# Patient Record
Sex: MALE | Race: WHITE | NOT HISPANIC OR LATINO | Employment: OTHER | URBAN - METROPOLITAN AREA
[De-identification: names, ages, dates, MRNs, and addresses within clinical notes are randomized per-mention and may not be internally consistent; named-entity substitution may affect disease eponyms.]

---

## 2017-02-09 DIAGNOSIS — E78.5 HYPERLIPIDEMIA: ICD-10-CM

## 2017-02-10 RX ORDER — ATORVASTATIN CALCIUM 40 MG/1
TABLET, FILM COATED ORAL
Qty: 90 TABLET | Refills: 0 | Status: SHIPPED | OUTPATIENT
Start: 2017-02-10 | End: 2017-02-13

## 2017-02-13 DIAGNOSIS — E78.5 HYPERLIPIDEMIA: ICD-10-CM

## 2017-02-13 RX ORDER — ATORVASTATIN CALCIUM 40 MG/1
TABLET, FILM COATED ORAL
Qty: 90 TABLET | Refills: 0 | Status: SHIPPED | OUTPATIENT
Start: 2017-02-13 | End: 2017-05-15 | Stop reason: SDUPTHER

## 2017-04-20 DIAGNOSIS — G43.909 MIGRAINE WITHOUT STATUS MIGRAINOSUS, NOT INTRACTABLE, UNSPECIFIED MIGRAINE TYPE: ICD-10-CM

## 2017-04-20 RX ORDER — CEFUROXIME AXETIL 500 MG/1
500 TABLET ORAL 2 TIMES DAILY
Refills: 1 | COMMUNITY
Start: 2017-04-11 | End: 2017-05-03 | Stop reason: ALTCHOICE

## 2017-04-20 RX ORDER — MELOXICAM 15 MG/1
15 TABLET ORAL DAILY
Refills: 1 | COMMUNITY
Start: 2017-02-09 | End: 2017-10-13 | Stop reason: SDUPTHER

## 2017-04-20 NOTE — LETTER
April 25, 2017    Tommy Keith  [address] [place] [address]             [facility]  [address] [facility] [place] [address]  Phone: [phone] Dear Mr. Keith:    This letter is a reminder that your follow up appointment with Dr. Jain is overdue.  Please call our office to schedule an appointment.    If you've already scheduled this appointment, please disregard this notice.    Sincerely,        Barbara Martin LPN

## 2017-04-21 RX ORDER — CEFUROXIME AXETIL 250 MG/1
TABLET ORAL
Qty: 1 ML | Refills: 1 | Status: SHIPPED | OUTPATIENT
Start: 2017-04-21 | End: 2017-05-03 | Stop reason: SDUPTHER

## 2017-04-21 NOTE — TELEPHONE ENCOUNTER
Left message to voicemail [phone] to return call to clinic re: due for follow up appointment with Dr. Jain

## 2017-04-25 NOTE — TELEPHONE ENCOUNTER
Left message to voicemail [phone] to return call to clinic re: due for follow up appointment with Dr. Jain    Letter mailed to patient re: due for follow up appointment with Dr. Jain

## 2017-05-03 ENCOUNTER — OFFICE VISIT (OUTPATIENT)
Dept: FAMILY MEDICINE | Facility: CLINIC | Age: 67
End: 2017-05-03
Payer: COMMERCIAL

## 2017-05-03 VITALS
OXYGEN SATURATION: 98 % | DIASTOLIC BLOOD PRESSURE: 78 MMHG | TEMPERATURE: 98 F | BODY MASS INDEX: 32.01 KG/M2 | HEART RATE: 78 BPM | HEIGHT: 67 IN | SYSTOLIC BLOOD PRESSURE: 100 MMHG | WEIGHT: 203.94 LBS | RESPIRATION RATE: 17 BRPM

## 2017-05-03 DIAGNOSIS — Z12.11 COLON CANCER SCREENING: ICD-10-CM

## 2017-05-03 DIAGNOSIS — G43.909 MIGRAINE WITHOUT STATUS MIGRAINOSUS, NOT INTRACTABLE, UNSPECIFIED MIGRAINE TYPE: ICD-10-CM

## 2017-05-03 DIAGNOSIS — E78.2 MIXED HYPERLIPIDEMIA: Primary | ICD-10-CM

## 2017-05-03 DIAGNOSIS — E78.5 HYPERLIPIDEMIA, UNSPECIFIED HYPERLIPIDEMIA TYPE: ICD-10-CM

## 2017-05-03 PROCEDURE — 99214 OFFICE O/P EST MOD 30 MIN: CPT | Mod: S$GLB,,, | Performed by: INTERNAL MEDICINE

## 2017-05-03 PROCEDURE — 3074F SYST BP LT 130 MM HG: CPT | Mod: S$GLB,,, | Performed by: INTERNAL MEDICINE

## 2017-05-03 PROCEDURE — 1160F RVW MEDS BY RX/DR IN RCRD: CPT | Mod: S$GLB,,, | Performed by: INTERNAL MEDICINE

## 2017-05-03 PROCEDURE — 99999 PR PBB SHADOW E&M-EST. PATIENT-LVL III: CPT | Mod: PBBFAC,,, | Performed by: INTERNAL MEDICINE

## 2017-05-03 PROCEDURE — 3078F DIAST BP <80 MM HG: CPT | Mod: S$GLB,,, | Performed by: INTERNAL MEDICINE

## 2017-05-03 RX ORDER — CEFUROXIME AXETIL 250 MG/1
TABLET ORAL
Qty: 1 ML | Refills: 1 | Status: SHIPPED | OUTPATIENT
Start: 2017-05-21 | End: 2018-01-05 | Stop reason: SDUPTHER

## 2017-05-03 NOTE — PROGRESS NOTES
Subjective:       Patient ID: Tommy Keith is a 66 y.o. male.    Chief Complaint: Hyperlipidemia; Follow-up; Headache; and Medication Refill    HPI Comments: He presents for follow up.  He complains of recurrent migraines over the past few months.  He had not had consistent headaches for over 2 year.  He is still working on his diet and exercise, attempting to lose weight.     Review of Systems   Neurological: Positive for headaches.       Objective:      Physical Exam   Constitutional: He is oriented to person, place, and time. He appears well-developed and well-nourished. No distress.   HENT:   Head: Normocephalic and atraumatic.   Right Ear: External ear normal.   Left Ear: External ear normal.   Mouth/Throat: Oropharynx is clear and moist. No oropharyngeal exudate.   Eyes: EOM are normal. Pupils are equal, round, and reactive to light.   Neurological: He is alert and oriented to person, place, and time.   Skin: Skin is warm and dry. No rash noted.   Psychiatric: He has a normal mood and affect.   Vitals reviewed.      Assessment:       1. Mixed hyperlipidemia    2. Migraine without status migrainosus, not intractable, unspecified migraine type    3. Hyperlipidemia, unspecified hyperlipidemia type    4. Colon cancer screening        Plan:       Tommy was seen today for hyperlipidemia, follow-up, headache and medication refill.    Diagnoses and all orders for this visit:    Mixed hyperlipidemia - Continue Atorvastatin, exercise and low cholesterol diet    Migraine without status migrainosus, not intractable, unspecified migraine type - recurrent over the past 2-3 months.  Will montior  -     sumatriptan (IMITREX STATDOSE) 6 mg/0.5 mL kit; USE AS DIRECTED MAY REPEAT IN 1 HR MAX 2 DOSES PER 24 HOURS    Hyperlipidemia, unspecified hyperlipidemia type  -     Lipid panel; Future  -     Comprehensive metabolic panel; Future    Colon cancer screening  -     Case request GI: COLONOSCOPY       f/u 6 months and  prn

## 2017-05-08 ENCOUNTER — LAB VISIT (OUTPATIENT)
Dept: LAB | Facility: HOSPITAL | Age: 67
End: 2017-05-08
Attending: INTERNAL MEDICINE
Payer: COMMERCIAL

## 2017-05-08 DIAGNOSIS — E78.5 HYPERLIPIDEMIA, UNSPECIFIED HYPERLIPIDEMIA TYPE: ICD-10-CM

## 2017-05-08 LAB
ALBUMIN SERPL BCP-MCNC: 3.8 G/DL
ALP SERPL-CCNC: 58 U/L
ALT SERPL W/O P-5'-P-CCNC: 33 U/L
ANION GAP SERPL CALC-SCNC: 5 MMOL/L
AST SERPL-CCNC: 29 U/L
BILIRUB SERPL-MCNC: 0.9 MG/DL
BUN SERPL-MCNC: 19 MG/DL
CALCIUM SERPL-MCNC: 9.7 MG/DL
CHLORIDE SERPL-SCNC: 106 MMOL/L
CHOLEST/HDLC SERPL: 5.8 {RATIO}
CO2 SERPL-SCNC: 26 MMOL/L
CREAT SERPL-MCNC: 1 MG/DL
EST. GFR  (AFRICAN AMERICAN): >60 ML/MIN/1.73 M^2
EST. GFR  (NON AFRICAN AMERICAN): >60 ML/MIN/1.73 M^2
GLUCOSE SERPL-MCNC: 105 MG/DL
HDL/CHOLESTEROL RATIO: 17.1 %
HDLC SERPL-MCNC: 193 MG/DL
HDLC SERPL-MCNC: 33 MG/DL
LDLC SERPL CALC-MCNC: 143.2 MG/DL
NONHDLC SERPL-MCNC: 160 MG/DL
POTASSIUM SERPL-SCNC: 4.6 MMOL/L
PROT SERPL-MCNC: 7.1 G/DL
SODIUM SERPL-SCNC: 137 MMOL/L
TRIGL SERPL-MCNC: 84 MG/DL

## 2017-05-08 PROCEDURE — 80053 COMPREHEN METABOLIC PANEL: CPT

## 2017-05-08 PROCEDURE — 36415 COLL VENOUS BLD VENIPUNCTURE: CPT | Mod: PO

## 2017-05-08 PROCEDURE — 80061 LIPID PANEL: CPT

## 2017-05-13 DIAGNOSIS — E78.5 HYPERLIPIDEMIA, UNSPECIFIED HYPERLIPIDEMIA TYPE: ICD-10-CM

## 2017-05-15 RX ORDER — ATORVASTATIN CALCIUM 40 MG/1
40 TABLET, FILM COATED ORAL DAILY
Qty: 90 TABLET | Refills: 0 | Status: SHIPPED | OUTPATIENT
Start: 2017-05-15 | End: 2017-05-26 | Stop reason: SDUPTHER

## 2017-05-24 ENCOUNTER — TELEPHONE (OUTPATIENT)
Dept: FAMILY MEDICINE | Facility: CLINIC | Age: 67
End: 2017-05-24

## 2017-05-24 NOTE — TELEPHONE ENCOUNTER
----- Message from Lawanda Ingram sent at 5/24/2017  2:26 PM CDT -----  Contact: Self/[phone]  Patient is requesting an order to schedule a Colonoscopy. Thank you.

## 2017-05-24 NOTE — TELEPHONE ENCOUNTER
Left message to voicemail [phone] to return call to clinic re: COLONOSCOPY orders entered 5-3-17

## 2017-05-26 DIAGNOSIS — E78.5 HYPERLIPIDEMIA, UNSPECIFIED HYPERLIPIDEMIA TYPE: ICD-10-CM

## 2017-05-26 RX ORDER — ATORVASTATIN CALCIUM 40 MG/1
40 TABLET, FILM COATED ORAL DAILY
Qty: 90 TABLET | Refills: 1 | Status: SHIPPED | OUTPATIENT
Start: 2017-05-26 | End: 2017-11-26 | Stop reason: SDUPTHER

## 2017-06-01 NOTE — TELEPHONE ENCOUNTER
----- Message from Cass Nair sent at 6/1/2017  1:20 PM CDT -----  Contact: Self  Pt returning call [phone]

## 2017-10-13 ENCOUNTER — OFFICE VISIT (OUTPATIENT)
Dept: FAMILY MEDICINE | Facility: CLINIC | Age: 67
End: 2017-10-13
Payer: COMMERCIAL

## 2017-10-13 VITALS
SYSTOLIC BLOOD PRESSURE: 132 MMHG | DIASTOLIC BLOOD PRESSURE: 80 MMHG | HEART RATE: 81 BPM | TEMPERATURE: 98 F | BODY MASS INDEX: 32.08 KG/M2 | RESPIRATION RATE: 18 BRPM | WEIGHT: 204.38 LBS | HEIGHT: 67 IN | OXYGEN SATURATION: 96 %

## 2017-10-13 DIAGNOSIS — J31.0 CHRONIC RHINITIS, UNSPECIFIED TYPE: ICD-10-CM

## 2017-10-13 DIAGNOSIS — E86.0 DEHYDRATION: Primary | ICD-10-CM

## 2017-10-13 DIAGNOSIS — M50.30 DEGENERATION OF CERVICAL INTERVERTEBRAL DISC: ICD-10-CM

## 2017-10-13 DIAGNOSIS — Z23 NEED FOR PROPHYLACTIC VACCINATION AND INOCULATION AGAINST INFLUENZA: ICD-10-CM

## 2017-10-13 DIAGNOSIS — M79.602 LEFT ARM PAIN: ICD-10-CM

## 2017-10-13 PROCEDURE — 99999 PR PBB SHADOW E&M-EST. PATIENT-LVL III: CPT | Mod: PBBFAC,,, | Performed by: INTERNAL MEDICINE

## 2017-10-13 PROCEDURE — 99214 OFFICE O/P EST MOD 30 MIN: CPT | Mod: 25,S$GLB,, | Performed by: INTERNAL MEDICINE

## 2017-10-13 PROCEDURE — 90662 IIV NO PRSV INCREASED AG IM: CPT | Mod: S$GLB,,, | Performed by: INTERNAL MEDICINE

## 2017-10-13 PROCEDURE — 90471 IMMUNIZATION ADMIN: CPT | Mod: S$GLB,,, | Performed by: INTERNAL MEDICINE

## 2017-10-13 RX ORDER — FLUTICASONE PROPIONATE 50 MCG
SPRAY, SUSPENSION (ML) NASAL
Qty: 1 BOTTLE | Refills: 2 | Status: SHIPPED | OUTPATIENT
Start: 2017-10-13 | End: 2018-03-01 | Stop reason: SDUPTHER

## 2017-10-13 RX ORDER — MELOXICAM 15 MG/1
15 TABLET ORAL DAILY
Qty: 30 TABLET | Refills: 1 | Status: SHIPPED | OUTPATIENT
Start: 2017-10-13 | End: 2017-12-21 | Stop reason: SDUPTHER

## 2017-10-13 NOTE — PROGRESS NOTES
Subjective:       Patient ID: Tommy Keith is a 67 y.o. male.    Chief Complaint: Dehydration; Fatigue; Generalized Body Aches; ER follow up ([facility] - 10/11/17 ); and Flu Vaccine    He presents for follow-up after a recent ER evaluation for dehydration.  He reports that he became overheated at work 2 days ago.  He generally works outside and does a lot of climbing back and forth during the day.  He was doing his usual work when he felt faint like he would pass out.  He was seen by a physician on the job and completed an EKG and sent him to the emergency room because it was normal.  There he had labs and a repeat EKG which was reportedly okay.  He received IV fluids and was discharged.  He feels better overall but is still somewhat tired and achy.  He has been drinking plenty of water.  He returned back to work today.    He also complains of recent moderate pain in his left arm from his shoulder down to his hand.  This occurs randomly.  There is no associated chest pain or other symptoms.  He denies any recent injury.  He does recall a history of neck pain but not currently.      Review of Systems   Constitutional: Positive for fatigue.   Respiratory: Negative for shortness of breath.    Cardiovascular: Negative for chest pain.   Musculoskeletal: Positive for arthralgias and myalgias.       Objective:      Physical Exam   Constitutional: He is oriented to person, place, and time. He appears well-developed and well-nourished. No distress.   HENT:   Head: Normocephalic and atraumatic.   Right Ear: External ear normal.   Left Ear: External ear normal.   Mouth/Throat: Oropharynx is clear and moist. No oropharyngeal exudate.   Eyes: Conjunctivae are normal. No scleral icterus.   Cardiovascular: Normal rate, regular rhythm and normal heart sounds.  Exam reveals no gallop and no friction rub.    No murmur heard.  Pulmonary/Chest: Effort normal and breath sounds normal. No respiratory distress. He has  no wheezes. He has no rales.   Neurological: He is alert and oriented to person, place, and time. No cranial nerve deficit.   Skin: Skin is warm and dry. No rash noted.   Psychiatric: He has a normal mood and affect.   Vitals reviewed.      Assessment:       1. Dehydration    2. Need for prophylactic vaccination and inoculation against influenza    3. Degeneration of cervical intervertebral disc    4. Left arm pain    5. Chronic rhinitis, unspecified type        Plan:       Tommy was seen today for dehydration, fatigue, generalized body aches, er follow up and flu vaccine.    Diagnoses and all orders for this visit:    Dehydration - he presents for follow-up after recent ER visit at [place] for dehydration after overheating at work with the initial abnormal EKG.  Will obtain his records and reviewed his labs and EKG.    Need for prophylactic vaccination and inoculation against influenza  -     Influenza - High Dose (65+) (PF) (IM)    Degeneration of cervical intervertebral disc    Left arm pain - complains of intermittent left arm pain with no associated symptoms.  Suspect cervical radiculopathy.  Also considered anginal pain.  Will review his EKG as above.  Trial of Catarinoitz and the meantime.  Follow-up if persistent.  -     meloxicam (MOBIC) 15 MG tablet; Take 1 tablet (15 mg total) by mouth once daily.    Chronic rhinitis, unspecified type  -     fluticasone (FLONASE) 50 mcg/actuation nasal spray; INSTILL 2 SPRAYS INTO EACH NOSTRIL DAILY AS NEEDED    Follow-up when necessary

## 2017-10-13 NOTE — PROGRESS NOTES
High dose influenza vaccine was given to the pt as order by the MD. The patient tolerated well, I advised the patient to wait 15 minuets to observe for any vaccine reactions. The pt. Expressed an understanding.

## 2017-10-20 ENCOUNTER — TELEPHONE (OUTPATIENT)
Dept: FAMILY MEDICINE | Facility: CLINIC | Age: 67
End: 2017-10-20

## 2017-10-20 NOTE — TELEPHONE ENCOUNTER
He may be requesting labs results form recent [facility] admission.  We were supposed to request his records.  Please request notes, labs, and imaging.

## 2017-10-20 NOTE — TELEPHONE ENCOUNTER
----- Message from Brenda Murcia sent at 10/20/2017  1:15 PM CDT -----  Contact: self  Patient requesting a call back at [phone] once test results are back.    Thanks!

## 2017-11-26 DIAGNOSIS — E78.5 HYPERLIPIDEMIA, UNSPECIFIED HYPERLIPIDEMIA TYPE: ICD-10-CM

## 2017-11-26 RX ORDER — ATORVASTATIN CALCIUM 40 MG/1
40 TABLET, FILM COATED ORAL DAILY
Qty: 90 TABLET | Refills: 1 | Status: SHIPPED | OUTPATIENT
Start: 2017-11-26 | End: 2018-06-06 | Stop reason: SDUPTHER

## 2017-11-29 NOTE — TELEPHONE ENCOUNTER
Notify the patient that I did review his ER records from [place] from last month.  His labs were all normal.  No need to repeat any labs.

## 2017-12-21 DIAGNOSIS — M79.602 LEFT ARM PAIN: ICD-10-CM

## 2017-12-21 DIAGNOSIS — Z12.11 SCREENING FOR COLON CANCER: Primary | ICD-10-CM

## 2017-12-21 RX ORDER — MELOXICAM 15 MG/1
15 TABLET ORAL DAILY
Qty: 30 TABLET | Refills: 1 | Status: SHIPPED | OUTPATIENT
Start: 2017-12-21 | End: 2018-02-15 | Stop reason: SDUPTHER

## 2017-12-21 NOTE — TELEPHONE ENCOUNTER
----- Message from Gertrude Noyola MA sent at 12/21/2017 11:43 AM CST -----  Contact: [phone]      ----- Message -----  From: Vane Diaz  Sent: 12/21/2017  11:26 AM  To: Ramakrishna ROUSE Staff    Pt is requesting another order for a colonoscopy Please call pt at your earliest convenience.  Thanks !

## 2018-01-05 ENCOUNTER — OFFICE VISIT (OUTPATIENT)
Dept: FAMILY MEDICINE | Facility: CLINIC | Age: 68
End: 2018-01-05
Payer: COMMERCIAL

## 2018-01-05 VITALS
BODY MASS INDEX: 31.73 KG/M2 | HEART RATE: 96 BPM | OXYGEN SATURATION: 97 % | SYSTOLIC BLOOD PRESSURE: 122 MMHG | HEIGHT: 67 IN | WEIGHT: 202.19 LBS | TEMPERATURE: 98 F | DIASTOLIC BLOOD PRESSURE: 82 MMHG

## 2018-01-05 DIAGNOSIS — Z12.11 SCREENING FOR MALIGNANT NEOPLASM OF COLON: Primary | ICD-10-CM

## 2018-01-05 DIAGNOSIS — E78.2 MIXED HYPERLIPIDEMIA: ICD-10-CM

## 2018-01-05 DIAGNOSIS — G43.909 MIGRAINE WITHOUT STATUS MIGRAINOSUS, NOT INTRACTABLE, UNSPECIFIED MIGRAINE TYPE: ICD-10-CM

## 2018-01-05 DIAGNOSIS — Z23 NEED FOR PROPHYLACTIC VACCINATION AGAINST STREPTOCOCCUS PNEUMONIAE (PNEUMOCOCCUS): ICD-10-CM

## 2018-01-05 DIAGNOSIS — Z87.891 HISTORY OF SMOKING: ICD-10-CM

## 2018-01-05 PROCEDURE — 90670 PCV13 VACCINE IM: CPT | Mod: S$GLB,,, | Performed by: FAMILY MEDICINE

## 2018-01-05 PROCEDURE — 90471 IMMUNIZATION ADMIN: CPT | Mod: S$GLB,,, | Performed by: FAMILY MEDICINE

## 2018-01-05 PROCEDURE — 99214 OFFICE O/P EST MOD 30 MIN: CPT | Mod: 25,S$GLB,, | Performed by: FAMILY MEDICINE

## 2018-01-05 PROCEDURE — 99999 PR PBB SHADOW E&M-EST. PATIENT-LVL IV: CPT | Mod: PBBFAC,,, | Performed by: FAMILY MEDICINE

## 2018-01-05 RX ORDER — CEFUROXIME AXETIL 250 MG/1
TABLET ORAL
Qty: 12 ML | Refills: 1 | Status: SHIPPED | OUTPATIENT
Start: 2018-01-05 | End: 2019-06-30 | Stop reason: SDUPTHER

## 2018-01-05 NOTE — PATIENT INSTRUCTIONS

## 2018-01-05 NOTE — PROGRESS NOTES
Routine Office Visit    Patient Name: Tommy Keith    : 1950  MRN: [id number]    Subjective:  Tommy is a 67 y.o. male who presents today for     1. Establish care / new to me / previously seen by Dr. Jain. No issues/complaints. Doing well with current medication regimen.   2. Colonoscopy - pt needs orders for colonoscopy.   3. Migraine - needs imitrex refill. He states migraines are much less frequent that history. He has had 2 episodes in last few months.     Review of Systems   Constitutional: Negative for chills and fever.   HENT: Negative for congestion.    Eyes: Negative for blurred vision.   Respiratory: Negative for cough.    Cardiovascular: Negative for chest pain.   Gastrointestinal: Negative for abdominal pain, constipation, diarrhea, heartburn, nausea and vomiting.   Genitourinary: Negative for dysuria.   Musculoskeletal: Negative for myalgias.   Skin: Negative for itching and rash.   Neurological: Negative for dizziness and headaches.   Psychiatric/Behavioral: Negative for depression.       Active Problem List  Patient Active Problem List   Diagnosis    Sciatica of left side    Hyperlipidemia    History of BPH    History of hepatitis C    Low serum testosterone level    Cervical spondylosis without myelopathy    Degeneration of cervical intervertebral disc    Acquired spondylolisthesis    Displacement of lumbar intervertebral disc without myelopathy    Degeneration of lumbar or lumbosacral intervertebral disc    Thoracic or lumbosacral neuritis or radiculitis, unspecified    Chest pain    Atherosclerosis of aorta       Past Surgical History  Past Surgical History:   Procedure Laterality Date    arthroscopy of both knees      left inguinal hernia repair      LIVER BIOPSY      tonsillectomy      TONSILLECTOMY      UMBILICAL HERNIA REPAIR      VENTRAL HERNIA REPAIR         Family History  Family History   Problem Relation Age of Onset    Heart disease Father     Clotting  "disorder Brother        Social History  Social History     Social History    Marital status:      Spouse name: Deloris    Number of children: 1    Years of education: N/A     Occupational History    works at a chemical plant      Social History Main Topics    Smoking status: Former Smoker     Quit date: 8/16/2006    Smokeless tobacco: Never Used    Alcohol use No    Drug use: No    Sexual activity: Yes     Partners: Female     Other Topics Concern    Not on file     Social History Narrative    He is walking regularly.       Medications and Allergies  Reviewed and updated.   Current Outpatient Prescriptions   Medication Sig    albuterol 90 mcg/actuation inhaler Inhale 2 puffs into the lungs every 6 (six) hours as needed.    aspirin (ECOTRIN) 81 MG EC tablet Take 81 mg by mouth once daily.    atorvastatin (LIPITOR) 40 MG tablet TAKE 1 TABLET (40 MG TOTAL) BY MOUTH ONCE DAILY.    fluticasone (FLONASE) 50 mcg/actuation nasal spray INSTILL 2 SPRAYS INTO EACH NOSTRIL DAILY AS NEEDED    FOLIC ACID/MULTIVIT-MIN/LUTEIN (CENTRUM SILVER ORAL) Take by mouth.    meloxicam (MOBIC) 15 MG tablet Take 1 tablet (15 mg total) by mouth once daily.    sumatriptan (IMITREX STATDOSE) 6 mg/0.5 mL kit USE AS DIRECTED MAY REPEAT IN 1 HR MAX 2 DOSES PER 24 HOURS    testosterone cypionate (DEPOTESTOTERONE CYPIONATE) 200 mg/mL injection Inject 200 mg into the muscle every 14 (fourteen) days.     No current facility-administered medications for this visit.        Physical Exam  /82 (BP Location: Right arm, Patient Position: Sitting, BP Method: Large (Manual))   Pulse 96   Temp 97.9 °F (36.6 °C) (Oral)   Ht 5' 7" (1.702 m)   Wt 91.7 kg (202 lb 2.6 oz)   SpO2 97%   BMI 31.66 kg/m²   Physical Exam   Constitutional: He is oriented to person, place, and time. He appears well-developed and well-nourished.   HENT:   Head: Normocephalic and atraumatic.   Eyes: Conjunctivae and EOM are normal. Pupils are equal, " round, and reactive to light.   Neck: Normal range of motion. Neck supple. No JVD present. No thyromegaly present.   Cardiovascular: Normal rate, regular rhythm and normal heart sounds.    Pulmonary/Chest: Effort normal and breath sounds normal. He has no wheezes.   Abdominal: Soft. Bowel sounds are normal. He exhibits no distension. There is no tenderness. There is no guarding.   Musculoskeletal: Normal range of motion.   Lymphadenopathy:     He has no cervical adenopathy.   Neurological: He is alert and oriented to person, place, and time.   Skin: Skin is warm and dry.   Psychiatric: He has a normal mood and affect. His behavior is normal.         Assessment/Plan:  Tommy Keith is a 67 y.o. male who presents today for :    Screening for malignant neoplasm of colon  -     Case request GI: COLONOSCOPY    Migraine without status migrainosus, not intractable, unspecified migraine type  -     sumatriptan (IMITREX STATDOSE) 6 mg/0.5 mL kit; USE AS DIRECTED MAY REPEAT IN 1 HR MAX 2 DOSES PER 24 HOURS  Dispense: 12 mL; Refill: 1  The current medical regimen is effective;  continue present plan and medications.    Need for prophylactic vaccination against Streptococcus pneumoniae (pneumococcus)  -     Pneumococcal Conjugate Vaccine (13 Valent) (IM)    History of smoking  -     US Abdominal Aorta; Future; Expected date: 01/05/2018    Mixed hyperlipidemia  The current medical regimen is effective;  continue present plan and medications.        Return in about 6 months (around 7/5/2018).

## 2018-01-16 ENCOUNTER — HOSPITAL ENCOUNTER (OUTPATIENT)
Dept: RADIOLOGY | Facility: HOSPITAL | Age: 68
Discharge: HOME | End: 2018-01-16
Attending: FAMILY MEDICINE
Payer: COMMERCIAL

## 2018-01-16 DIAGNOSIS — Z87.891 HISTORY OF SMOKING: ICD-10-CM

## 2018-01-16 PROCEDURE — 76775 US EXAM ABDO BACK WALL LIM: CPT | Mod: 26,,, | Performed by: RADIOLOGY

## 2018-01-16 PROCEDURE — 76775 US EXAM ABDO BACK WALL LIM: CPT | Mod: TC

## 2018-01-22 DIAGNOSIS — Z12.11 COLON CANCER SCREENING: Primary | ICD-10-CM

## 2018-01-25 ENCOUNTER — OFFICE VISIT (OUTPATIENT)
Dept: FAMILY MEDICINE | Facility: CLINIC | Age: 68
End: 2018-01-25
Payer: COMMERCIAL

## 2018-01-25 ENCOUNTER — HOSPITAL ENCOUNTER (OUTPATIENT)
Dept: RADIOLOGY | Facility: HOSPITAL | Age: 68
Discharge: HOME | End: 2018-01-25
Attending: NURSE PRACTITIONER
Payer: COMMERCIAL

## 2018-01-25 VITALS
WEIGHT: 198.88 LBS | OXYGEN SATURATION: 96 % | HEART RATE: 93 BPM | DIASTOLIC BLOOD PRESSURE: 70 MMHG | TEMPERATURE: 98 F | BODY MASS INDEX: 31.21 KG/M2 | HEIGHT: 67 IN | SYSTOLIC BLOOD PRESSURE: 110 MMHG

## 2018-01-25 DIAGNOSIS — R61 NIGHT SWEATS: Primary | ICD-10-CM

## 2018-01-25 DIAGNOSIS — R61 NIGHT SWEATS: ICD-10-CM

## 2018-01-25 PROCEDURE — 71046 X-RAY EXAM CHEST 2 VIEWS: CPT | Mod: 26,,, | Performed by: RADIOLOGY

## 2018-01-25 PROCEDURE — 71046 X-RAY EXAM CHEST 2 VIEWS: CPT | Mod: TC,PO

## 2018-01-25 PROCEDURE — 99999 PR PBB SHADOW E&M-EST. PATIENT-LVL III: CPT | Mod: PBBFAC,,, | Performed by: NURSE PRACTITIONER

## 2018-01-25 PROCEDURE — 99214 OFFICE O/P EST MOD 30 MIN: CPT | Mod: SA,S$GLB,, | Performed by: NURSE PRACTITIONER

## 2018-01-25 NOTE — PROGRESS NOTES
Subjective:       Patient ID: Tommy Keith is a 67 y.o. male.    Chief Complaint: Night Sweats (X 1 week ago ) and Insomnia (X pt states for awhile )    66 yo new to me presents for night sweats. He has no other symptoms. Only new medication is a natural OTC herb that he has been taking. He also reports taking theraflu preventatively as he was around someone else diagnosed with the flu. He has no other complaints at this time.       URI    This is a new (night sweats nightly for the last 2 weeks. Have to change clothes in the middle of the night. ) problem. The current episode started 1 to 4 weeks ago (about 2 weeks ago). The problem has been gradually worsening. There has been no fever. Associated symptoms include rhinorrhea. Pertinent negatives include no chest pain, congestion, coughing, ear pain, nausea, sinus pain, sneezing, sore throat, vomiting or wheezing.       Past Medical History:   Diagnosis Date    Asthma, intermittent     irritant induced    History of BPH     s/p laser TURP    History of hepatitis C     s/p treatment, in remission    History of migraine headaches     History of multiple pulmonary nodules     Hyperlipidemia     Low serum testosterone level     Migraine headache     OA (osteoarthritis)     Obesity     Sciatica of left side     Spinal stenosis        Social History     Social History    Marital status:      Spouse name: Deloris    Number of children: 1    Years of education: N/A     Occupational History    works at a chemical plant      Social History Main Topics    Smoking status: Former Smoker     Quit date: 8/16/2006    Smokeless tobacco: Never Used    Alcohol use No    Drug use: No    Sexual activity: Yes     Partners: Female     Other Topics Concern    Not on file     Social History Narrative    He is walking regularly.       Past Surgical History:   Procedure Laterality Date    arthroscopy of both knees      left inguinal hernia repair       "LIVER BIOPSY      tonsillectomy      TONSILLECTOMY      UMBILICAL HERNIA REPAIR      VENTRAL HERNIA REPAIR         Review of Systems   Constitutional: Negative for chills and fever.   HENT: Positive for rhinorrhea. Negative for congestion, ear pain, sinus pain, sinus pressure, sneezing and sore throat.    Respiratory: Negative for cough, shortness of breath and wheezing.    Cardiovascular: Negative for chest pain, palpitations and leg swelling.   Gastrointestinal: Negative for nausea and vomiting.   Endocrine: Negative for polydipsia, polyphagia and polyuria.   All other systems reviewed and are negative.      Objective:   /70 (BP Location: Left arm, Patient Position: Sitting, BP Method: Large (Manual))   Pulse 93   Temp 98.2 °F (36.8 °C) (Oral)   Ht 5' 7" (1.702 m)   Wt 90.2 kg (198 lb 13.7 oz)   SpO2 96%   BMI 31.15 kg/m²      Physical Exam   Constitutional: He is oriented to person, place, and time. He appears well-developed and well-nourished.   HENT:   Head: Normocephalic and atraumatic.   Right Ear: Hearing, tympanic membrane, external ear and ear canal normal.   Left Ear: Hearing, tympanic membrane, external ear and ear canal normal.   Nose: No rhinorrhea.   Mouth/Throat: No oropharyngeal exudate, posterior oropharyngeal edema or posterior oropharyngeal erythema.   Eyes: Conjunctivae, EOM and lids are normal. Pupils are equal, round, and reactive to light.   Neck: Normal carotid pulses and no JVD present. Carotid bruit is not present. No thyroid mass and no thyromegaly present.   Cardiovascular: Normal rate, regular rhythm, normal heart sounds and normal pulses.    Pulmonary/Chest: Effort normal and breath sounds normal. No respiratory distress. He has no decreased breath sounds. He has no wheezes. He has no rhonchi. He has no rales.   Neurological: He is alert and oriented to person, place, and time.   Skin: Skin is warm, dry and intact. He is not diaphoretic. No pallor.   Psychiatric: He has " a normal mood and affect. His speech is normal and behavior is normal.       Assessment:       1. Night sweats        Plan:       Tommy was seen today for night sweats and insomnia.    Diagnoses and all orders for this visit:    Night sweats  -     X-Ray Chest PA And Lateral; Future  -     Comprehensive metabolic panel; Future  -     CBC auto differential; Future  -     TSH; Future  -     T4, free; Future  -     Hemoglobin A1c; Future    Encouraged to stop natural herb  Will follow up when results available  Follow-up if symptoms worsen or fail to improve.

## 2018-01-26 ENCOUNTER — TELEPHONE (OUTPATIENT)
Dept: FAMILY MEDICINE | Facility: CLINIC | Age: 68
End: 2018-01-26

## 2018-01-26 NOTE — TELEPHONE ENCOUNTER
----- Message from MARIUSZ Shelton sent at 1/26/2018  8:10 AM CST -----  Please inform patient all his labs are normal and his chest xray. Will discuss this with his PCP to see next course of action.

## 2018-01-26 NOTE — TELEPHONE ENCOUNTER
Patient notified.   Patient states he forgot to mention his side hurt when he went for ultrasound.

## 2018-01-29 ENCOUNTER — HOSPITAL ENCOUNTER (EMERGENCY)
Facility: OTHER | Age: 68
End: 2018-01-29
Attending: EMERGENCY MEDICINE
Payer: COMMERCIAL

## 2018-01-29 VITALS
DIASTOLIC BLOOD PRESSURE: 64 MMHG | SYSTOLIC BLOOD PRESSURE: 109 MMHG | TEMPERATURE: 98 F | BODY MASS INDEX: 30.61 KG/M2 | OXYGEN SATURATION: 96 % | RESPIRATION RATE: 18 BRPM | HEART RATE: 87 BPM | HEIGHT: 67 IN | WEIGHT: 195 LBS

## 2018-01-29 DIAGNOSIS — N41.0 ACUTE PROSTATITIS WITH HEMATURIA: ICD-10-CM

## 2018-01-29 DIAGNOSIS — N30.01 ACUTE CYSTITIS WITH HEMATURIA: Primary | ICD-10-CM

## 2018-01-29 DIAGNOSIS — A41.9 SEPSIS, DUE TO UNSPECIFIED ORGANISM: ICD-10-CM

## 2018-01-29 DIAGNOSIS — R00.0 TACHYCARDIA: ICD-10-CM

## 2018-01-29 LAB
ALBUMIN SERPL-MCNC: 4.2 G/DL (ref 3.3–5.5)
ALP SERPL-CCNC: 68 U/L (ref 42–141)
BILIRUB SERPL-MCNC: 1.5 MG/DL (ref 0.2–1.6)
BILIRUBIN, POC UA: NEGATIVE
BLOOD, POC UA: ABNORMAL
BUN SERPL-MCNC: 16 MG/DL (ref 7–22)
CALCIUM SERPL-MCNC: 9.9 MG/DL (ref 8–10.3)
CHLORIDE SERPL-SCNC: 106 MMOL/L (ref 98–108)
CLARITY, POC UA: CLEAR
COLOR, POC UA: YELLOW
CREAT SERPL-MCNC: 1.2 MG/DL (ref 0.6–1.2)
CTP QC/QA: YES
FECAL OCCULT BLOOD, POC: NEGATIVE
GLUCOSE SERPL-MCNC: 100 MG/DL (ref 73–118)
GLUCOSE, POC UA: NEGATIVE
HCO3 UR-SCNC: 27.2 MMOL/L (ref 24–28)
KETONES, POC UA: NEGATIVE
LDH SERPL L TO P-CCNC: 1.57 MMOL/L (ref 0.5–2.2)
LEUKOCYTE EST, POC UA: ABNORMAL
NITRITE, POC UA: NEGATIVE
PCO2 BLDA: 43.3 MMHG (ref 35–45)
PH SMN: 7.41 [PH] (ref 7.35–7.45)
PH UR STRIP: 7 [PH]
PO2 BLDA: 17 MMHG (ref 40–60)
POC ALT (SGPT): 33 U/L (ref 10–47)
POC AST (SGOT): 30 U/L (ref 11–38)
POC B-TYPE NATRIURETIC PEPTIDE: 24.4 PG/ML (ref 0–100)
POC BE: 3 MMOL/L
POC CARDIAC TROPONIN I: 0 NG/ML
POC PTINR: 1.1 (ref 0.9–1.2)
POC PTWBT: 13.3 SEC (ref 9.7–14.3)
POC SATURATED O2: 25 % (ref 95–100)
POC TCO2: 26 MMOL/L (ref 18–33)
POC TCO2: 29 MMOL/L (ref 24–29)
POTASSIUM BLD-SCNC: 4.5 MMOL/L (ref 3.6–5.1)
PROTEIN, POC UA: ABNORMAL
PROTEIN, POC: 7.5 G/DL (ref 6.4–8.1)
SAMPLE: ABNORMAL
SAMPLE: NORMAL
SAMPLE: NORMAL
SODIUM BLD-SCNC: 142 MMOL/L (ref 128–145)
SPECIFIC GRAVITY, POC UA: 1.01
UROBILINOGEN, POC UA: 0.2 E.U./DL

## 2018-01-29 PROCEDURE — 87186 SC STD MICRODIL/AGAR DIL: CPT

## 2018-01-29 PROCEDURE — 96361 HYDRATE IV INFUSION ADD-ON: CPT

## 2018-01-29 PROCEDURE — 87088 URINE BACTERIA CULTURE: CPT

## 2018-01-29 PROCEDURE — 81003 URINALYSIS AUTO W/O SCOPE: CPT

## 2018-01-29 PROCEDURE — 96375 TX/PRO/DX INJ NEW DRUG ADDON: CPT

## 2018-01-29 PROCEDURE — 87086 URINE CULTURE/COLONY COUNT: CPT

## 2018-01-29 PROCEDURE — 87040 BLOOD CULTURE FOR BACTERIA: CPT | Mod: 59

## 2018-01-29 PROCEDURE — 84484 ASSAY OF TROPONIN QUANT: CPT

## 2018-01-29 PROCEDURE — 83880 ASSAY OF NATRIURETIC PEPTIDE: CPT

## 2018-01-29 PROCEDURE — 87077 CULTURE AEROBIC IDENTIFY: CPT

## 2018-01-29 PROCEDURE — 82803 BLOOD GASES ANY COMBINATION: CPT

## 2018-01-29 PROCEDURE — 63600175 PHARM REV CODE 636 W HCPCS: Performed by: EMERGENCY MEDICINE

## 2018-01-29 PROCEDURE — 25500020 PHARM REV CODE 255

## 2018-01-29 PROCEDURE — 25000003 PHARM REV CODE 250: Performed by: EMERGENCY MEDICINE

## 2018-01-29 PROCEDURE — 82270 OCCULT BLOOD FECES: CPT

## 2018-01-29 PROCEDURE — 85610 PROTHROMBIN TIME: CPT

## 2018-01-29 PROCEDURE — 96365 THER/PROPH/DIAG IV INF INIT: CPT

## 2018-01-29 PROCEDURE — 85025 COMPLETE CBC W/AUTO DIFF WBC: CPT

## 2018-01-29 PROCEDURE — 99291 CRITICAL CARE FIRST HOUR: CPT | Mod: 25

## 2018-01-29 PROCEDURE — 80053 COMPREHEN METABOLIC PANEL: CPT

## 2018-01-29 RX ORDER — PHENAZOPYRIDINE HYDROCHLORIDE 100 MG/1
200 TABLET, FILM COATED ORAL
Status: COMPLETED | OUTPATIENT
Start: 2018-01-29 | End: 2018-01-29

## 2018-01-29 RX ORDER — SODIUM CHLORIDE 9 MG/ML
1000 INJECTION, SOLUTION INTRAVENOUS
Status: COMPLETED | OUTPATIENT
Start: 2018-01-29 | End: 2018-01-29

## 2018-01-29 RX ORDER — IBUPROFEN 600 MG/1
600 TABLET ORAL EVERY 6 HOURS PRN
Qty: 20 TABLET | Refills: 0 | Status: SHIPPED | OUTPATIENT
Start: 2018-01-29 | End: 2019-03-25

## 2018-01-29 RX ORDER — ACETAMINOPHEN 10 MG/ML
1000 INJECTION, SOLUTION INTRAVENOUS
Status: COMPLETED | OUTPATIENT
Start: 2018-01-29 | End: 2018-01-29

## 2018-01-29 RX ORDER — CEFTRIAXONE 1 G/1
2 INJECTION, POWDER, FOR SOLUTION INTRAMUSCULAR; INTRAVENOUS
Status: COMPLETED | OUTPATIENT
Start: 2018-01-29 | End: 2018-01-29

## 2018-01-29 RX ORDER — IBUPROFEN 600 MG/1
600 TABLET ORAL
Status: COMPLETED | OUTPATIENT
Start: 2018-01-29 | End: 2018-01-29

## 2018-01-29 RX ORDER — KETOROLAC TROMETHAMINE 30 MG/ML
15 INJECTION, SOLUTION INTRAMUSCULAR; INTRAVENOUS
Status: COMPLETED | OUTPATIENT
Start: 2018-01-29 | End: 2018-01-29

## 2018-01-29 RX ORDER — NITROFURANTOIN 25; 75 MG/1; MG/1
100 CAPSULE ORAL 2 TIMES DAILY
Qty: 10 CAPSULE | Refills: 0 | Status: SHIPPED | OUTPATIENT
Start: 2018-01-29 | End: 2018-01-29 | Stop reason: CLARIF

## 2018-01-29 RX ORDER — PHENAZOPYRIDINE HYDROCHLORIDE 200 MG/1
200 TABLET, FILM COATED ORAL 3 TIMES DAILY
Qty: 6 TABLET | Refills: 0 | Status: SHIPPED | OUTPATIENT
Start: 2018-01-29 | End: 2018-02-08

## 2018-01-29 RX ADMIN — SODIUM CHLORIDE 2655 ML: 0.9 INJECTION, SOLUTION INTRAVENOUS at 09:01

## 2018-01-29 RX ADMIN — IOHEXOL 100 ML: 350 INJECTION, SOLUTION INTRAVENOUS at 10:01

## 2018-01-29 RX ADMIN — CEFTRIAXONE SODIUM 2 G: 1 INJECTION, POWDER, FOR SOLUTION INTRAMUSCULAR; INTRAVENOUS at 09:01

## 2018-01-29 RX ADMIN — IBUPROFEN 600 MG: 600 TABLET, FILM COATED ORAL at 09:01

## 2018-01-29 RX ADMIN — ACETAMINOPHEN 1000 MG: 10 INJECTION, SOLUTION INTRAVENOUS at 11:01

## 2018-01-29 RX ADMIN — KETOROLAC TROMETHAMINE 15 MG: 30 INJECTION, SOLUTION INTRAMUSCULAR at 11:01

## 2018-01-29 RX ADMIN — SODIUM CHLORIDE 1000 ML: 0.9 INJECTION, SOLUTION INTRAVENOUS at 03:01

## 2018-01-29 RX ADMIN — PHENAZOPYRIDINE HYDROCHLORIDE 200 MG: 100 TABLET ORAL at 09:01

## 2018-02-01 LAB — BACTERIA UR CULT: NORMAL

## 2018-02-03 LAB
BACTERIA BLD CULT: NORMAL
BACTERIA BLD CULT: NORMAL

## 2018-02-05 ENCOUNTER — OFFICE VISIT (OUTPATIENT)
Dept: FAMILY MEDICINE | Facility: CLINIC | Age: 68
End: 2018-02-05
Payer: COMMERCIAL

## 2018-02-05 VITALS
WEIGHT: 198.75 LBS | OXYGEN SATURATION: 97 % | TEMPERATURE: 98 F | HEART RATE: 76 BPM | DIASTOLIC BLOOD PRESSURE: 86 MMHG | BODY MASS INDEX: 31.19 KG/M2 | SYSTOLIC BLOOD PRESSURE: 118 MMHG | HEIGHT: 67 IN

## 2018-02-05 DIAGNOSIS — F51.01 PRIMARY INSOMNIA: ICD-10-CM

## 2018-02-05 DIAGNOSIS — N41.3 PROSTATOCYSTITIS: Primary | ICD-10-CM

## 2018-02-05 PROCEDURE — 99999 PR PBB SHADOW E&M-EST. PATIENT-LVL III: CPT | Mod: PBBFAC,,, | Performed by: NURSE PRACTITIONER

## 2018-02-05 PROCEDURE — 99211 OFF/OP EST MAY X REQ PHY/QHP: CPT | Mod: SA,S$GLB,, | Performed by: NURSE PRACTITIONER

## 2018-02-05 RX ORDER — ZOLPIDEM TARTRATE 5 MG/1
5 TABLET ORAL NIGHTLY PRN
Qty: 20 TABLET | Refills: 0 | Status: SHIPPED | OUTPATIENT
Start: 2018-02-05 | End: 2021-04-22

## 2018-02-05 RX ORDER — SULFAMETHOXAZOLE AND TRIMETHOPRIM 800; 160 MG/1; MG/1
TABLET ORAL
COMMUNITY
Start: 2018-02-01 | End: 2021-09-16

## 2018-02-05 NOTE — PROGRESS NOTES
Subjective:       Patient ID: Tommy Keith is a 67 y.o. male.    Chief Complaint: Hospital Follow Up and shoulder blade pain (pt states like a stabbing pain )    68 yo male presents for hospital follow up. He was admitted to [facility] for prostatitis. He was given IV antibiotics and discharged on oral antibiotics. He has a follow up appt tomorrow with his Urologist. He is still taking his oral abx. He reports improved symptoms. He also reports pain in the thoracic back area in-between his shoulder blades when he stretches. He says if no improvement, he will follow up with PCP.        Past Medical History:   Diagnosis Date    Asthma, intermittent     irritant induced    History of BPH     s/p laser TURP    History of hepatitis C     s/p treatment, in remission    History of migraine headaches     History of multiple pulmonary nodules     Hyperlipidemia     Low serum testosterone level     Migraine headache     OA (osteoarthritis)     Obesity     Sciatica of left side     Spinal stenosis        Social History     Social History    Marital status:      Spouse name: Deloris    Number of children: 1    Years of education: N/A     Occupational History    works at a chemical plant      Social History Main Topics    Smoking status: Former Smoker     Quit date: 8/16/2006    Smokeless tobacco: Never Used    Alcohol use No    Drug use: No    Sexual activity: Yes     Partners: Female     Other Topics Concern    Not on file     Social History Narrative    He is walking regularly.       Past Surgical History:   Procedure Laterality Date    arthroscopy of both knees      left inguinal hernia repair      LIVER BIOPSY      tonsillectomy      TONSILLECTOMY      UMBILICAL HERNIA REPAIR      VENTRAL HERNIA REPAIR         Review of Systems   Genitourinary: Negative for decreased urine volume, difficulty urinating, dysuria, flank pain, frequency, hematuria and urgency.       Objective:   BP  "118/86 (BP Location: Left arm, Patient Position: Sitting, BP Method: Large (Manual))   Pulse 76   Temp 97.9 °F (36.6 °C) (Oral)   Ht 5' 7" (1.702 m)   Wt 90.2 kg (198 lb 11.9 oz)   SpO2 97%   BMI 31.13 kg/m²         Physical Exam      Assessment:       1. Prostatocystitis    2. Primary insomnia        Plan:       Tommy was seen today for hospital follow up and shoulder blade pain.    Diagnoses and all orders for this visit:    Prostatocystitis  He will continue on antibiotics    Primary insomnia  -     zolpidem (AMBIEN) 5 MG Tab; Take 1 tablet (5 mg total) by mouth nightly as needed.  Discussed risk associated with medication. He verbalized understanding.     Follow-up if symptoms worsen or fail to improve.    "

## 2018-02-06 ENCOUNTER — TELEPHONE (OUTPATIENT)
Dept: EMERGENCY MEDICINE | Facility: OTHER | Age: 68
End: 2018-02-06

## 2018-02-15 DIAGNOSIS — M79.602 LEFT ARM PAIN: ICD-10-CM

## 2018-02-15 RX ORDER — MELOXICAM 15 MG/1
15 TABLET ORAL DAILY
Qty: 30 TABLET | Refills: 1 | Status: SHIPPED | OUTPATIENT
Start: 2018-02-15 | End: 2018-03-05 | Stop reason: SDUPTHER

## 2018-03-01 DIAGNOSIS — J31.0 CHRONIC RHINITIS, UNSPECIFIED TYPE: ICD-10-CM

## 2018-03-01 RX ORDER — FLUTICASONE PROPIONATE 50 MCG
SPRAY, SUSPENSION (ML) NASAL
Qty: 1 BOTTLE | Refills: 2 | Status: SHIPPED | OUTPATIENT
Start: 2018-03-01 | End: 2018-10-03 | Stop reason: SDUPTHER

## 2018-03-05 DIAGNOSIS — M79.602 LEFT ARM PAIN: ICD-10-CM

## 2018-03-05 RX ORDER — MELOXICAM 15 MG/1
15 TABLET ORAL DAILY
Qty: 30 TABLET | Refills: 1 | Status: SHIPPED | OUTPATIENT
Start: 2018-03-05 | End: 2018-05-04 | Stop reason: SDUPTHER

## 2018-03-05 NOTE — TELEPHONE ENCOUNTER
----- Message from Lawanda Ingram sent at 3/5/2018  2:52 PM CST -----  Contact: Self/[phone]  Refill:  meloxicam (MOBIC) 15 MG tablet    Pharmacy:  [facility]/PHARMACY #[id number] - [place], [place] - [facility]. Thank you.

## 2018-04-20 ENCOUNTER — ANESTHESIA (OUTPATIENT)
Dept: ENDOSCOPY | Facility: HOSPITAL | Age: 68
End: 2018-04-20
Payer: COMMERCIAL

## 2018-04-20 ENCOUNTER — ANESTHESIA EVENT (OUTPATIENT)
Dept: ENDOSCOPY | Facility: HOSPITAL | Age: 68
End: 2018-04-20
Payer: COMMERCIAL

## 2018-04-20 ENCOUNTER — HOSPITAL ENCOUNTER (OUTPATIENT)
Facility: HOSPITAL | Age: 68
Discharge: HOME | End: 2018-04-20
Attending: INTERNAL MEDICINE | Admitting: INTERNAL MEDICINE
Payer: COMMERCIAL

## 2018-04-20 VITALS
TEMPERATURE: 98 F | RESPIRATION RATE: 18 BRPM | BODY MASS INDEX: 29.82 KG/M2 | SYSTOLIC BLOOD PRESSURE: 120 MMHG | HEART RATE: 78 BPM | OXYGEN SATURATION: 97 % | HEIGHT: 67 IN | WEIGHT: 190 LBS | DIASTOLIC BLOOD PRESSURE: 71 MMHG

## 2018-04-20 DIAGNOSIS — Z12.11 SCREEN FOR COLON CANCER: ICD-10-CM

## 2018-04-20 PROCEDURE — 25000003 PHARM REV CODE 250: Performed by: ANESTHESIOLOGY

## 2018-04-20 PROCEDURE — 37000009 HC ANESTHESIA EA ADD 15 MINS: Performed by: INTERNAL MEDICINE

## 2018-04-20 PROCEDURE — D9220A PRA ANESTHESIA: Mod: ANES,,, | Performed by: ANESTHESIOLOGY

## 2018-04-20 PROCEDURE — 27201012 HC FORCEPS, HOT/COLD, DISP: Performed by: INTERNAL MEDICINE

## 2018-04-20 PROCEDURE — 45380 COLONOSCOPY AND BIOPSY: CPT | Performed by: INTERNAL MEDICINE

## 2018-04-20 PROCEDURE — 37000008 HC ANESTHESIA 1ST 15 MINUTES: Performed by: INTERNAL MEDICINE

## 2018-04-20 PROCEDURE — 63600175 PHARM REV CODE 636 W HCPCS: Performed by: REGISTERED NURSE

## 2018-04-20 PROCEDURE — 88305 TISSUE EXAM BY PATHOLOGIST: CPT | Performed by: PATHOLOGY

## 2018-04-20 PROCEDURE — D9220A PRA ANESTHESIA: Mod: CRNA,,, | Performed by: REGISTERED NURSE

## 2018-04-20 PROCEDURE — 88305 TISSUE EXAM BY PATHOLOGIST: CPT | Mod: 26,,, | Performed by: PATHOLOGY

## 2018-04-20 RX ORDER — PROPOFOL 10 MG/ML
VIAL (ML) INTRAVENOUS
Status: DISCONTINUED | OUTPATIENT
Start: 2018-04-20 | End: 2018-04-20

## 2018-04-20 RX ORDER — DEXTROMETHORPHAN/PSEUDOEPHED 2.5-7.5/.8
DROPS ORAL
Status: DISCONTINUED
Start: 2018-04-20 | End: 2018-04-20 | Stop reason: HOSPADM

## 2018-04-20 RX ORDER — LIDOCAINE HYDROCHLORIDE 10 MG/ML
1 INJECTION, SOLUTION EPIDURAL; INFILTRATION; INTRACAUDAL; PERINEURAL ONCE
Status: DISCONTINUED | OUTPATIENT
Start: 2018-04-20 | End: 2018-04-20 | Stop reason: HOSPADM

## 2018-04-20 RX ORDER — LIDOCAINE HCL/PF 100 MG/5ML
SYRINGE (ML) INTRAVENOUS
Status: DISCONTINUED | OUTPATIENT
Start: 2018-04-20 | End: 2018-04-20

## 2018-04-20 RX ORDER — SODIUM CHLORIDE 9 MG/ML
INJECTION, SOLUTION INTRAVENOUS CONTINUOUS
Status: DISCONTINUED | OUTPATIENT
Start: 2018-04-20 | End: 2018-04-20 | Stop reason: HOSPADM

## 2018-04-20 RX ORDER — PROPOFOL 10 MG/ML
VIAL (ML) INTRAVENOUS
Status: DISCONTINUED
Start: 2018-04-20 | End: 2018-04-20 | Stop reason: HOSPADM

## 2018-04-20 RX ORDER — LIDOCAINE HYDROCHLORIDE 20 MG/ML
INJECTION, SOLUTION EPIDURAL; INFILTRATION; INTRACAUDAL; PERINEURAL
Status: DISCONTINUED
Start: 2018-04-20 | End: 2018-04-20 | Stop reason: HOSPADM

## 2018-04-20 RX ADMIN — PROPOFOL 20 MG: 10 INJECTION, EMULSION INTRAVENOUS at 07:04

## 2018-04-20 RX ADMIN — PROPOFOL 30 MG: 10 INJECTION, EMULSION INTRAVENOUS at 07:04

## 2018-04-20 RX ADMIN — SODIUM CHLORIDE: 0.9 INJECTION, SOLUTION INTRAVENOUS at 07:04

## 2018-04-20 RX ADMIN — LIDOCAINE HYDROCHLORIDE 100 MG: 20 INJECTION, SOLUTION INTRAVENOUS at 07:04

## 2018-04-20 RX ADMIN — PROPOFOL 70 MG: 10 INJECTION, EMULSION INTRAVENOUS at 07:04

## 2018-04-20 NOTE — H&P
"Gastroenterology    CC: screening    HPI 67 y.o. male here for screening      Past Medical History:   Diagnosis Date    Asthma, intermittent     irritant induced    History of BPH     s/p laser TURP    History of hepatitis C     s/p treatment, in remission    History of migraine headaches     History of multiple pulmonary nodules     Hyperlipidemia     Low serum testosterone level     Migraine headache     OA (osteoarthritis)     Obesity     Sciatica of left side     Spinal stenosis          Review of Systems  General ROS: negative for chills, fever or weight loss  Cardiovascular ROS: no chest pain or dyspnea on exertion    Physical Examination  BP (!) 140/83 (BP Location: Left arm, Patient Position: Lying)   Pulse 76   Temp 98 °F (36.7 °C) (Oral)   Resp 18   Ht 5' 7" (1.702 m)   Wt 86.2 kg (190 lb)   SpO2 97%   BMI 29.76 kg/m²   General appearance: alert, cooperative, no distress  HENT: Normocephalic, atraumatic, neck symmetrical, no nasal discharge   Eyes: conjunctivae/corneas clear, no icterus, EOM's intact  Lungs: resp non-labored  Heart: regular rate   Abdomen: soft, non-tender; bowel sounds normoactive; no organomegaly  Extremities: extremities symmetric; no clubbing, cyanosis, or edema  Neurologic: Alert and oriented X 3, normal strength, normal coordination and gait    Assessment:     screening    Plan:   colonoscopy      "

## 2018-04-20 NOTE — TRANSFER OF CARE
"Anesthesia Transfer of Care Note    Patient: Tommy Keith    Procedure(s) Performed: Procedure(s) (LRB):  COLONOSCOPY (N/A)    Patient location: GI    Anesthesia Type: general    Transport from OR: Transported from OR on room air with adequate spontaneous ventilation    Post pain: adequate analgesia    Post assessment: no apparent anesthetic complications and tolerated procedure well    Post vital signs: stable    Level of consciousness: responds to stimulation    Nausea/Vomiting: no nausea/vomiting    Complications: none    Transfer of care protocol was followedComments: Placed on 2 LNC in recovery      Last vitals:   Visit Vitals  /80   Pulse 82   Temp 36.6 °C (97.9 °F) (Oral)   Resp 20   Ht 5' 7" (1.702 m)   Wt 86.2 kg (190 lb)   SpO2 (!) 92%   BMI 29.76 kg/m²     "

## 2018-04-20 NOTE — PROVATION PATIENT INSTRUCTIONS
Discharge Summary/Instructions after an Endoscopic Procedure  Patient Name: Tommy Keith  Patient MRN: [id number]  Patient YOB: 1950 Friday, April 20, 2018  Jovon Nunez MD  RESTRICTIONS:  During your procedure today, you received medications for sedation.  These   medications may affect your judgment, balance and coordination.  Therefore,   for 24 hours, you have the following restrictions:   - DO NOT drive a car, operate machinery, make legal/financial decisions,   sign important papers or drink alcohol.    ACTIVITY:  The following day: return to full activity including work, except no heavy   lifting, straining or running for 3 days if polyps were removed.  DIET:  Eat and drink normally unless instructed otherwise.     TREATMENT FOR COMMON SIDE EFFECTS:  - Mild abdominal pain, nausea, belching, bloating or excessive gas:  rest,   eat lightly and use a heating pad.  - Sore Throat: treat with throat lozenges and/or gargle with warm salt   water.  - Because air was used during the procedure, expelling large amounts of air   from your rectum or belching is normal.  - If a bowel prep was taken, you may not have a bowel movement for 1-3 days.    This is normal.  SYMPTOMS TO WATCH FOR AND REPORT TO YOUR PHYSICIAN:  1. Abdominal pain or bloating, other than gas cramps.  2. Chest pain.  3. Back pain.  4. Signs of infection such as: chills or fever occurring within 24 hours   after the procedure.  5. Rectal bleeding, which would show as bright red, maroon, or black stools.   (A tablespoon of blood from the rectum is not serious, especially if   hemorrhoids are present.)  6. Vomiting.  7. Weakness or dizziness.  GO DIRECTLY TO THE NEAREST EMERGENCY ROOM IF YOU HAVE ANY OF THE FOLLOWING:      Difficulty breathing              Chills and/or fever over 101 F   Persistent vomiting and/or vomiting blood   Severe abdominal pain   Severe chest pain   Black, tarry stools   Bleeding- more than one tablespoon   Any  other symptom or condition that you feel may need urgent attention  Your doctor recommends these additional instructions:  If any biopsies were taken, your doctors clinic will contact you in 1 to 2   weeks with any results.  - Discharge patient to home.   - Return to normal activities tomorrow.   - Resume previous diet today.   - Await pathology results.   - Repeat colonoscopy in 5-10 years for surveillance pending pathology.  Will   notify the patient.    - Return to referring physician in 4 weeks.   - The findings and recommendations were discussed with the patient and their   family.  For questions, problems or results please call your physician - Jovon Nunez MD at Work:  [phone].  [facility] can be reached at [phone]     IF A COMPLICATION OR EMERGENCY SITUATION ARISES AND YOU ARE UNABLE TO REACH   YOUR PHYSICIAN - GO DIRECTLY TO THE EMERGENCY ROOM.  MD Jovon Stevenson MD  4/20/2018 7:47:08 AM  This report has been verified and signed electronically.

## 2018-04-23 NOTE — ANESTHESIA PREPROCEDURE EVALUATION
04/23/2018  Tommy Keith is a 67 y.o., male.    Anesthesia Evaluation     I have reviewed the Nursing Notes.      Review of Systems  Anesthesia Hx:  No problems with previous Anesthesia   Social:  Former Smoker    Cardiovascular:   Exercise tolerance: good Denies Pacemaker.  Denies Hypertension.  Denies Valvular problems/Murmurs.  Denies MI.  Denies CAD.    Denies CABG/stent.  Denies Dysrhythmias.   Denies Angina.             denies PVD hyperlipidemia        Pulmonary:   Denies Pneumonia Denies COPD. Asthma asymptomatic  Denies Shortness of breath.  Denies Recent URI.  Denies Sleep Apnea.    Renal/:   BPH    Hepatic/GI:   Hepatitis, C    Musculoskeletal:   Arthritis     Neurological:   Denies CVA. Headaches Denies Seizures.    Endocrine:  Endocrine Normal        Physical Exam  General:  Obesity    Airway/Jaw/Neck:  AIRWAY FINDINGS: Normal           Mental Status:  Mental Status Findings: Normal        Anesthesia Plan  Type of Anesthesia, risks & benefits discussed:  Anesthesia Type:  general  Patient's Preference:   Intra-op Monitoring Plan: standard ASA monitors  Intra-op Monitoring Plan Comments:   Post Op Pain Control Plan:   Post Op Pain Control Plan Comments:   Induction:   IV  Beta Blocker:  Patient is not currently on a Beta-Blocker (No further documentation required).       Informed Consent: Patient understands risks and agrees with Anesthesia plan.  Questions answered. Anesthesia consent signed with patient.  ASA Score: 2     Day of Surgery Review of History & Physical:    H&P update referred to the surgeon.         Ready For Surgery From Anesthesia Perspective.

## 2018-04-23 NOTE — ANESTHESIA POSTPROCEDURE EVALUATION
"Anesthesia Post Evaluation    Patient: Tommy Keith    Procedure(s) Performed: Procedure(s) (LRB):  COLONOSCOPY (N/A)    Final Anesthesia Type: general  Patient location during evaluation: PACU  Patient participation: Yes- Able to Participate  Level of consciousness: awake and alert and oriented  Post-procedure vital signs: reviewed and stable  Pain management: adequate  Airway patency: patent  PONV status at discharge: No PONV  Anesthetic complications: no      Cardiovascular status: blood pressure returned to baseline and hemodynamically stable  Respiratory status: unassisted and spontaneous ventilation  Hydration status: euvolemic  Follow-up not needed.        Visit Vitals  /71 (BP Location: Left arm, Patient Position: Lying)   Pulse 78   Temp 36.6 °C (97.9 °F) (Oral)   Resp 18   Ht 5' 7" (1.702 m)   Wt 86.2 kg (190 lb)   SpO2 97%   BMI 29.76 kg/m²       Pain/Sarina Score: No Data Recorded      "

## 2018-05-04 DIAGNOSIS — M79.602 LEFT ARM PAIN: ICD-10-CM

## 2018-05-04 RX ORDER — MELOXICAM 15 MG/1
15 TABLET ORAL DAILY
Qty: 30 TABLET | Refills: 1 | Status: SHIPPED | OUTPATIENT
Start: 2018-05-04 | End: 2018-10-26 | Stop reason: SDUPTHER

## 2018-05-18 ENCOUNTER — OFFICE VISIT (OUTPATIENT)
Dept: CARDIOLOGY | Facility: CLINIC | Age: 68
End: 2018-05-18
Payer: COMMERCIAL

## 2018-05-18 ENCOUNTER — HOSPITAL ENCOUNTER (OUTPATIENT)
Dept: CARDIOLOGY | Facility: HOSPITAL | Age: 68
Discharge: HOME | End: 2018-05-18
Attending: INTERNAL MEDICINE
Payer: COMMERCIAL

## 2018-05-18 VITALS
HEART RATE: 73 BPM | OXYGEN SATURATION: 93 % | SYSTOLIC BLOOD PRESSURE: 160 MMHG | BODY MASS INDEX: 29.69 KG/M2 | DIASTOLIC BLOOD PRESSURE: 96 MMHG | RESPIRATION RATE: 20 BRPM | WEIGHT: 189.63 LBS

## 2018-05-18 DIAGNOSIS — R07.9 CHEST PAIN, UNSPECIFIED TYPE: ICD-10-CM

## 2018-05-18 DIAGNOSIS — Z86.19 HISTORY OF HEPATITIS C: ICD-10-CM

## 2018-05-18 DIAGNOSIS — I70.0 ATHEROSCLEROSIS OF AORTA: ICD-10-CM

## 2018-05-18 DIAGNOSIS — R07.9 CHEST PAIN, UNSPECIFIED TYPE: Primary | ICD-10-CM

## 2018-05-18 DIAGNOSIS — E78.2 MIXED HYPERLIPIDEMIA: ICD-10-CM

## 2018-05-18 LAB
ESTIMATED PA SYSTOLIC PRESSURE: 24.9
GLOBAL PERICARDIAL EFFUSION: NORMAL
RETIRED EF AND QEF - SEE NOTES: 55 (ref 55–65)
TRICUSPID VALVE REGURGITATION: NORMAL

## 2018-05-18 PROCEDURE — 93306 TTE W/DOPPLER COMPLETE: CPT

## 2018-05-18 PROCEDURE — 3008F BODY MASS INDEX DOCD: CPT | Mod: CPTII,S$GLB,, | Performed by: INTERNAL MEDICINE

## 2018-05-18 PROCEDURE — 3080F DIAST BP >= 90 MM HG: CPT | Mod: CPTII,S$GLB,, | Performed by: INTERNAL MEDICINE

## 2018-05-18 PROCEDURE — 99214 OFFICE O/P EST MOD 30 MIN: CPT | Mod: S$GLB,,, | Performed by: INTERNAL MEDICINE

## 2018-05-18 PROCEDURE — 3077F SYST BP >= 140 MM HG: CPT | Mod: CPTII,S$GLB,, | Performed by: INTERNAL MEDICINE

## 2018-05-18 PROCEDURE — 99999 PR PBB SHADOW E&M-EST. PATIENT-LVL III: CPT | Mod: PBBFAC,,, | Performed by: INTERNAL MEDICINE

## 2018-05-18 PROCEDURE — 93306 TTE W/DOPPLER COMPLETE: CPT | Mod: 26,,, | Performed by: INTERNAL MEDICINE

## 2018-05-18 NOTE — PROGRESS NOTES
Subjective:    Patient ID:  Tommy Keith is a 67 y.o. male who presents for follow-up of Follow-up (abnormal ekg, clearance )      HPI   previous history:  Here for follow-up of recent hospitalization for chest pain.  He underwent diagnostic testing as below.  This essentially within normal limits.  He's had no further chest pain symptoms since the hospital.  He was labile discussed skin dryness was not been taking daily anti-inflammatory's.  He's had no recurrence of any issues.  He describes no other associated symptoms.  He's not express any PND, orthopnea or lower edema.  He denies any dizziness, presyncope or syncope.  He's interesting getting back to exercise.  Otherwise been in his usual state of health.    Today:  Here for follow-up of chest pain.  He was seen a couple years ago and had diagnostic testing which was within normal limits.  He denies any further chest pain symptoms.  He says been doing a lot of heavy activity at work including climbing rigs with many steps.  He is undergoing cystoscopy on Monday.  He is not had any other cardiopulmonary complaints.  He's express no PND, orthopnea or lower edema.  He denies any dizziness, presyncope or syncope.  The wise been in his usual state of health.  He says his daughter is getting  tomorrow night at the [facility].      Review of Systems   Constitution: Negative.   HENT: Negative.    Eyes: Negative.    Cardiovascular: Negative for chest pain, dyspnea on exertion, irregular heartbeat, leg swelling, near-syncope, orthopnea, palpitations, paroxysmal nocturnal dyspnea and syncope.   Respiratory: Negative for shortness of breath.    Skin: Negative.    Musculoskeletal: Negative.    Gastrointestinal: Negative for abdominal pain, constipation and diarrhea.   Genitourinary: Negative for dysuria.   Neurological: Negative for dizziness.   Psychiatric/Behavioral: Negative.         Objective:    Physical Exam   Constitutional: He is oriented to person,  place, and time. He appears well-developed and well-nourished. No distress.   HENT:   Head: Normocephalic and atraumatic.   Eyes: Conjunctivae and EOM are normal. Pupils are equal, round, and reactive to light.   Neck: Normal range of motion. Neck supple. No thyromegaly present.   Cardiovascular: Normal rate, regular rhythm and normal heart sounds.    No murmur heard.  Pulmonary/Chest: Effort normal and breath sounds normal. No respiratory distress. He has no wheezes. He has no rales. He exhibits no tenderness.   Abdominal: Soft. Bowel sounds are normal.   Musculoskeletal: He exhibits no edema.   Neurological: He is alert and oriented to person, place, and time.   Skin: Skin is warm and dry.   Psychiatric: He has a normal mood and affect. His behavior is normal.       NST:  9-16  Impression: NORMAL MYOCARDIAL PERFUSION  1. The perfusion scan is free of evidence for myocardial ischemia or injury.   2. Resting wall motion is physiologic.   3. Resting LV function is normal.   4. The ventricular volumes are normal at rest and stress.   5. The extracardiac distribution of radioactivity is normal.     Echo:  CONCLUSIONS     1 - Normal left ventricular systolic function (EF 55-60%).     2 - Concentric remodeling.       Assessment:       1. Chest pain, unspecified type    2. History of hepatitis C    3. Mixed hyperlipidemia    4. Atherosclerosis of aorta         Plan:       -Check baseline echo and if EF is preserved and we'll clear at low risk for urological procedure  -Continue risk factor modification i.e. Diet and exercise    Return to clinic in 6 months

## 2018-05-18 NOTE — LETTER
May 18, 2018    Tommy Keith  [address] [place] [address]             [facility]  [address]  [place] [address]  Phone: [phone]   Mr. Tommy Keith was seen by me on 5/18/2018 and is cleared for urology procedure with low intermediate risk.    If you have any questions or concerns, please don't hesitate to call.    Sincerely,      Marco Antonio Zuñiga MD

## 2018-06-06 DIAGNOSIS — E78.5 HYPERLIPIDEMIA, UNSPECIFIED HYPERLIPIDEMIA TYPE: ICD-10-CM

## 2018-06-06 RX ORDER — ATORVASTATIN CALCIUM 40 MG/1
40 TABLET, FILM COATED ORAL DAILY
Qty: 90 TABLET | Refills: 1 | Status: SHIPPED | OUTPATIENT
Start: 2018-06-06 | End: 2018-10-16

## 2018-06-06 NOTE — TELEPHONE ENCOUNTER
----- Message from John Sarabia sent at 6/6/2018 10:48 AM CDT -----  Contact: PHARMACY [phone]  REFILL: atorvastatin (LIPITOR) 40 MG tablet (# 90 DAY SUPPLY#)    PHARMACY: [facility]/PHARMACY #[id number] - [place] [address] [address]

## 2018-09-12 ENCOUNTER — OFFICE VISIT (OUTPATIENT)
Dept: CARDIOLOGY | Facility: CLINIC | Age: 68
End: 2018-09-12
Payer: COMMERCIAL

## 2018-09-12 VITALS
OXYGEN SATURATION: 99 % | WEIGHT: 207.25 LBS | RESPIRATION RATE: 20 BRPM | HEART RATE: 72 BPM | SYSTOLIC BLOOD PRESSURE: 122 MMHG | BODY MASS INDEX: 32.46 KG/M2 | DIASTOLIC BLOOD PRESSURE: 90 MMHG

## 2018-09-12 DIAGNOSIS — E78.2 MIXED HYPERLIPIDEMIA: ICD-10-CM

## 2018-09-12 DIAGNOSIS — I70.0 ATHEROSCLEROSIS OF AORTA: ICD-10-CM

## 2018-09-12 DIAGNOSIS — R07.89 CHEST TIGHTNESS: ICD-10-CM

## 2018-09-12 DIAGNOSIS — R07.9 CHEST PAIN, UNSPECIFIED TYPE: Primary | ICD-10-CM

## 2018-09-12 DIAGNOSIS — R07.9 CHRONIC CHEST PAIN: ICD-10-CM

## 2018-09-12 DIAGNOSIS — Z86.19 HISTORY OF HEPATITIS C: ICD-10-CM

## 2018-09-12 DIAGNOSIS — G89.29 CHRONIC CHEST PAIN: ICD-10-CM

## 2018-09-12 PROCEDURE — 3074F SYST BP LT 130 MM HG: CPT | Mod: CPTII,S$GLB,, | Performed by: INTERNAL MEDICINE

## 2018-09-12 PROCEDURE — 99214 OFFICE O/P EST MOD 30 MIN: CPT | Mod: S$GLB,,, | Performed by: INTERNAL MEDICINE

## 2018-09-12 PROCEDURE — 93000 ELECTROCARDIOGRAM COMPLETE: CPT | Mod: S$GLB,,, | Performed by: INTERNAL MEDICINE

## 2018-09-12 PROCEDURE — 1101F PT FALLS ASSESS-DOCD LE1/YR: CPT | Mod: CPTII,S$GLB,, | Performed by: INTERNAL MEDICINE

## 2018-09-12 PROCEDURE — 3080F DIAST BP >= 90 MM HG: CPT | Mod: CPTII,S$GLB,, | Performed by: INTERNAL MEDICINE

## 2018-09-12 PROCEDURE — 99999 PR PBB SHADOW E&M-EST. PATIENT-LVL III: CPT | Mod: PBBFAC,,, | Performed by: INTERNAL MEDICINE

## 2018-09-12 NOTE — PROGRESS NOTES
Subjective:    Patient ID:  Tommy Keith is a 68 y.o. male who presents for follow-up of Chest Pain      HPI   previous history:  Here for follow-up of chest pain.  He was seen a couple years ago and had diagnostic testing which was within normal limits.  He denies any further chest pain symptoms.  He says been doing a lot of heavy activity at work including climbing rigs with many steps.  He is undergoing cystoscopy on Monday.  He is not had any other cardiopulmonary complaints.  He's express no PND, orthopnea or lower edema.  He denies any dizziness, presyncope or syncope.  The wise been in his usual state of health.  He says his daughter is getting  tomorrow night at the [facility].    Today:  Here for follow-up of chest pain.  He again is experiencing significant substernal chest tightness which is somewhat different than his episodes in the past.  It is left-sided and does occur with exertion but can also happen at rest.  He denies any other associated symptoms.  There has been no significant radiation or nausea/vomiting.  He denies any PND, orthopnea or lower extremity edema. He has not experienced any dizziness, presyncope or syncope.  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  He mainly was concerned with a strong family history and wants to get back and exercise program as well.      Review of Systems   Constitution: Negative.   HENT: Negative.    Eyes: Negative.    Cardiovascular: Negative for chest pain, dyspnea on exertion, irregular heartbeat, leg swelling, near-syncope, orthopnea, palpitations, paroxysmal nocturnal dyspnea and syncope.   Respiratory: Negative for shortness of breath.    Skin: Negative.    Musculoskeletal: Negative.    Gastrointestinal: Negative for abdominal pain, constipation and diarrhea.   Genitourinary: Negative for dysuria.   Neurological: Negative for dizziness.   Psychiatric/Behavioral: Negative.         Objective:     Physical Exam   Constitutional: He is oriented to person, place, and time. He appears well-developed and well-nourished. No distress.   HENT:   Head: Normocephalic and atraumatic.   Eyes: Conjunctivae and EOM are normal. Pupils are equal, round, and reactive to light.   Neck: Normal range of motion. Neck supple. No thyromegaly present.   Cardiovascular: Normal rate, regular rhythm and normal heart sounds.   No murmur heard.  Pulmonary/Chest: Effort normal and breath sounds normal. No respiratory distress. He has no wheezes. He has no rales. He exhibits no tenderness.   Abdominal: Soft. Bowel sounds are normal.   Musculoskeletal: He exhibits no edema.   Neurological: He is alert and oriented to person, place, and time.   Skin: Skin is warm and dry.   Psychiatric: He has a normal mood and affect. His behavior is normal.       NST:  9-16  Impression: NORMAL MYOCARDIAL PERFUSION  1. The perfusion scan is free of evidence for myocardial ischemia or injury.   2. Resting wall motion is physiologic.   3. Resting LV function is normal.   4. The ventricular volumes are normal at rest and stress.   5. The extracardiac distribution of radioactivity is normal.     Echo:  5-18  CONCLUSIONS     1 - Normal left ventricular systolic function (EF 55-60%).     2 - Concentric remodeling.     3 - Indeterminate LV diastolic function.       Assessment:       1. Chest pain, unspecified type    2. Chronic chest pain    3. History of hepatitis C    4. Mixed hyperlipidemia    5. Atherosclerosis of aorta         Plan:       -recurrence of symptoms with multiple risk factors, plan for stress nuclear  -Continue risk factor modification i.e. Diet and exercise  -will assess risk with sumatriptan and angina following testing    Return to clinic in 1 month with testing now

## 2018-09-21 DIAGNOSIS — M79.602 LEFT ARM PAIN: ICD-10-CM

## 2018-09-21 RX ORDER — MELOXICAM 15 MG/1
15 TABLET ORAL DAILY
Qty: 30 TABLET | Refills: 1 | Status: SHIPPED | OUTPATIENT
Start: 2018-09-21 | End: 2018-11-08 | Stop reason: SDUPTHER

## 2018-09-26 ENCOUNTER — HOSPITAL ENCOUNTER (OUTPATIENT)
Dept: CARDIOLOGY | Facility: HOSPITAL | Age: 68
Discharge: HOME | End: 2018-09-26
Attending: INTERNAL MEDICINE
Payer: COMMERCIAL

## 2018-09-26 ENCOUNTER — HOSPITAL ENCOUNTER (OUTPATIENT)
Dept: RADIOLOGY | Facility: HOSPITAL | Age: 68
Discharge: HOME | End: 2018-09-26
Attending: INTERNAL MEDICINE
Payer: COMMERCIAL

## 2018-09-26 DIAGNOSIS — G89.29 CHRONIC CHEST PAIN: ICD-10-CM

## 2018-09-26 DIAGNOSIS — R07.9 CHRONIC CHEST PAIN: ICD-10-CM

## 2018-09-26 DIAGNOSIS — R07.9 CHEST PAIN, UNSPECIFIED TYPE: ICD-10-CM

## 2018-09-26 LAB — DIASTOLIC DYSFUNCTION: NO

## 2018-09-26 PROCEDURE — 78452 HT MUSCLE IMAGE SPECT MULT: CPT | Mod: TC

## 2018-09-26 PROCEDURE — 93017 CV STRESS TEST TRACING ONLY: CPT

## 2018-09-26 PROCEDURE — 93018 CV STRESS TEST I&R ONLY: CPT | Mod: ,,, | Performed by: INTERNAL MEDICINE

## 2018-09-26 PROCEDURE — 93016 CV STRESS TEST SUPVJ ONLY: CPT | Mod: ,,, | Performed by: INTERNAL MEDICINE

## 2018-09-26 PROCEDURE — 78452 HT MUSCLE IMAGE SPECT MULT: CPT | Mod: 26,,, | Performed by: INTERNAL MEDICINE

## 2018-10-03 DIAGNOSIS — J31.0 CHRONIC RHINITIS: ICD-10-CM

## 2018-10-03 RX ORDER — FLUTICASONE PROPIONATE 50 MCG
SPRAY, SUSPENSION (ML) NASAL
Qty: 16 ML | Refills: 2 | Status: SHIPPED | OUTPATIENT
Start: 2018-10-03 | End: 2019-05-21 | Stop reason: SDUPTHER

## 2018-10-11 ENCOUNTER — OFFICE VISIT (OUTPATIENT)
Dept: CARDIOLOGY | Facility: CLINIC | Age: 68
End: 2018-10-11
Payer: COMMERCIAL

## 2018-10-11 VITALS
DIASTOLIC BLOOD PRESSURE: 82 MMHG | BODY MASS INDEX: 32.46 KG/M2 | HEART RATE: 80 BPM | RESPIRATION RATE: 20 BRPM | WEIGHT: 207.25 LBS | OXYGEN SATURATION: 97 % | SYSTOLIC BLOOD PRESSURE: 120 MMHG

## 2018-10-11 DIAGNOSIS — I70.0 ATHEROSCLEROSIS OF AORTA: ICD-10-CM

## 2018-10-11 DIAGNOSIS — E78.2 MIXED HYPERLIPIDEMIA: ICD-10-CM

## 2018-10-11 DIAGNOSIS — Z86.19 HISTORY OF HEPATITIS C: ICD-10-CM

## 2018-10-11 DIAGNOSIS — R07.9 CHEST PAIN, UNSPECIFIED TYPE: Primary | ICD-10-CM

## 2018-10-11 PROCEDURE — 3074F SYST BP LT 130 MM HG: CPT | Mod: CPTII,S$GLB,, | Performed by: INTERNAL MEDICINE

## 2018-10-11 PROCEDURE — 99214 OFFICE O/P EST MOD 30 MIN: CPT | Mod: S$GLB,,, | Performed by: INTERNAL MEDICINE

## 2018-10-11 PROCEDURE — 3079F DIAST BP 80-89 MM HG: CPT | Mod: CPTII,S$GLB,, | Performed by: INTERNAL MEDICINE

## 2018-10-11 PROCEDURE — 99999 PR PBB SHADOW E&M-EST. PATIENT-LVL III: CPT | Mod: PBBFAC,,, | Performed by: INTERNAL MEDICINE

## 2018-10-11 PROCEDURE — 1101F PT FALLS ASSESS-DOCD LE1/YR: CPT | Mod: CPTII,S$GLB,, | Performed by: INTERNAL MEDICINE

## 2018-10-15 ENCOUNTER — LAB VISIT (OUTPATIENT)
Dept: LAB | Facility: HOSPITAL | Age: 68
End: 2018-10-15
Attending: INTERNAL MEDICINE
Payer: COMMERCIAL

## 2018-10-15 DIAGNOSIS — E78.2 MIXED HYPERLIPIDEMIA: ICD-10-CM

## 2018-10-15 LAB
ALBUMIN SERPL BCP-MCNC: 4 G/DL
ALP SERPL-CCNC: 54 U/L
ALT SERPL W/O P-5'-P-CCNC: 42 U/L
ANION GAP SERPL CALC-SCNC: 5 MMOL/L
AST SERPL-CCNC: 33 U/L
BILIRUB SERPL-MCNC: 1.4 MG/DL
BUN SERPL-MCNC: 20 MG/DL
CALCIUM SERPL-MCNC: 9.8 MG/DL
CHLORIDE SERPL-SCNC: 108 MMOL/L
CHOLEST SERPL-MCNC: 206 MG/DL
CHOLEST/HDLC SERPL: 6.6 {RATIO}
CO2 SERPL-SCNC: 26 MMOL/L
CREAT SERPL-MCNC: 0.9 MG/DL
EST. GFR  (AFRICAN AMERICAN): >60 ML/MIN/1.73 M^2
EST. GFR  (NON AFRICAN AMERICAN): >60 ML/MIN/1.73 M^2
GLUCOSE SERPL-MCNC: 91 MG/DL
HDLC SERPL-MCNC: 31 MG/DL
HDLC SERPL: 15 %
LDLC SERPL CALC-MCNC: 157.8 MG/DL
NONHDLC SERPL-MCNC: 175 MG/DL
POTASSIUM SERPL-SCNC: 4.3 MMOL/L
PROT SERPL-MCNC: 7.2 G/DL
SODIUM SERPL-SCNC: 139 MMOL/L
TRIGL SERPL-MCNC: 86 MG/DL

## 2018-10-15 PROCEDURE — 36415 COLL VENOUS BLD VENIPUNCTURE: CPT

## 2018-10-15 PROCEDURE — 80061 LIPID PANEL: CPT

## 2018-10-15 PROCEDURE — 80053 COMPREHEN METABOLIC PANEL: CPT

## 2018-10-16 ENCOUNTER — TELEPHONE (OUTPATIENT)
Dept: CARDIOLOGY | Facility: CLINIC | Age: 68
End: 2018-10-16

## 2018-10-16 DIAGNOSIS — E78.5 HYPERLIPIDEMIA, UNSPECIFIED HYPERLIPIDEMIA TYPE: ICD-10-CM

## 2018-10-16 RX ORDER — ATORVASTATIN CALCIUM 80 MG/1
80 TABLET, FILM COATED ORAL NIGHTLY
Qty: 30 TABLET | Refills: 6 | Status: SHIPPED | OUTPATIENT
Start: 2018-10-16 | End: 2019-04-22 | Stop reason: SDUPTHER

## 2018-10-16 NOTE — TELEPHONE ENCOUNTER
----- Message from Marco Antonio Zuñiga MD sent at 10/15/2018  4:34 PM CDT -----  Please call patient and inform of abnormal cholesterol profile.  Significantly still elevated and asked him if he is taking his atorvastatin?

## 2018-10-16 NOTE — TELEPHONE ENCOUNTER
Called pt, no answer, left vm to call me back   ef      ----- Message from Marco Antonio Zuñiga MD sent at 10/15/2018  4:34 PM CDT -----  Please call patient and inform of abnormal cholesterol profile.  Significantly still elevated and asked him if he is taking his atorvastatin?

## 2018-10-16 NOTE — TELEPHONE ENCOUNTER
Done   ef    ----- Message from Marco Antonio Zuñiga MD sent at 10/16/2018 10:32 AM CDT -----  Increase to 80 daily.  Will place order      --Yogesh    ----- Message -----  From: Janey Moon MA  Sent: 10/16/2018   8:36 AM  To: Marco Antonio Zuñiga MD    Pt says he's been taking atorvastatin 40 mg Daily,  plz advise     ef  ----- Message -----  From: Marco Antonio Zuñiga MD  Sent: 10/15/2018   4:34 PM  To: Janey Moon MA    Please call patient and inform of abnormal cholesterol profile.  Significantly still elevated and asked him if he is taking his atorvastatin?

## 2018-10-26 DIAGNOSIS — M79.602 LEFT ARM PAIN: ICD-10-CM

## 2018-10-26 RX ORDER — MELOXICAM 15 MG/1
15 TABLET ORAL DAILY
Qty: 30 TABLET | Refills: 1 | Status: SHIPPED | OUTPATIENT
Start: 2018-10-26 | End: 2018-11-08

## 2018-11-08 DIAGNOSIS — M79.602 LEFT ARM PAIN: ICD-10-CM

## 2018-11-08 RX ORDER — MELOXICAM 15 MG/1
15 TABLET ORAL DAILY
Qty: 30 TABLET | Refills: 0 | Status: SHIPPED | OUTPATIENT
Start: 2018-11-08 | End: 2019-03-23 | Stop reason: SDUPTHER

## 2018-12-11 DIAGNOSIS — E78.5 HYPERLIPIDEMIA, UNSPECIFIED HYPERLIPIDEMIA TYPE: ICD-10-CM

## 2018-12-11 RX ORDER — ATORVASTATIN CALCIUM 40 MG/1
TABLET, FILM COATED ORAL
Qty: 90 TABLET | Refills: 0 | Status: SHIPPED | OUTPATIENT
Start: 2018-12-11 | End: 2021-04-22

## 2018-12-27 ENCOUNTER — TELEPHONE (OUTPATIENT)
Dept: FAMILY MEDICINE | Facility: CLINIC | Age: 68
End: 2018-12-27

## 2018-12-27 NOTE — TELEPHONE ENCOUNTER
----- Message from Petra Jacobs sent at 12/27/2018 11:11 AM CST -----  Contact: Laly HO   Patient need a refill on his medication .       fluticasone (FLONASE) 50 mcg/actuation nasal spray      [facility]/PHARMACY #[id number] - [place] - [address]

## 2019-01-10 DIAGNOSIS — J31.0 CHRONIC RHINITIS: ICD-10-CM

## 2019-01-10 RX ORDER — FLUTICASONE PROPIONATE 50 MCG
SPRAY, SUSPENSION (ML) NASAL
Qty: 16 ML | Refills: 2 | Status: CANCELLED | OUTPATIENT
Start: 2019-01-10

## 2019-03-23 DIAGNOSIS — M79.602 LEFT ARM PAIN: ICD-10-CM

## 2019-03-25 RX ORDER — MELOXICAM 15 MG/1
TABLET ORAL
Qty: 30 TABLET | Refills: 0 | Status: SHIPPED | OUTPATIENT
Start: 2019-03-25 | End: 2019-04-21 | Stop reason: SDUPTHER

## 2019-04-21 DIAGNOSIS — M79.602 LEFT ARM PAIN: ICD-10-CM

## 2019-04-22 DIAGNOSIS — E78.5 HYPERLIPIDEMIA, UNSPECIFIED HYPERLIPIDEMIA TYPE: ICD-10-CM

## 2019-04-22 RX ORDER — ATORVASTATIN CALCIUM 80 MG/1
80 TABLET, FILM COATED ORAL NIGHTLY
Qty: 30 TABLET | Refills: 6 | Status: SHIPPED | OUTPATIENT
Start: 2019-04-22 | End: 2020-03-02 | Stop reason: SDUPTHER

## 2019-04-22 RX ORDER — MELOXICAM 15 MG/1
TABLET ORAL
Qty: 30 TABLET | Refills: 0 | Status: SHIPPED | OUTPATIENT
Start: 2019-04-22 | End: 2023-02-02

## 2019-04-23 DIAGNOSIS — J31.0 CHRONIC RHINITIS: ICD-10-CM

## 2019-04-23 RX ORDER — FLUTICASONE PROPIONATE 50 MCG
SPRAY, SUSPENSION (ML) NASAL
Qty: 16 ML | Refills: 2 | OUTPATIENT
Start: 2019-04-23

## 2019-05-21 DIAGNOSIS — J31.0 CHRONIC RHINITIS: ICD-10-CM

## 2019-05-21 RX ORDER — FLUTICASONE PROPIONATE 50 MCG
SPRAY, SUSPENSION (ML) NASAL
Qty: 16 ML | Refills: 2 | Status: SHIPPED | OUTPATIENT
Start: 2019-05-21 | End: 2020-01-19

## 2019-06-30 DIAGNOSIS — G43.909 MIGRAINE WITHOUT STATUS MIGRAINOSUS, NOT INTRACTABLE, UNSPECIFIED MIGRAINE TYPE: ICD-10-CM

## 2019-07-01 RX ORDER — CEFUROXIME AXETIL 250 MG/1
TABLET ORAL
Qty: 1 ML | Refills: 4 | Status: SHIPPED | OUTPATIENT
Start: 2019-07-01 | End: 2020-07-28 | Stop reason: SDUPTHER

## 2019-07-03 DIAGNOSIS — M79.602 LEFT ARM PAIN: ICD-10-CM

## 2019-07-03 RX ORDER — MELOXICAM 15 MG/1
TABLET ORAL
Qty: 30 TABLET | Refills: 0 | OUTPATIENT
Start: 2019-07-03

## 2019-07-17 DIAGNOSIS — M79.602 LEFT ARM PAIN: ICD-10-CM

## 2019-07-17 RX ORDER — MELOXICAM 15 MG/1
TABLET ORAL
Qty: 30 TABLET | Refills: 0 | OUTPATIENT
Start: 2019-07-17

## 2019-07-24 DIAGNOSIS — M79.602 LEFT ARM PAIN: ICD-10-CM

## 2019-07-24 RX ORDER — MELOXICAM 15 MG/1
TABLET ORAL
Qty: 30 TABLET | Refills: 0 | OUTPATIENT
Start: 2019-07-24

## 2020-01-19 DIAGNOSIS — J31.0 CHRONIC RHINITIS: ICD-10-CM

## 2020-01-19 RX ORDER — FLUTICASONE PROPIONATE 50 MCG
SPRAY, SUSPENSION (ML) NASAL
Qty: 48 ML | Refills: 0 | Status: SHIPPED | OUTPATIENT
Start: 2020-01-19 | End: 2020-04-21 | Stop reason: SDUPTHER

## 2020-01-30 ENCOUNTER — OFFICE VISIT (OUTPATIENT)
Dept: CARDIOLOGY | Facility: CLINIC | Age: 70
End: 2020-01-30
Payer: MEDICARE

## 2020-01-30 ENCOUNTER — LAB VISIT (OUTPATIENT)
Dept: LAB | Facility: HOSPITAL | Age: 70
End: 2020-01-30
Attending: INTERNAL MEDICINE
Payer: MEDICARE

## 2020-01-30 VITALS
HEART RATE: 71 BPM | BODY MASS INDEX: 32.18 KG/M2 | HEIGHT: 67 IN | DIASTOLIC BLOOD PRESSURE: 85 MMHG | OXYGEN SATURATION: 98 % | RESPIRATION RATE: 15 BRPM | WEIGHT: 205 LBS | SYSTOLIC BLOOD PRESSURE: 125 MMHG

## 2020-01-30 DIAGNOSIS — I77.810 AORTIC ROOT DILATATION: Primary | ICD-10-CM

## 2020-01-30 DIAGNOSIS — E78.2 MIXED HYPERLIPIDEMIA: ICD-10-CM

## 2020-01-30 DIAGNOSIS — R94.31 ABNORMAL EKG: ICD-10-CM

## 2020-01-30 DIAGNOSIS — E66.9 NON MORBID OBESITY, UNSPECIFIED OBESITY TYPE: ICD-10-CM

## 2020-01-30 DIAGNOSIS — I70.0 ATHEROSCLEROSIS OF AORTA: ICD-10-CM

## 2020-01-30 LAB
ALBUMIN SERPL BCP-MCNC: 4.4 G/DL (ref 3.5–5.2)
ALP SERPL-CCNC: 75 U/L (ref 55–135)
ALT SERPL W/O P-5'-P-CCNC: 58 U/L (ref 10–44)
AST SERPL-CCNC: 36 U/L (ref 10–40)
BILIRUB DIRECT SERPL-MCNC: 0.4 MG/DL (ref 0.1–0.3)
BILIRUB SERPL-MCNC: 1 MG/DL (ref 0.1–1)
CHOLEST SERPL-MCNC: 197 MG/DL (ref 120–199)
CHOLEST/HDLC SERPL: 5.8 {RATIO} (ref 2–5)
HDLC SERPL-MCNC: 34 MG/DL (ref 40–75)
HDLC SERPL: 17.3 % (ref 20–50)
LDLC SERPL CALC-MCNC: 145 MG/DL (ref 63–159)
NONHDLC SERPL-MCNC: 163 MG/DL
PROT SERPL-MCNC: 7.5 G/DL (ref 6–8.4)
TRIGL SERPL-MCNC: 90 MG/DL (ref 30–150)

## 2020-01-30 PROCEDURE — 99214 OFFICE O/P EST MOD 30 MIN: CPT | Mod: HCNC,S$GLB,, | Performed by: INTERNAL MEDICINE

## 2020-01-30 PROCEDURE — 93000 ELECTROCARDIOGRAM COMPLETE: CPT | Mod: HCNC,S$GLB,, | Performed by: INTERNAL MEDICINE

## 2020-01-30 PROCEDURE — 80061 LIPID PANEL: CPT | Mod: HCNC

## 2020-01-30 PROCEDURE — 3074F PR MOST RECENT SYSTOLIC BLOOD PRESSURE < 130 MM HG: ICD-10-PCS | Mod: HCNC,CPTII,S$GLB, | Performed by: INTERNAL MEDICINE

## 2020-01-30 PROCEDURE — 99999 PR PBB SHADOW E&M-EST. PATIENT-LVL III: CPT | Mod: PBBFAC,HCNC,, | Performed by: INTERNAL MEDICINE

## 2020-01-30 PROCEDURE — 36415 COLL VENOUS BLD VENIPUNCTURE: CPT | Mod: HCNC

## 2020-01-30 PROCEDURE — 93000 EKG 12-LEAD: ICD-10-PCS | Mod: HCNC,S$GLB,, | Performed by: INTERNAL MEDICINE

## 2020-01-30 PROCEDURE — 99499 RISK ADDL DX/OHS AUDIT: ICD-10-PCS | Mod: S$GLB,,, | Performed by: INTERNAL MEDICINE

## 2020-01-30 PROCEDURE — 1126F PR PAIN SEVERITY QUANTIFIED, NO PAIN PRESENT: ICD-10-PCS | Mod: HCNC,S$GLB,, | Performed by: INTERNAL MEDICINE

## 2020-01-30 PROCEDURE — 3074F SYST BP LT 130 MM HG: CPT | Mod: HCNC,CPTII,S$GLB, | Performed by: INTERNAL MEDICINE

## 2020-01-30 PROCEDURE — 99499 UNLISTED E&M SERVICE: CPT | Mod: S$GLB,,, | Performed by: INTERNAL MEDICINE

## 2020-01-30 PROCEDURE — 3079F PR MOST RECENT DIASTOLIC BLOOD PRESSURE 80-89 MM HG: ICD-10-PCS | Mod: HCNC,CPTII,S$GLB, | Performed by: INTERNAL MEDICINE

## 2020-01-30 PROCEDURE — 1159F PR MEDICATION LIST DOCUMENTED IN MEDICAL RECORD: ICD-10-PCS | Mod: HCNC,S$GLB,, | Performed by: INTERNAL MEDICINE

## 2020-01-30 PROCEDURE — 80076 HEPATIC FUNCTION PANEL: CPT | Mod: HCNC

## 2020-01-30 PROCEDURE — 99214 PR OFFICE/OUTPT VISIT, EST, LEVL IV, 30-39 MIN: ICD-10-PCS | Mod: HCNC,S$GLB,, | Performed by: INTERNAL MEDICINE

## 2020-01-30 PROCEDURE — 3079F DIAST BP 80-89 MM HG: CPT | Mod: HCNC,CPTII,S$GLB, | Performed by: INTERNAL MEDICINE

## 2020-01-30 PROCEDURE — 1101F PT FALLS ASSESS-DOCD LE1/YR: CPT | Mod: HCNC,CPTII,S$GLB, | Performed by: INTERNAL MEDICINE

## 2020-01-30 PROCEDURE — 1126F AMNT PAIN NOTED NONE PRSNT: CPT | Mod: HCNC,S$GLB,, | Performed by: INTERNAL MEDICINE

## 2020-01-30 PROCEDURE — 99999 PR PBB SHADOW E&M-EST. PATIENT-LVL III: ICD-10-PCS | Mod: PBBFAC,HCNC,, | Performed by: INTERNAL MEDICINE

## 2020-01-30 PROCEDURE — 1101F PR PT FALLS ASSESS DOC 0-1 FALLS W/OUT INJ PAST YR: ICD-10-PCS | Mod: HCNC,CPTII,S$GLB, | Performed by: INTERNAL MEDICINE

## 2020-01-30 PROCEDURE — 1159F MED LIST DOCD IN RCRD: CPT | Mod: HCNC,S$GLB,, | Performed by: INTERNAL MEDICINE

## 2020-01-30 NOTE — PROGRESS NOTES
CARDIOVASCULAR PROGRESS NOTE    REASON FOR CONSULT:   Tommy Keith is a 69 y.o. male who presents for follow up of HLP, Ao root dil.    PCP: Jason  HISTORY OF PRESENT ILLNESS:   Previously followed by Dr. Zuñiga, last seen October 2018.    The patient comes in for follow-up after a nearly 2 year hiatus.  He reports generally asymptomatic status without angina or dyspnea.  He has had no palpitations, lightheadedness, dizziness, or syncope.  There has been no PND, orthopnea, lower extremity edema.  Denies any melena, hematuria, or claudicant symptoms.    CARDIOVASCULAR HISTORY:   Ao root dil, 3.7 cm (echo 5/2018)    PAST MEDICAL HISTORY:     Past Medical History:   Diagnosis Date    Asthma, intermittent     irritant induced    History of BPH     s/p laser TURP    History of hepatitis C     s/p treatment, in remission    History of migraine headaches     History of multiple pulmonary nodules     Hyperlipidemia     Low serum testosterone level     Migraine headache     OA (osteoarthritis)     Obesity     Sciatica of left side     Spinal stenosis        PAST SURGICAL HISTORY:     Past Surgical History:   Procedure Laterality Date    arthroscopy of both knees      COLONOSCOPY N/A 4/20/2018    Procedure: COLONOSCOPY;  Surgeon: Jovon Nunez MD;  Location: [facility];  Service: Endoscopy;  Laterality: N/A;    left inguinal hernia repair      LIVER BIOPSY      tonsillectomy      TONSILLECTOMY      UMBILICAL HERNIA REPAIR      VENTRAL HERNIA REPAIR         ALLERGIES AND MEDICATION:     Review of patient's allergies indicates:   Allergen Reactions    Levaquin [levofloxacin]         Medication List           Accurate as of January 30, 2020 11:59 AM. If you have any questions, ask your nurse or doctor.               CONTINUE taking these medications    albuterol 90 mcg/actuation inhaler  Commonly known as:  PROVENTIL/VENTOLIN HFA     aspirin 81 MG EC tablet  Commonly known as:  ECOTRIN     * atorvastatin  40 MG tablet  Commonly known as:  LIPITOR  TAKE 1 TABLET BY MOUTH ONCE DAILY     * atorvastatin 80 MG tablet  Commonly known as:  LIPITOR  TAKE 1 TABLET (80 MG TOTAL) BY MOUTH EVERY EVENING.     CENTRUM SILVER ORAL     fluticasone propionate 50 mcg/actuation nasal spray  Commonly known as:  FLONASE  INSTILL 2 SPRAYS INTO EACH NOSTRIL DAILY AS NEEDED     meloxicam 15 MG tablet  Commonly known as:  MOBIC  TAKE 1 TABLET BY MOUTH EVERY DAY     polyethylene glycol 240-22.72-6.72 -5.84 gram Solr  Commonly known as:  COLYTE  Take 4,000 mLs (4 L total) by mouth as needed.     sulfamethoxazole-trimethoprim 800-160mg 800-160 mg Tab  Commonly known as:  BACTRIM DS     sumatriptan 6 mg/0.5 mL kit  Commonly known as:  IMITREX STATDOSE  USE AS DIRECTED MAY REPEAT IN 1 HR MAX 2 DOSES PER 24 HOURS     testosterone cypionate 200 mg/mL injection  Commonly known as:  DEPOTESTOTERONE CYPIONATE     zolpidem 5 MG Tab  Commonly known as:  AMBIEN  Take 1 tablet (5 mg total) by mouth nightly as needed.         * This list has 2 medication(s) that are the same as other medications prescribed for you. Read the directions carefully, and ask your doctor or other care provider to review them with you.                SOCIAL HISTORY:     Social History     Socioeconomic History    Marital status:      Spouse name: Deloris    Number of children: 1    Years of education: Not on file    Highest education level: Not on file   Occupational History    Occupation: works at a chemical plant   Social Needs    Financial resource strain: Not on file    Food insecurity:     Worry: Not on file     Inability: Not on file    Transportation needs:     Medical: Not on file     Non-medical: Not on file   Tobacco Use    Smoking status: Former Smoker     Last attempt to quit: 2006     Years since quittin.4    Smokeless tobacco: Never Used   Substance and Sexual Activity    Alcohol use: No    Drug use: No    Sexual activity: Yes      "Partners: Female   Lifestyle    Physical activity:     Days per week: Not on file     Minutes per session: Not on file    Stress: Not on file   Relationships    Social connections:     Talks on phone: Not on file     Gets together: Not on file     Attends Uats[employer] service: Not on file     Active member of club or organization: Not on file     Attends meetings of clubs or organizations: Not on file     Relationship status: Not on file   Other Topics Concern    Not on file   Social History Narrative    He is walking regularly.       FAMILY HISTORY:     Family History   Problem Relation Age of Onset    Heart disease Father     Clotting disorder Brother        REVIEW OF SYSTEMS:   Review of Systems   Constitutional: Negative for chills, diaphoresis and fever.   HENT: Negative for nosebleeds.    Eyes: Negative for blurred vision, double vision and photophobia.   Respiratory: Negative for hemoptysis, shortness of breath and wheezing.    Cardiovascular: Negative for chest pain, palpitations, orthopnea, claudication, leg swelling and PND.   Gastrointestinal: Negative for abdominal pain, blood in stool, heartburn, melena, nausea and vomiting.   Genitourinary: Negative for flank pain and hematuria.   Musculoskeletal: Negative for falls, myalgias and neck pain.   Skin: Negative for rash.   Neurological: Negative for dizziness, seizures, loss of consciousness, weakness and headaches.   Endo/Heme/Allergies: Negative for polydipsia. Does not bruise/bleed easily.   Psychiatric/Behavioral: Negative for depression and memory loss. The patient is not nervous/anxious.        PHYSICAL EXAM:     Vitals:    01/30/20 1140   BP: 125/85   Pulse: 71   Resp: 15    Body mass index is 32.11 kg/m².  Weight: 93 kg (205 lb 0.4 oz)   Height: 5' 7" (170.2 cm)     Physical Exam   Constitutional: He is oriented to person, place, and time. He appears well-developed and well-nourished. He is cooperative.  Non-toxic appearance. No distress. "   HENT:   Head: Normocephalic and atraumatic.   Eyes: Pupils are equal, round, and reactive to light. Conjunctivae and EOM are normal. No scleral icterus.   Neck: Trachea normal and normal range of motion. Neck supple. Normal carotid pulses and no JVD present. Carotid bruit is not present. No neck rigidity. No tracheal deviation and no edema present. No thyromegaly present.   Cardiovascular: Normal rate, regular rhythm, S1 normal and S2 normal. PMI is not displaced. Exam reveals no gallop and no friction rub.   No murmur heard.  Pulses:       Carotid pulses are 2+ on the right side, and 2+ on the left side.  Pulmonary/Chest: Effort normal and breath sounds normal. No stridor. No respiratory distress. He has no wheezes. He has no rales. He exhibits no tenderness.   Abdominal: Soft. He exhibits no distension. There is no hepatosplenomegaly.   Musculoskeletal: Normal range of motion. He exhibits no edema or tenderness.   Feet:   Right Foot:   Skin Integrity: Negative for ulcer.   Left Foot:   Skin Integrity: Negative for ulcer.   Neurological: He is alert and oriented to person, place, and time. No cranial nerve deficit.   Skin: Skin is warm and dry. No rash noted. No erythema.   Psychiatric: He has a normal mood and affect. His speech is normal and behavior is normal.   Vitals reviewed.      DATA:   EKG: (personally reviewed tracing(s))  1/30/20 SR 71, early repol, inf ST abnl ?isch, new vs 9/26/18    Laboratory:  CBC:  Recent Labs   Lab 01/25/18  1548   WBC 8.27   Hemoglobin 15.6   Hematocrit 46.0   Platelets 223       CHEMISTRIES:  Recent Labs   Lab 05/08/17  0814 01/25/18  1548 10/15/18  1450   Glucose 105 86 91   Sodium 137 136 139   Potassium 4.6 4.5 4.3   BUN, Bld 19 20 20   Creatinine 1.0 0.9 0.9   eGFR if African American >60.0 >60.0 >60   eGFR if non African American >60.0 >60.0 >60   Calcium 9.7 9.1 9.8       CARDIAC BIOMARKERS:        COAGS:        LIPIDS/LFTS:  Recent Labs   Lab 05/08/17  0814  01/25/18  1548 10/15/18  1450   Cholesterol 193  --  206 H   Triglycerides 84  --  86   HDL 33 L  --  31 L   LDL Cholesterol 143.2  --  157.8   Non-HDL Cholesterol 160  --  175   AST 29 30 33   ALT 33 35 42     The 10-year ASCVD risk score (Rosa MARQUEZ Jr., et al., 2013) is: 18.3%    Values used to calculate the score:      Age: 69 years      Sex: Male      Is Non- : No      Diabetic: No      Tobacco smoker: No      Systolic Blood Pressure: 125 mmHg      Is BP treated: No      HDL Cholesterol: 31 mg/dL      Total Cholesterol: 206 mg/dL      Cardiovascular Testing:  Ex MPI 9/26/18  The patient exercised for 11.06 minutes on a Edgardo protocol, corresponding to a functional capacity of 13 estimated METS, achieving a peak heart rate of 155 bpm, which is 101% of the age predicted maximum heart rate. Blood pressure hypertensive (Presenting BP: 148/89 Peak BP: 215/110).   Nuclear Quantitative Functional Analysis:   LVEF: 58 %  Impression: NORMAL MYOCARDIAL PERFUSION  1. The perfusion scan is free of evidence for myocardial ischemia or injury.   2. Resting wall motion is physiologic.   3. Resting LV function is normal.   4. The ventricular volumes are normal at rest and stress.   5. The extracardiac distribution of radioactivity is normal.   6. When compared to the previous study from 09/12/2016, no change noted.    Echo 5/18/18 (Aortic root 3.7cm)    1 - Normal left ventricular systolic function (EF 55-60%).     2 - Concentric remodeling.     3 - Indeterminate LV diastolic function.     Aortic US 1/16/18 (ectasia noted, 2.8cm max diameter)  Atherosclerosis of the abdominal aorta without aneurysmal dilatation.    ASSESSMENT:   # HLP on atorva 80mg  # Ao root dil, 3.7cm (echo 5/2018)  # abnl EKG  # BMI 32    PLAN:   Cont med rx  Check lipids/LFT  Check echo  RTC 1 month      Miguelangel Bowling MD, FACC

## 2020-02-12 ENCOUNTER — HOSPITAL ENCOUNTER (OUTPATIENT)
Dept: CARDIOLOGY | Facility: HOSPITAL | Age: 70
Discharge: HOME | End: 2020-02-12
Attending: INTERNAL MEDICINE
Payer: MEDICARE

## 2020-02-12 LAB
AORTIC ROOT ANNULUS: 3.01 CM
AORTIC VALVE CUSP SEPERATION: 2.6 CM
ASCENDING AORTA: 3.34 CM
AV INDEX (PROSTH): 1.04
AV MEAN GRADIENT: 4 MMHG
AV PEAK GRADIENT: 8 MMHG
AV VALVE AREA: 4.3 CM2
AV VELOCITY RATIO: 0.9
CV ECHO LV RWT: 0.61 CM
DOP CALC AO PEAK VEL: 1.39 M/S
DOP CALC AO VTI: 26.1 CM
DOP CALC LVOT AREA: 4.1 CM2
DOP CALC LVOT DIAMETER: 2.29 CM
DOP CALC LVOT PEAK VEL: 1.25 M/S
DOP CALC LVOT STROKE VOLUME: 112.26 CM3
DOP CALCLVOT PEAK VEL VTI: 27.27 CM
E WAVE DECELERATION TIME: 225.67 MSEC
E/A RATIO: 1.15
E/E' RATIO: 5.83 M/S
ECHO LV POSTERIOR WALL: 1.32 CM (ref 0.6–1.1)
FRACTIONAL SHORTENING: 34 % (ref 28–44)
INTERVENTRICULAR SEPTUM: 1.33 CM (ref 0.6–1.1)
IVRT: 0.1 MSEC
LA MAJOR: 6.41 CM
LA MINOR: 6.5 CM
LA WIDTH: 3.27 CM
LEFT ATRIUM SIZE: 4.29 CM
LEFT ATRIUM VOLUME: 76.97 CM3
LEFT INTERNAL DIMENSION IN SYSTOLE: 2.84 CM (ref 2.1–4)
LEFT VENTRICLE DIASTOLIC VOLUME: 83.9 ML
LEFT VENTRICLE SYSTOLIC VOLUME: 30.71 ML
LEFT VENTRICULAR INTERNAL DIMENSION IN DIASTOLE: 4.32 CM (ref 3.5–6)
LEFT VENTRICULAR MASS: 215.25 G
LV LATERAL E/E' RATIO: 4.38 M/S
LV SEPTAL E/E' RATIO: 8.75 M/S
MV PEAK A VEL: 0.61 M/S
MV PEAK E VEL: 0.7 M/S
PISA TR MAX VEL: 2.85 M/S
PULM VEIN S/D RATIO: 1.46
PV PEAK D VEL: 0.37 M/S
PV PEAK S VEL: 0.54 M/S
PV PEAK VELOCITY: 1.03 CM/S
RA MAJOR: 5.81 CM
RA PRESSURE: 3 MMHG
RA WIDTH: 3.38 CM
RIGHT VENTRICULAR END-DIASTOLIC DIMENSION: 3.74 CM
RV TISSUE DOPPLER FREE WALL SYSTOLIC VELOCITY 1 (APICAL 4 CHAMBER VIEW): 11.27 CM/S
SINUS: 3.19 CM
STJ: 2.76 CM
TDI LATERAL: 0.16 M/S
TDI SEPTAL: 0.08 M/S
TDI: 0.12 M/S
TR MAX PG: 32 MMHG
TRICUSPID ANNULAR PLANE SYSTOLIC EXCURSION: 2.14 CM
TV REST PULMONARY ARTERY PRESSURE: 35 MMHG

## 2020-02-12 PROCEDURE — 93306 ECHO (CUPID ONLY): ICD-10-PCS | Mod: 26,HCNC,, | Performed by: INTERNAL MEDICINE

## 2020-02-12 PROCEDURE — 93306 TTE W/DOPPLER COMPLETE: CPT | Mod: HCNC

## 2020-02-12 PROCEDURE — 93306 TTE W/DOPPLER COMPLETE: CPT | Mod: 26,HCNC,, | Performed by: INTERNAL MEDICINE

## 2020-02-27 ENCOUNTER — OFFICE VISIT (OUTPATIENT)
Dept: CARDIOLOGY | Facility: CLINIC | Age: 70
End: 2020-02-27
Payer: MEDICARE

## 2020-02-27 VITALS
HEART RATE: 73 BPM | OXYGEN SATURATION: 97 % | BODY MASS INDEX: 32.25 KG/M2 | RESPIRATION RATE: 15 BRPM | HEIGHT: 67 IN | SYSTOLIC BLOOD PRESSURE: 128 MMHG | WEIGHT: 205.5 LBS | DIASTOLIC BLOOD PRESSURE: 70 MMHG

## 2020-02-27 DIAGNOSIS — R94.31 ABNORMAL EKG: ICD-10-CM

## 2020-02-27 DIAGNOSIS — I70.0 ATHEROSCLEROSIS OF AORTA: ICD-10-CM

## 2020-02-27 DIAGNOSIS — E78.2 MIXED HYPERLIPIDEMIA: Primary | ICD-10-CM

## 2020-02-27 PROCEDURE — 3074F PR MOST RECENT SYSTOLIC BLOOD PRESSURE < 130 MM HG: ICD-10-PCS | Mod: HCNC,CPTII,S$GLB, | Performed by: INTERNAL MEDICINE

## 2020-02-27 PROCEDURE — 3078F DIAST BP <80 MM HG: CPT | Mod: HCNC,CPTII,S$GLB, | Performed by: INTERNAL MEDICINE

## 2020-02-27 PROCEDURE — 1159F PR MEDICATION LIST DOCUMENTED IN MEDICAL RECORD: ICD-10-PCS | Mod: HCNC,S$GLB,, | Performed by: INTERNAL MEDICINE

## 2020-02-27 PROCEDURE — 99999 PR PBB SHADOW E&M-EST. PATIENT-LVL III: ICD-10-PCS | Mod: PBBFAC,HCNC,, | Performed by: INTERNAL MEDICINE

## 2020-02-27 PROCEDURE — 99213 OFFICE O/P EST LOW 20 MIN: CPT | Mod: HCNC,S$GLB,, | Performed by: INTERNAL MEDICINE

## 2020-02-27 PROCEDURE — 1101F PR PT FALLS ASSESS DOC 0-1 FALLS W/OUT INJ PAST YR: ICD-10-PCS | Mod: HCNC,CPTII,S$GLB, | Performed by: INTERNAL MEDICINE

## 2020-02-27 PROCEDURE — 3078F PR MOST RECENT DIASTOLIC BLOOD PRESSURE < 80 MM HG: ICD-10-PCS | Mod: HCNC,CPTII,S$GLB, | Performed by: INTERNAL MEDICINE

## 2020-02-27 PROCEDURE — 1159F MED LIST DOCD IN RCRD: CPT | Mod: HCNC,S$GLB,, | Performed by: INTERNAL MEDICINE

## 2020-02-27 PROCEDURE — 1101F PT FALLS ASSESS-DOCD LE1/YR: CPT | Mod: HCNC,CPTII,S$GLB, | Performed by: INTERNAL MEDICINE

## 2020-02-27 PROCEDURE — 1126F AMNT PAIN NOTED NONE PRSNT: CPT | Mod: HCNC,S$GLB,, | Performed by: INTERNAL MEDICINE

## 2020-02-27 PROCEDURE — 1126F PR PAIN SEVERITY QUANTIFIED, NO PAIN PRESENT: ICD-10-PCS | Mod: HCNC,S$GLB,, | Performed by: INTERNAL MEDICINE

## 2020-02-27 PROCEDURE — 3074F SYST BP LT 130 MM HG: CPT | Mod: HCNC,CPTII,S$GLB, | Performed by: INTERNAL MEDICINE

## 2020-02-27 PROCEDURE — 99999 PR PBB SHADOW E&M-EST. PATIENT-LVL III: CPT | Mod: PBBFAC,HCNC,, | Performed by: INTERNAL MEDICINE

## 2020-02-27 PROCEDURE — 99213 PR OFFICE/OUTPT VISIT, EST, LEVL III, 20-29 MIN: ICD-10-PCS | Mod: HCNC,S$GLB,, | Performed by: INTERNAL MEDICINE

## 2020-02-27 NOTE — PROGRESS NOTES
CARDIOVASCULAR PROGRESS NOTE    REASON FOR CONSULT:   Tommy Keith is a 69 y.o. male who presents for follow up of HLP, Ao root dil.    PCP: Jason  HISTORY OF PRESENT ILLNESS:   The patient returns for follow-up of his testing.  He denies intercurrent angina or dyspnea.  There has been no palpitations, lightheadedness, dizziness, or syncope.  He denies PND, orthopnea, lower extremity edema.  He has had no melena, hematuria, or claudicant symptoms.  He tells me he has been compliant with his atorvastatin 80 mg nightly.    I reviewed the results of echocardiogram noting normal LV function.  There is no evidence of aortic root dilatation on this exam.  His LDL is still elevated on the high-dose atorvastatin I suggested that we continue this medication long-term.  I do not feel he requires Zetia or a PCSK9 inhibitor.    CARDIOVASCULAR HISTORY:   Ao root dil, 3.7 cm (echo 5/2018, not dilated on report 2/2020)    PAST MEDICAL HISTORY:     Past Medical History:   Diagnosis Date    Asthma, intermittent     irritant induced    History of BPH     s/p laser TURP    History of hepatitis C     s/p treatment, in remission    History of migraine headaches     History of multiple pulmonary nodules     Hyperlipidemia     Low serum testosterone level     Migraine headache     OA (osteoarthritis)     Obesity     Sciatica of left side     Spinal stenosis        PAST SURGICAL HISTORY:     Past Surgical History:   Procedure Laterality Date    arthroscopy of both knees      COLONOSCOPY N/A 4/20/2018    Procedure: COLONOSCOPY;  Surgeon: Jovon Nunez MD;  Location: [facility];  Service: Endoscopy;  Laterality: N/A;    left inguinal hernia repair      LIVER BIOPSY      tonsillectomy      TONSILLECTOMY      UMBILICAL HERNIA REPAIR      VENTRAL HERNIA REPAIR         ALLERGIES AND MEDICATION:     Review of patient's allergies indicates:   Allergen Reactions    Levaquin [levofloxacin]         Medication List            Accurate as of February 27, 2020  1:06 PM. If you have any questions, ask your nurse or doctor.               CONTINUE taking these medications    albuterol 90 mcg/actuation inhaler  Commonly known as:  PROVENTIL/VENTOLIN HFA     aspirin 81 MG EC tablet  Commonly known as:  ECOTRIN     * atorvastatin 40 MG tablet  Commonly known as:  LIPITOR  TAKE 1 TABLET BY MOUTH ONCE DAILY     * atorvastatin 80 MG tablet  Commonly known as:  LIPITOR  TAKE 1 TABLET (80 MG TOTAL) BY MOUTH EVERY EVENING.     CENTRUM SILVER ORAL     fluticasone propionate 50 mcg/actuation nasal spray  Commonly known as:  FLONASE  INSTILL 2 SPRAYS INTO EACH NOSTRIL DAILY AS NEEDED     meloxicam 15 MG tablet  Commonly known as:  MOBIC  TAKE 1 TABLET BY MOUTH EVERY DAY     polyethylene glycol 240-22.72-6.72 -5.84 gram Solr  Commonly known as:  COLYTE  Take 4,000 mLs (4 L total) by mouth as needed.     sulfamethoxazole-trimethoprim 800-160mg 800-160 mg Tab  Commonly known as:  BACTRIM DS     sumatriptan 6 mg/0.5 mL kit  Commonly known as:  IMITREX STATDOSE  USE AS DIRECTED MAY REPEAT IN 1 HR MAX 2 DOSES PER 24 HOURS     testosterone cypionate 200 mg/mL injection  Commonly known as:  DEPOTESTOTERONE CYPIONATE     zolpidem 5 MG Tab  Commonly known as:  AMBIEN  Take 1 tablet (5 mg total) by mouth nightly as needed.         * This list has 2 medication(s) that are the same as other medications prescribed for you. Read the directions carefully, and ask your doctor or other care provider to review them with you.                SOCIAL HISTORY:     Social History     Socioeconomic History    Marital status:      Spouse name: Deloris    Number of children: 1    Years of education: Not on file    Highest education level: Not on file   Occupational History    Occupation: works at a chemical plant   Social Needs    Financial resource strain: Not on file    Food insecurity:     Worry: Not on file     Inability: Not on file    Transportation needs:      Medical: Not on file     Non-medical: Not on file   Tobacco Use    Smoking status: Former Smoker     Last attempt to quit: 2006     Years since quittin.5    Smokeless tobacco: Never Used   Substance and Sexual Activity    Alcohol use: No    Drug use: No    Sexual activity: Yes     Partners: Female   Lifestyle    Physical activity:     Days per week: Not on file     Minutes per session: Not on file    Stress: Not on file   Relationships    Social connections:     Talks on phone: Not on file     Gets together: Not on file     Attends Gnosticist service: Not on file     Active member of club or organization: Not on file     Attends meetings of clubs or organizations: Not on file     Relationship status: Not on file   Other Topics Concern    Not on file   Social History Narrative    He is walking regularly.       FAMILY HISTORY:     Family History   Problem Relation Age of Onset    Heart disease Father     Clotting disorder Brother        REVIEW OF SYSTEMS:   Review of Systems   Constitutional: Negative for chills, diaphoresis and fever.   HENT: Negative for nosebleeds.    Eyes: Negative for blurred vision, double vision and photophobia.   Respiratory: Negative for hemoptysis, shortness of breath and wheezing.    Cardiovascular: Negative for chest pain, palpitations, orthopnea, claudication, leg swelling and PND.   Gastrointestinal: Negative for abdominal pain, blood in stool, heartburn, melena, nausea and vomiting.   Genitourinary: Negative for flank pain and hematuria.   Musculoskeletal: Negative for falls, myalgias and neck pain.   Skin: Negative for rash.   Neurological: Negative for dizziness, seizures, loss of consciousness, weakness and headaches.   Endo/Heme/Allergies: Negative for polydipsia. Does not bruise/bleed easily.   Psychiatric/Behavioral: Negative for depression and memory loss. The patient is not nervous/anxious.        PHYSICAL EXAM:     Vitals:    20 1259   BP: 128/70  "  Pulse: 73   Resp: 15    Body mass index is 32.18 kg/m².  Weight: 93.2 kg (205 lb 7.5 oz)   Height: 5' 7" (170.2 cm)     Physical Exam   Constitutional: He is oriented to person, place, and time. He appears well-developed and well-nourished. He is cooperative.  Non-toxic appearance. No distress.   HENT:   Head: Normocephalic and atraumatic.   Eyes: Pupils are equal, round, and reactive to light. Conjunctivae and EOM are normal. No scleral icterus.   Neck: Trachea normal and normal range of motion. Neck supple. Normal carotid pulses and no JVD present. Carotid bruit is not present. No neck rigidity. No tracheal deviation and no edema present. No thyromegaly present.   Cardiovascular: Normal rate, regular rhythm, S1 normal and S2 normal. PMI is not displaced. Exam reveals no gallop and no friction rub.   No murmur heard.  Pulses:       Carotid pulses are 2+ on the right side, and 2+ on the left side.  Pulmonary/Chest: Effort normal and breath sounds normal. No stridor. No respiratory distress. He has no wheezes. He has no rales. He exhibits no tenderness.   Abdominal: Soft. He exhibits no distension. There is no hepatosplenomegaly.   Musculoskeletal: Normal range of motion. He exhibits no edema or tenderness.   Feet:   Right Foot:   Skin Integrity: Negative for ulcer.   Left Foot:   Skin Integrity: Negative for ulcer.   Neurological: He is alert and oriented to person, place, and time. No cranial nerve deficit.   Skin: Skin is warm and dry. No rash noted. No erythema.   Psychiatric: He has a normal mood and affect. His speech is normal and behavior is normal.   Vitals reviewed.      DATA:   EKG: (personally reviewed tracing(s))  1/30/20 SR 71, early repol, inf ST abnl ?isch, new vs 9/26/18    Laboratory:  CBC:  Recent Labs   Lab 01/25/18  1548   WBC 8.27   Hemoglobin 15.6   Hematocrit 46.0   Platelets 223       CHEMISTRIES:  Recent Labs   Lab 05/08/17  0814 01/25/18  1548 10/15/18  1450   Glucose 105 86 91   Sodium " 137 136 139   Potassium 4.6 4.5 4.3   BUN, Bld 19 20 20   Creatinine 1.0 0.9 0.9   eGFR if African American >60.0 >60.0 >60   eGFR if non African American >60.0 >60.0 >60   Calcium 9.7 9.1 9.8       CARDIAC BIOMARKERS:        COAGS:        LIPIDS/LFTS:  Recent Labs   Lab 05/08/17  0814 01/25/18  1548 10/15/18  1450 01/30/20  1224   Cholesterol 193  --  206 H 197   Triglycerides 84  --  86 90   HDL 33 L  --  31 L 34 L   LDL Cholesterol 143.2  --  157.8 145.0   Non-HDL Cholesterol 160  --  175 163   AST 29 30 33 36   ALT 33 35 42 58 H     The 10-year ASCVD risk score (Rosa MARQUEZ Jr., et al., 2013) is: 17.7%    Values used to calculate the score:      Age: 69 years      Sex: Male      Is Non- : No      Diabetic: No      Tobacco smoker: No      Systolic Blood Pressure: 128 mmHg      Is BP treated: No      HDL Cholesterol: 34 mg/dL      Total Cholesterol: 197 mg/dL      Cardiovascular Testing:  Echo 2/12/20 (aortic root normal in this exam, was 3.7cm on report 5/2018)  · Normal left ventricular systolic function. The estimated ejection fraction is 60%.  · Mild concentric left ventricular hypertrophy.  · Grade I (mild) left ventricular diastolic dysfunction consistent with impaired relaxation.  · Normal right ventricular systolic function.  · Moderate left atrial enlargement.  · Mild tricuspid regurgitation.  · Normal central venous pressure (3 mmHg).  · The estimated PA systolic pressure is 35 mmHg.    Ex MPI 9/26/18  The patient exercised for 11.06 minutes on a Edgardo protocol, corresponding to a functional capacity of 13 estimated METS, achieving a peak heart rate of 155 bpm, which is 101% of the age predicted maximum heart rate. Blood pressure hypertensive (Presenting BP: 148/89 Peak BP: 215/110).   Nuclear Quantitative Functional Analysis:   LVEF: 58 %  Impression: NORMAL MYOCARDIAL PERFUSION  1. The perfusion scan is free of evidence for myocardial ischemia or injury.   2. Resting wall motion is  physiologic.   3. Resting LV function is normal.   4. The ventricular volumes are normal at rest and stress.   5. The extracardiac distribution of radioactivity is normal.   6. When compared to the previous study from 09/12/2016, no change noted.    Aortic US 1/16/18 (ectasia noted, 2.8cm max diameter)  Atherosclerosis of the abdominal aorta without aneurysmal dilatation.    ASSESSMENT:   # HLP on atorva 80mg  # Ao root dil, 3.7cm (echo 5/2018, not dilated on report 2/2020)  # abnl EKG  # BMI 32, stable vs last OV    PLAN:   Cont med rx  Diet/exercise/weight loss  RTC 1 year      Miguelangel Bowling MD, FACC

## 2020-03-02 DIAGNOSIS — E78.5 HYPERLIPIDEMIA, UNSPECIFIED HYPERLIPIDEMIA TYPE: ICD-10-CM

## 2020-03-02 RX ORDER — ATORVASTATIN CALCIUM 80 MG/1
80 TABLET, FILM COATED ORAL NIGHTLY
Qty: 90 TABLET | Refills: 3 | Status: SHIPPED | OUTPATIENT
Start: 2020-03-02 | End: 2021-03-02

## 2020-04-21 DIAGNOSIS — J31.0 CHRONIC RHINITIS: ICD-10-CM

## 2020-04-21 RX ORDER — FLUTICASONE PROPIONATE 50 MCG
SPRAY, SUSPENSION (ML) NASAL
Qty: 48 ML | Refills: 0 | Status: SHIPPED | OUTPATIENT
Start: 2020-04-21 | End: 2020-07-22 | Stop reason: SDUPTHER

## 2020-04-21 NOTE — TELEPHONE ENCOUNTER
Last Office Visit Info:   The patient's last visit with Angelica Gomez MD was on 1/5/2018.    The patient's last visit in current department was on Visit date not found.        Last CBC Results:   Lab Results   Component Value Date    WBC 8.27 01/25/2018    HGB 15.6 01/25/2018    HCT 46.0 01/25/2018     01/25/2018       Last CMP Results  Lab Results   Component Value Date     10/15/2018    K 4.3 10/15/2018     10/15/2018    CO2 26 10/15/2018    BUN 20 10/15/2018    CREATININE 0.9 10/15/2018    CALCIUM 9.8 10/15/2018    ALBUMIN 4.4 01/30/2020    AST 36 01/30/2020    ALT 58 (H) 01/30/2020       Last Lipids  Lab Results   Component Value Date    CHOL 197 01/30/2020    TRIG 90 01/30/2020    HDL 34 (L) 01/30/2020    LDLCALC 145.0 01/30/2020       Last A1C  Lab Results   Component Value Date    HGBA1C 5.3 01/25/2018       Last TSH  Lab Results   Component Value Date    TSH 1.020 01/25/2018         Current Med Refills  Medication List with Changes/Refills   Current Medications    ALBUTEROL 90 MCG/ACTUATION INHALER    Inhale 2 puffs into the lungs every 6 (six) hours as needed.       Start Date: 5/31/2014 End Date: 2/5/2018    ASPIRIN (ECOTRIN) 81 MG EC TABLET    Take 81 mg by mouth once daily.       Start Date: --        End Date: --    ATORVASTATIN (LIPITOR) 40 MG TABLET    TAKE 1 TABLET BY MOUTH ONCE DAILY       Start Date: 12/11/2018End Date: --    ATORVASTATIN (LIPITOR) 80 MG TABLET    Take 1 tablet (80 mg total) by mouth every evening.       Start Date: 3/2/2020  End Date: --    FLUTICASONE PROPIONATE (FLONASE) 50 MCG/ACTUATION NASAL SPRAY    INSTILL 2 SPRAYS INTO EACH NOSTRIL DAILY AS NEEDED       Start Date: 1/19/2020 End Date: --    FOLIC ACID/MULTIVIT-MIN/LUTEIN (CENTRUM SILVER ORAL)    Take by mouth.       Start Date: --        End Date: --    MELOXICAM (MOBIC) 15 MG TABLET    TAKE 1 TABLET BY MOUTH EVERY DAY       Start Date: 4/22/2019 End Date: --    POLYETHYLENE GLYCOL (COLYTE) 240-22.72-6.72  -5.84 GRAM SOLR    Take 4,000 mLs (4 L total) by mouth as needed.       Start Date: 4/2/2018  End Date: --    SULFAMETHOXAZOLE-TRIMETHOPRIM 800-160MG (BACTRIM DS) 800-160 MG TAB           Start Date: 2/1/2018  End Date: --    SUMATRIPTAN (IMITREX STATDOSE) 6 MG/0.5 ML KIT    USE AS DIRECTED MAY REPEAT IN 1 HR MAX 2 DOSES PER 24 HOURS       Start Date: 7/1/2019  End Date: --    TESTOSTERONE CYPIONATE (DEPOTESTOTERONE CYPIONATE) 200 MG/ML INJECTION    Inject 200 mg into the muscle every 14 (fourteen) days.       Start Date: 1/9/2015  End Date: --    ZOLPIDEM (AMBIEN) 5 MG TAB    Take 1 tablet (5 mg total) by mouth nightly as needed.       Start Date: 2/5/2018  End Date: 8/6/2018       Order(s) placed per written order guidelines:     Please advise.

## 2020-07-21 DIAGNOSIS — J31.0 CHRONIC RHINITIS: ICD-10-CM

## 2020-07-21 RX ORDER — FLUTICASONE PROPIONATE 50 MCG
SPRAY, SUSPENSION (ML) NASAL
Qty: 48 ML | Refills: 0 | OUTPATIENT
Start: 2020-07-21

## 2020-07-21 NOTE — TELEPHONE ENCOUNTER
Last Office Visit Info:   The patient's last visit with Angelica Gomez MD was on 1/5/2018.    The patient's last visit in current department was on Visit date not found.        Last CBC Results:   Lab Results   Component Value Date    WBC 8.27 01/25/2018    HGB 15.6 01/25/2018    HCT 46.0 01/25/2018     01/25/2018       Last CMP Results  Lab Results   Component Value Date     10/15/2018    K 4.3 10/15/2018     10/15/2018    CO2 26 10/15/2018    BUN 20 10/15/2018    CREATININE 0.9 10/15/2018    CALCIUM 9.8 10/15/2018    ALBUMIN 4.4 01/30/2020    AST 36 01/30/2020    ALT 58 (H) 01/30/2020       Last Lipids  Lab Results   Component Value Date    CHOL 197 01/30/2020    TRIG 90 01/30/2020    HDL 34 (L) 01/30/2020    LDLCALC 145.0 01/30/2020       Last A1C  Lab Results   Component Value Date    HGBA1C 5.3 01/25/2018       Last TSH  Lab Results   Component Value Date    TSH 1.020 01/25/2018         Current Med Refills  Medication List with Changes/Refills   Current Medications    ALBUTEROL 90 MCG/ACTUATION INHALER    Inhale 2 puffs into the lungs every 6 (six) hours as needed.       Start Date: 5/31/2014 End Date: 2/5/2018    ASPIRIN (ECOTRIN) 81 MG EC TABLET    Take 81 mg by mouth once daily.       Start Date: --        End Date: --    ATORVASTATIN (LIPITOR) 40 MG TABLET    TAKE 1 TABLET BY MOUTH ONCE DAILY       Start Date: 12/11/2018End Date: --    ATORVASTATIN (LIPITOR) 80 MG TABLET    Take 1 tablet (80 mg total) by mouth every evening.       Start Date: 3/2/2020  End Date: --    FLUTICASONE PROPIONATE (FLONASE) 50 MCG/ACTUATION NASAL SPRAY    INSTILL 2 SPRAYS INTO EACH NOSTRIL DAILY AS NEEDED       Start Date: 4/21/2020 End Date: --    FOLIC ACID/MULTIVIT-MIN/LUTEIN (CENTRUM SILVER ORAL)    Take by mouth.       Start Date: --        End Date: --    MELOXICAM (MOBIC) 15 MG TABLET    TAKE 1 TABLET BY MOUTH EVERY DAY       Start Date: 4/22/2019 End Date: --    POLYETHYLENE GLYCOL (COLYTE) 240-22.72-6.72  -5.84 GRAM SOLR    Take 4,000 mLs (4 L total) by mouth as needed.       Start Date: 4/2/2018  End Date: --    SULFAMETHOXAZOLE-TRIMETHOPRIM 800-160MG (BACTRIM DS) 800-160 MG TAB           Start Date: 2/1/2018  End Date: --    SUMATRIPTAN (IMITREX STATDOSE) 6 MG/0.5 ML KIT    USE AS DIRECTED MAY REPEAT IN 1 HR MAX 2 DOSES PER 24 HOURS       Start Date: 7/1/2019  End Date: --    TESTOSTERONE CYPIONATE (DEPOTESTOTERONE CYPIONATE) 200 MG/ML INJECTION    Inject 200 mg into the muscle every 14 (fourteen) days.       Start Date: 1/9/2015  End Date: --    ZOLPIDEM (AMBIEN) 5 MG TAB    Take 1 tablet (5 mg total) by mouth nightly as needed.       Start Date: 2/5/2018  End Date: 8/6/2018       Order(s) placed per written order guidelines:     Please advise.

## 2020-07-22 DIAGNOSIS — J31.0 CHRONIC RHINITIS: ICD-10-CM

## 2020-07-22 RX ORDER — FLUTICASONE PROPIONATE 50 MCG
SPRAY, SUSPENSION (ML) NASAL
Qty: 48 ML | Refills: 0 | Status: SHIPPED | OUTPATIENT
Start: 2020-07-22 | End: 2022-04-26

## 2020-07-28 DIAGNOSIS — G43.909 MIGRAINE WITHOUT STATUS MIGRAINOSUS, NOT INTRACTABLE, UNSPECIFIED MIGRAINE TYPE: ICD-10-CM

## 2020-07-28 RX ORDER — CEFUROXIME AXETIL 250 MG/1
TABLET ORAL
Qty: 1 ML | Refills: 4 | Status: SHIPPED | OUTPATIENT
Start: 2020-07-28 | End: 2020-08-21 | Stop reason: SDUPTHER

## 2020-07-28 NOTE — TELEPHONE ENCOUNTER
Last Office Visit Info:   The patient's last visit with Angelica Gomez MD was on 1/5/2018.    The patient's last visit in current department was on Visit date not found.        Last CBC Results:   Lab Results   Component Value Date    WBC 8.27 01/25/2018    HGB 15.6 01/25/2018    HCT 46.0 01/25/2018     01/25/2018       Last CMP Results  Lab Results   Component Value Date     10/15/2018    K 4.3 10/15/2018     10/15/2018    CO2 26 10/15/2018    BUN 20 10/15/2018    CREATININE 0.9 10/15/2018    CALCIUM 9.8 10/15/2018    ALBUMIN 4.4 01/30/2020    AST 36 01/30/2020    ALT 58 (H) 01/30/2020       Last Lipids  Lab Results   Component Value Date    CHOL 197 01/30/2020    TRIG 90 01/30/2020    HDL 34 (L) 01/30/2020    LDLCALC 145.0 01/30/2020       Last A1C  Lab Results   Component Value Date    HGBA1C 5.3 01/25/2018       Last TSH  Lab Results   Component Value Date    TSH 1.020 01/25/2018         Current Med Refills  Medication List with Changes/Refills   Current Medications    ALBUTEROL 90 MCG/ACTUATION INHALER    Inhale 2 puffs into the lungs every 6 (six) hours as needed.       Start Date: 5/31/2014 End Date: 2/5/2018    ASPIRIN (ECOTRIN) 81 MG EC TABLET    Take 81 mg by mouth once daily.       Start Date: --        End Date: --    ATORVASTATIN (LIPITOR) 40 MG TABLET    TAKE 1 TABLET BY MOUTH ONCE DAILY       Start Date: 12/11/2018End Date: --    ATORVASTATIN (LIPITOR) 80 MG TABLET    Take 1 tablet (80 mg total) by mouth every evening.       Start Date: 3/2/2020  End Date: --    FLUTICASONE PROPIONATE (FLONASE) 50 MCG/ACTUATION NASAL SPRAY    INSTILL 2 SPRAYS INTO EACH NOSTRIL DAILY AS NEEDED       Start Date: 7/22/2020 End Date: --    FOLIC ACID/MULTIVIT-MIN/LUTEIN (CENTRUM SILVER ORAL)    Take by mouth.       Start Date: --        End Date: --    MELOXICAM (MOBIC) 15 MG TABLET    TAKE 1 TABLET BY MOUTH EVERY DAY       Start Date: 4/22/2019 End Date: --    POLYETHYLENE GLYCOL (COLYTE) 240-22.72-6.72  -5.84 GRAM SOLR    Take 4,000 mLs (4 L total) by mouth as needed.       Start Date: 4/2/2018  End Date: --    SULFAMETHOXAZOLE-TRIMETHOPRIM 800-160MG (BACTRIM DS) 800-160 MG TAB           Start Date: 2/1/2018  End Date: --    SUMATRIPTAN (IMITREX STATDOSE) 6 MG/0.5 ML KIT    USE AS DIRECTED MAY REPEAT IN 1 HR MAX 2 DOSES PER 24 HOURS       Start Date: 7/1/2019  End Date: --    TESTOSTERONE CYPIONATE (DEPOTESTOTERONE CYPIONATE) 200 MG/ML INJECTION    Inject 200 mg into the muscle every 14 (fourteen) days.       Start Date: 1/9/2015  End Date: --    ZOLPIDEM (AMBIEN) 5 MG TAB    Take 1 tablet (5 mg total) by mouth nightly as needed.       Start Date: 2/5/2018  End Date: 8/6/2018       Order(s) placed per written order guidelines:     Please advise.

## 2020-08-21 ENCOUNTER — OFFICE VISIT (OUTPATIENT)
Dept: FAMILY MEDICINE | Facility: CLINIC | Age: 70
End: 2020-08-21
Payer: MEDICARE

## 2020-08-21 VITALS
SYSTOLIC BLOOD PRESSURE: 138 MMHG | WEIGHT: 216.5 LBS | DIASTOLIC BLOOD PRESSURE: 98 MMHG | OXYGEN SATURATION: 97 % | BODY MASS INDEX: 33.98 KG/M2 | HEIGHT: 67 IN | TEMPERATURE: 98 F | HEART RATE: 78 BPM

## 2020-08-21 DIAGNOSIS — Z00.00 ANNUAL PHYSICAL EXAM: Primary | ICD-10-CM

## 2020-08-21 DIAGNOSIS — E78.2 MIXED HYPERLIPIDEMIA: ICD-10-CM

## 2020-08-21 DIAGNOSIS — G43.909 MIGRAINE WITHOUT STATUS MIGRAINOSUS, NOT INTRACTABLE, UNSPECIFIED MIGRAINE TYPE: ICD-10-CM

## 2020-08-21 DIAGNOSIS — M75.02 ADHESIVE CAPSULITIS OF LEFT SHOULDER: ICD-10-CM

## 2020-08-21 PROCEDURE — 3080F DIAST BP >= 90 MM HG: CPT | Mod: HCNC,CPTII,S$GLB, | Performed by: FAMILY MEDICINE

## 2020-08-21 PROCEDURE — 99397 PR PREVENTIVE VISIT,EST,65 & OVER: ICD-10-PCS | Mod: HCNC,S$GLB,, | Performed by: FAMILY MEDICINE

## 2020-08-21 PROCEDURE — 3080F PR MOST RECENT DIASTOLIC BLOOD PRESSURE >= 90 MM HG: ICD-10-PCS | Mod: HCNC,CPTII,S$GLB, | Performed by: FAMILY MEDICINE

## 2020-08-21 PROCEDURE — 99499 RISK ADDL DX/OHS AUDIT: ICD-10-PCS | Mod: S$GLB,,, | Performed by: FAMILY MEDICINE

## 2020-08-21 PROCEDURE — 99397 PER PM REEVAL EST PAT 65+ YR: CPT | Mod: HCNC,S$GLB,, | Performed by: FAMILY MEDICINE

## 2020-08-21 PROCEDURE — 99999 PR PBB SHADOW E&M-EST. PATIENT-LVL IV: ICD-10-PCS | Mod: PBBFAC,HCNC,, | Performed by: FAMILY MEDICINE

## 2020-08-21 PROCEDURE — 3075F SYST BP GE 130 - 139MM HG: CPT | Mod: HCNC,CPTII,S$GLB, | Performed by: FAMILY MEDICINE

## 2020-08-21 PROCEDURE — 99999 PR PBB SHADOW E&M-EST. PATIENT-LVL IV: CPT | Mod: PBBFAC,HCNC,, | Performed by: FAMILY MEDICINE

## 2020-08-21 PROCEDURE — 3075F PR MOST RECENT SYSTOLIC BLOOD PRESS GE 130-139MM HG: ICD-10-PCS | Mod: HCNC,CPTII,S$GLB, | Performed by: FAMILY MEDICINE

## 2020-08-21 PROCEDURE — 99499 UNLISTED E&M SERVICE: CPT | Mod: S$GLB,,, | Performed by: FAMILY MEDICINE

## 2020-08-21 RX ORDER — AMOXICILLIN 500 MG
1 CAPSULE ORAL DAILY
COMMUNITY

## 2020-08-21 RX ORDER — TOPIRAMATE 25 MG/1
25 TABLET ORAL 2 TIMES DAILY
Qty: 60 TABLET | Refills: 2 | Status: SHIPPED | OUTPATIENT
Start: 2020-08-21 | End: 2020-10-07 | Stop reason: SDUPTHER

## 2020-08-21 RX ORDER — CEFUROXIME AXETIL 250 MG/1
TABLET ORAL
Qty: 1 ML | Refills: 4 | Status: SHIPPED | OUTPATIENT
Start: 2020-08-21 | End: 2021-12-22 | Stop reason: SDUPTHER

## 2020-08-21 RX ORDER — GLUCOSAMINE/CHONDRO SU A 500-400 MG
1 TABLET ORAL 3 TIMES DAILY
COMMUNITY

## 2020-08-21 NOTE — PROGRESS NOTES
Routine Office Visit    Patient Name: Tommy Keith    : 1950  MRN: [id number]    Subjective:  Tommy is a 70 y.o. male who presents today for     1. Annual physical  2. Migraine - needs imitrex refill. He states migraines are much more frequent now. He has been having more frequent episodes occurring daily. He has been having to use his imitrex more frequently. No known trigger / cause.     Review of Systems   Constitutional: Negative for chills and fever.   HENT: Negative for congestion.    Eyes: Negative for blurred vision.   Respiratory: Negative for cough.    Cardiovascular: Negative for chest pain.   Gastrointestinal: Negative for abdominal pain, constipation, diarrhea, heartburn, nausea and vomiting.   Genitourinary: Negative for dysuria.   Musculoskeletal: Positive for joint pain. Negative for myalgias.   Skin: Negative for itching and rash.   Neurological: Negative for dizziness and headaches.   Psychiatric/Behavioral: Negative for depression.       Active Problem List  Patient Active Problem List   Diagnosis    Sciatica of left side    Mixed hyperlipidemia    History of BPH    History of hepatitis C    Low serum testosterone level    Cervical spondylosis without myelopathy    Degeneration of cervical intervertebral disc    Acquired spondylolisthesis    Displacement of lumbar intervertebral disc without myelopathy    Degeneration of lumbar or lumbosacral intervertebral disc    Thoracic or lumbosacral neuritis or radiculitis, unspecified    Chest pain    Atherosclerosis of aorta    Screen for colon cancer       Past Surgical History  Past Surgical History:   Procedure Laterality Date    arthroscopy of both knees      COLONOSCOPY N/A 2018    Procedure: COLONOSCOPY;  Surgeon: Jovon Nunez MD;  Location: [facility];  Service: Endoscopy;  Laterality: N/A;    left inguinal hernia repair      LIVER BIOPSY      tonsillectomy      TONSILLECTOMY      UMBILICAL HERNIA REPAIR       VENTRAL HERNIA REPAIR         Family History  Family History   Problem Relation Age of Onset    Heart disease Father     Clotting disorder Brother        Social History  Social History     Socioeconomic History    Marital status:      Spouse name: Deloris    Number of children: 1    Years of education: Not on file    Highest education level: Not on file   Occupational History    Occupation: works at a chemical plant   Social Needs    Financial resource strain: Not on file    Food insecurity     Worry: Not on file     Inability: Not on file    Transportation needs     Medical: Not on file     Non-medical: Not on file   Tobacco Use    Smoking status: Former Smoker     Quit date: 2006     Years since quittin.0    Smokeless tobacco: Never Used   Substance and Sexual Activity    Alcohol use: No    Drug use: No    Sexual activity: Yes     Partners: Female   Lifestyle    Physical activity     Days per week: Not on file     Minutes per session: Not on file    Stress: Not on file   Relationships    Social connections     Talks on phone: Not on file     Gets together: Not on file     Attends Taoist service: Not on file     Active member of club or organization: Not on file     Attends meetings of clubs or organizations: Not on file     Relationship status: Not on file   Other Topics Concern    Not on file   Social History Narrative    He is walking regularly.       Medications and Allergies  Reviewed and updated.   Current Outpatient Medications   Medication Sig    aspirin (ECOTRIN) 81 MG EC tablet Take 81 mg by mouth once daily.    atorvastatin (LIPITOR) 40 MG tablet TAKE 1 TABLET BY MOUTH ONCE DAILY (Patient taking differently: 80 mg. )    atorvastatin (LIPITOR) 80 MG tablet Take 1 tablet (80 mg total) by mouth every evening.    fluticasone propionate (FLONASE) 50 mcg/actuation nasal spray INSTILL 2 SPRAYS INTO EACH NOSTRIL DAILY AS NEEDED    FOLIC ACID/MULTIVIT-MIN/LUTEIN  "(CENTRUM SILVER ORAL) Take by mouth.    glucosamine-chondroitin 500-400 mg tablet Take 1 tablet by mouth 3 (three) times daily.    meloxicam (MOBIC) 15 MG tablet TAKE 1 TABLET BY MOUTH EVERY DAY    omega-3 fatty acids/fish oil (FISH OIL-OMEGA-3 FATTY ACIDS) 300-1,000 mg capsule Take 1 capsule by mouth once daily.    polyethylene glycol (COLYTE) 240-22.72-6.72 -5.84 gram SolR Take 4,000 mLs (4 L total) by mouth as needed.    sumatriptan (IMITREX STATDOSE) 6 mg/0.5 mL kit USE AS DIRECTED MAY REPEAT IN 1 HR MAX 2 DOSES PER 24 HOURS    testosterone cypionate (DEPOTESTOTERONE CYPIONATE) 200 mg/mL injection Inject 200 mg into the muscle every 14 (fourteen) days.    albuterol 90 mcg/actuation inhaler Inhale 2 puffs into the lungs every 6 (six) hours as needed.    sulfamethoxazole-trimethoprim 800-160mg (BACTRIM DS) 800-160 mg Tab     topiramate (TOPAMAX) 25 MG tablet Take 1 tablet (25 mg total) by mouth 2 (two) times daily. (Patient not taking: Reported on 8/21/2020)    zolpidem (AMBIEN) 5 MG Tab Take 1 tablet (5 mg total) by mouth nightly as needed. (Patient not taking: Reported on 8/21/2020)     No current facility-administered medications for this visit.        Physical Exam  BP (!) 138/98 (BP Location: Left arm, Patient Position: Sitting)   Pulse 78   Temp 97.9 °F (36.6 °C)   Ht 5' 7" (1.702 m)   Wt 98.2 kg (216 lb 7.9 oz)   SpO2 97%   BMI 33.91 kg/m²   Physical Exam  Constitutional:       Appearance: He is well-developed.   HENT:      Head: Normocephalic and atraumatic.   Eyes:      Conjunctiva/sclera: Conjunctivae normal.      Pupils: Pupils are equal, round, and reactive to light.   Neck:      Musculoskeletal: Normal range of motion and neck supple.      Thyroid: No thyromegaly.      Vascular: No JVD.   Cardiovascular:      Rate and Rhythm: Normal rate and regular rhythm.      Heart sounds: Normal heart sounds.   Pulmonary:      Effort: Pulmonary effort is normal.      Breath sounds: Normal breath " sounds. No wheezing.   Abdominal:      General: Bowel sounds are normal. There is no distension.      Palpations: Abdomen is soft.      Tenderness: There is no abdominal tenderness. There is no guarding.   Musculoskeletal: Normal range of motion.   Lymphadenopathy:      Cervical: No cervical adenopathy.   Skin:     General: Skin is warm and dry.   Neurological:      Mental Status: He is alert and oriented to person, place, and time.   Psychiatric:         Behavior: Behavior normal.           Assessment/Plan:  Tommy Keith is a 70 y.o. male who presents today for :    Problem List Items Addressed This Visit        Cardiac/Vascular    Mixed hyperlipidemia    Relevant Orders    CBC auto differential    Comprehensive metabolic panel    Lipid Panel    TSH  The patient is asked to make an attempt to improve diet and exercise patterns to aid in medical management of this problem.  The current medical regimen is effective;  continue present plan and medications.         Other Visit Diagnoses     Annual physical exam    -  Primary  I addressed all major concerns as it related to health maintenance.  All were ordered and scheduled based on the patients wishes.  Any additional health maintenance will be readdressed at the next physical if declined or deferred by the patient.  Health Maintenance       Date Due Completion Date    TETANUS VACCINE 07/11/1970 ---    Shingles Vaccine (1 of 2) 07/11/2002 ---    Influenza Vaccine (1) 09/01/2020 10/24/2019    Pneumococcal Vaccine (65+ Low/Medium Risk) (2 of 2 - PPSV23) 10/23/2020 1/5/2018    High Dose Statin 08/21/2021 8/21/2020    Lipid Panel 01/30/2025 1/30/2020    Colorectal Cancer Screening 04/20/2028 4/20/2018            Migraine without status migrainosus, not intractable, unspecified migraine type        Relevant Medications    sumatriptan (IMITREX STATDOSE) 6 mg/0.5 mL kit    topiramate (TOPAMAX) 25 MG tablet  Common side effects of this medication were discussed with  the patient. Questions regarding medications were discussed during this visit.   Recommend starting prophylaxis       Adhesive capsulitis of left shoulder      Continue physical therapy           Follow up in about 1 year (around 8/21/2021), or if symptoms worsen or fail to improve.

## 2020-10-07 DIAGNOSIS — G43.909 MIGRAINE WITHOUT STATUS MIGRAINOSUS, NOT INTRACTABLE, UNSPECIFIED MIGRAINE TYPE: ICD-10-CM

## 2020-10-07 RX ORDER — TOPIRAMATE 25 MG/1
25 TABLET ORAL DAILY
Qty: 90 TABLET | Refills: 1 | Status: SHIPPED | OUTPATIENT
Start: 2020-10-07 | End: 2020-11-13

## 2020-10-07 NOTE — TELEPHONE ENCOUNTER
----- Message from Tri Saskia sent at 10/7/2020 12:31 PM CDT -----  Regarding: self [phone]  .Type: Patient Call Back    Who called: self     What is the request in detail: Pt stated that he's only taking topiramate (TOPAMAX) 25 MG tablet once a night and it's working good. Pt stated that taking it twice a day made him feel tired & drowsy     Can the clinic reply by [facility]? Call back     Would the patient rather a call back or a response via [facility]?  Call back     Best call back number: [phone]

## 2020-10-07 NOTE — TELEPHONE ENCOUNTER
Noted.   I will change prescription and send in prescription to pharmacy for updated dosing information

## 2020-11-17 ENCOUNTER — PATIENT OUTREACH (OUTPATIENT)
Dept: ADMINISTRATIVE | Facility: HOSPITAL | Age: 70
End: 2020-11-17

## 2020-11-17 RX ORDER — INFLUENZA A VIRUS A/MICHIGAN/45/2015 X-275 (H1N1) ANTIGEN (FORMALDEHYDE INACTIVATED), INFLUENZA A VIRUS A/SINGAPORE/INFIMH-16-0019/2016 IVR-186 (H3N2) ANTIGEN (FORMALDEHYDE INACTIVATED), INFLUENZA B VIRUS B/PHUKET/3073/2013 ANTIGEN (FORMALDEHYDE INACTIVATED), AND INFLUENZA B VIRUS B/MARYLAND/15/2016 BX-69A ANTIGEN (FORMALDEHYDE INACTIVATED) 60; 60; 60; 60 UG/.7ML; UG/.7ML; UG/.7ML; UG/.7ML
INJECTION, SUSPENSION INTRAMUSCULAR
COMMUNITY
Start: 2020-10-07 | End: 2021-06-29

## 2021-03-18 ENCOUNTER — OFFICE VISIT (OUTPATIENT)
Dept: CARDIOLOGY | Facility: CLINIC | Age: 71
End: 2021-03-18
Payer: MEDICARE

## 2021-03-18 VITALS
HEART RATE: 80 BPM | SYSTOLIC BLOOD PRESSURE: 132 MMHG | HEIGHT: 67 IN | RESPIRATION RATE: 15 BRPM | OXYGEN SATURATION: 98 % | WEIGHT: 213.38 LBS | BODY MASS INDEX: 33.49 KG/M2 | DIASTOLIC BLOOD PRESSURE: 68 MMHG

## 2021-03-18 DIAGNOSIS — E66.9 NON MORBID OBESITY, UNSPECIFIED OBESITY TYPE: ICD-10-CM

## 2021-03-18 DIAGNOSIS — I77.810 AORTIC ROOT DILATATION: ICD-10-CM

## 2021-03-18 DIAGNOSIS — R94.31 ABNORMAL EKG: ICD-10-CM

## 2021-03-18 DIAGNOSIS — G47.33 OSA (OBSTRUCTIVE SLEEP APNEA): ICD-10-CM

## 2021-03-18 DIAGNOSIS — I70.0 ATHEROSCLEROSIS OF AORTA: Primary | ICD-10-CM

## 2021-03-18 DIAGNOSIS — R53.83 FATIGUE, UNSPECIFIED TYPE: ICD-10-CM

## 2021-03-18 DIAGNOSIS — D75.1 POLYCYTHEMIA: ICD-10-CM

## 2021-03-18 DIAGNOSIS — E78.2 MIXED HYPERLIPIDEMIA: ICD-10-CM

## 2021-03-18 PROCEDURE — 99499 RISK ADDL DX/OHS AUDIT: ICD-10-PCS | Mod: S$GLB,,, | Performed by: INTERNAL MEDICINE

## 2021-03-18 PROCEDURE — 99999 PR PBB SHADOW E&M-EST. PATIENT-LVL V: CPT | Mod: PBBFAC,,, | Performed by: INTERNAL MEDICINE

## 2021-03-18 PROCEDURE — 99214 PR OFFICE/OUTPT VISIT, EST, LEVL IV, 30-39 MIN: ICD-10-PCS | Mod: S$GLB,,, | Performed by: INTERNAL MEDICINE

## 2021-03-18 PROCEDURE — 1159F PR MEDICATION LIST DOCUMENTED IN MEDICAL RECORD: ICD-10-PCS | Mod: S$GLB,,, | Performed by: INTERNAL MEDICINE

## 2021-03-18 PROCEDURE — 3288F PR FALLS RISK ASSESSMENT DOCUMENTED: ICD-10-PCS | Mod: CPTII,S$GLB,, | Performed by: INTERNAL MEDICINE

## 2021-03-18 PROCEDURE — 3078F PR MOST RECENT DIASTOLIC BLOOD PRESSURE < 80 MM HG: ICD-10-PCS | Mod: CPTII,S$GLB,, | Performed by: INTERNAL MEDICINE

## 2021-03-18 PROCEDURE — 99214 OFFICE O/P EST MOD 30 MIN: CPT | Mod: S$GLB,,, | Performed by: INTERNAL MEDICINE

## 2021-03-18 PROCEDURE — 1101F PT FALLS ASSESS-DOCD LE1/YR: CPT | Mod: CPTII,S$GLB,, | Performed by: INTERNAL MEDICINE

## 2021-03-18 PROCEDURE — 3008F PR BODY MASS INDEX (BMI) DOCUMENTED: ICD-10-PCS | Mod: CPTII,S$GLB,, | Performed by: INTERNAL MEDICINE

## 2021-03-18 PROCEDURE — 3078F DIAST BP <80 MM HG: CPT | Mod: CPTII,S$GLB,, | Performed by: INTERNAL MEDICINE

## 2021-03-18 PROCEDURE — 3008F BODY MASS INDEX DOCD: CPT | Mod: CPTII,S$GLB,, | Performed by: INTERNAL MEDICINE

## 2021-03-18 PROCEDURE — 3288F FALL RISK ASSESSMENT DOCD: CPT | Mod: CPTII,S$GLB,, | Performed by: INTERNAL MEDICINE

## 2021-03-18 PROCEDURE — 99499 UNLISTED E&M SERVICE: CPT | Mod: S$GLB,,, | Performed by: INTERNAL MEDICINE

## 2021-03-18 PROCEDURE — 3075F PR MOST RECENT SYSTOLIC BLOOD PRESS GE 130-139MM HG: ICD-10-PCS | Mod: CPTII,S$GLB,, | Performed by: INTERNAL MEDICINE

## 2021-03-18 PROCEDURE — 1126F AMNT PAIN NOTED NONE PRSNT: CPT | Mod: S$GLB,,, | Performed by: INTERNAL MEDICINE

## 2021-03-18 PROCEDURE — 93000 ELECTROCARDIOGRAM COMPLETE: CPT | Mod: S$GLB,,, | Performed by: INTERNAL MEDICINE

## 2021-03-18 PROCEDURE — 1101F PR PT FALLS ASSESS DOC 0-1 FALLS W/OUT INJ PAST YR: ICD-10-PCS | Mod: CPTII,S$GLB,, | Performed by: INTERNAL MEDICINE

## 2021-03-18 PROCEDURE — 1159F MED LIST DOCD IN RCRD: CPT | Mod: S$GLB,,, | Performed by: INTERNAL MEDICINE

## 2021-03-18 PROCEDURE — 93000 EKG 12-LEAD: ICD-10-PCS | Mod: S$GLB,,, | Performed by: INTERNAL MEDICINE

## 2021-03-18 PROCEDURE — 1126F PR PAIN SEVERITY QUANTIFIED, NO PAIN PRESENT: ICD-10-PCS | Mod: S$GLB,,, | Performed by: INTERNAL MEDICINE

## 2021-03-18 PROCEDURE — 3075F SYST BP GE 130 - 139MM HG: CPT | Mod: CPTII,S$GLB,, | Performed by: INTERNAL MEDICINE

## 2021-03-18 PROCEDURE — 99999 PR PBB SHADOW E&M-EST. PATIENT-LVL V: ICD-10-PCS | Mod: PBBFAC,,, | Performed by: INTERNAL MEDICINE

## 2021-04-19 ENCOUNTER — HOSPITAL ENCOUNTER (OUTPATIENT)
Dept: CARDIOLOGY | Facility: HOSPITAL | Age: 71
Discharge: HOME | End: 2021-04-19
Attending: INTERNAL MEDICINE
Payer: MEDICARE

## 2021-04-19 VITALS — HEIGHT: 67 IN | BODY MASS INDEX: 33.43 KG/M2 | WEIGHT: 213 LBS

## 2021-04-19 DIAGNOSIS — R53.83 FATIGUE, UNSPECIFIED TYPE: ICD-10-CM

## 2021-04-19 DIAGNOSIS — R94.31 ABNORMAL EKG: ICD-10-CM

## 2021-04-19 LAB
AORTIC ROOT ANNULUS: 3.83 CM
AORTIC VALVE CUSP SEPERATION: 2.5 CM
ASCENDING AORTA: 2.93 CM
AV INDEX (PROSTH): 1.04
AV MEAN GRADIENT: 5 MMHG
AV PEAK GRADIENT: 7 MMHG
AV VALVE AREA: 5.24 CM2
AV VELOCITY RATIO: 1.05
BSA FOR ECHO PROCEDURE: 2.14 M2
CV ECHO LV RWT: 0.97 CM
CV STRESS BASE HR: 75 BPM
DIASTOLIC BLOOD PRESSURE: 99 MMHG
DOP CALC AO PEAK VEL: 1.35 M/S
DOP CALC AO VTI: 27.33 CM
DOP CALC LVOT AREA: 5.1 CM2
DOP CALC LVOT DIAMETER: 2.54 CM
DOP CALC LVOT PEAK VEL: 1.42 M/S
DOP CALC LVOT STROKE VOLUME: 143.33 CM3
DOP CALCLVOT PEAK VEL VTI: 28.3 CM
E WAVE DECELERATION TIME: 293.47 MSEC
E/A RATIO: 0.55
E/E' RATIO: 6 M/S
ECHO LV POSTERIOR WALL: 1.57 CM (ref 0.6–1.1)
EJECTION FRACTION: 60 %
FRACTIONAL SHORTENING: 28 % (ref 28–44)
INTERVENTRICULAR SEPTUM: 1.54 CM (ref 0.6–1.1)
IVRT: 89.97 MSEC
LA MAJOR: 5.57 CM
LA MINOR: 5.94 CM
LA WIDTH: 3.95 CM
LEFT ATRIUM SIZE: 4 CM
LEFT ATRIUM VOLUME INDEX: 37.1 ML/M2
LEFT ATRIUM VOLUME: 77.21 CM3
LEFT INTERNAL DIMENSION IN SYSTOLE: 2.34 CM (ref 2.1–4)
LEFT VENTRICLE DIASTOLIC VOLUME INDEX: 20.11 ML/M2
LEFT VENTRICLE DIASTOLIC VOLUME: 41.83 ML
LEFT VENTRICLE MASS INDEX: 89 G/M2
LEFT VENTRICLE SYSTOLIC VOLUME INDEX: 9.1 ML/M2
LEFT VENTRICLE SYSTOLIC VOLUME: 18.94 ML
LEFT VENTRICULAR INTERNAL DIMENSION IN DIASTOLE: 3.23 CM (ref 3.5–6)
LEFT VENTRICULAR MASS: 184.59 G
LV LATERAL E/E' RATIO: 4.8 M/S
LV SEPTAL E/E' RATIO: 8 M/S
MV PEAK A VEL: 0.87 M/S
MV PEAK E VEL: 0.48 M/S
MV STENOSIS PRESSURE HALF TIME: 85.11 MS
MV VALVE AREA P 1/2 METHOD: 2.58 CM2
OHS CV CPX 1 MINUTE RECOVERY HEART RATE: 120 BPM
OHS CV CPX 85 PERCENT MAX PREDICTED HEART RATE MALE: 129
OHS CV CPX ESTIMATED METS: 10
OHS CV CPX MAX PREDICTED HEART RATE: 152
OHS CV CPX PATIENT IS FEMALE: 0
OHS CV CPX PATIENT IS MALE: 1
OHS CV CPX PEAK DIASTOLIC BLOOD PRESSURE: 88 MMHG
OHS CV CPX PEAK HEAR RATE: 142 BPM
OHS CV CPX PEAK RATE PRESSURE PRODUCT: ABNORMAL
OHS CV CPX PEAK SYSTOLIC BLOOD PRESSURE: 201 MMHG
OHS CV CPX PERCENT MAX PREDICTED HEART RATE ACHIEVED: 93
OHS CV CPX RATE PRESSURE PRODUCT PRESENTING: ABNORMAL
PISA TR MAX VEL: 2.37 M/S
PULM VEIN S/D RATIO: 2
PV PEAK D VEL: 0.3 M/S
PV PEAK S VEL: 0.6 M/S
PV PEAK VELOCITY: 1.05 CM/S
RA MAJOR: 5.66 CM
RA PRESSURE: 3 MMHG
RA WIDTH: 3.67 CM
RIGHT VENTRICULAR END-DIASTOLIC DIMENSION: 4.11 CM
RV TISSUE DOPPLER FREE WALL SYSTOLIC VELOCITY 1 (APICAL 4 CHAMBER VIEW): 8.56 CM/S
SINUS: 3.68 CM
STJ: 3 CM
STRESS ANGINA INDEX: 0
STRESS ECHO POST EXERCISE DUR MIN: 8 MINUTES
STRESS ECHO POST EXERCISE DUR SEC: 53 SECONDS
SYSTOLIC BLOOD PRESSURE: 150 MMHG
TDI LATERAL: 0.1 M/S
TDI SEPTAL: 0.06 M/S
TDI: 0.08 M/S
TR MAX PG: 22 MMHG
TRICUSPID ANNULAR PLANE SYSTOLIC EXCURSION: 1.52 CM
TV REST PULMONARY ARTERY PRESSURE: 25 MMHG

## 2021-04-19 PROCEDURE — 93325 STRESS ECHO (CUPID ONLY): ICD-10-PCS | Mod: 26,,, | Performed by: INTERNAL MEDICINE

## 2021-04-19 PROCEDURE — 93320 STRESS ECHO (CUPID ONLY): ICD-10-PCS | Mod: 26,,, | Performed by: INTERNAL MEDICINE

## 2021-04-19 PROCEDURE — 93351 STRESS ECHO (CUPID ONLY): ICD-10-PCS | Mod: 26,,, | Performed by: INTERNAL MEDICINE

## 2021-04-19 PROCEDURE — 93320 DOPPLER ECHO COMPLETE: CPT | Mod: 26,,, | Performed by: INTERNAL MEDICINE

## 2021-04-19 PROCEDURE — 93325 DOPPLER ECHO COLOR FLOW MAPG: CPT | Mod: 26,,, | Performed by: INTERNAL MEDICINE

## 2021-04-19 PROCEDURE — 93351 STRESS TTE COMPLETE: CPT | Mod: 26,,, | Performed by: INTERNAL MEDICINE

## 2021-04-19 PROCEDURE — 93320 DOPPLER ECHO COMPLETE: CPT

## 2021-04-22 ENCOUNTER — OFFICE VISIT (OUTPATIENT)
Dept: CARDIOLOGY | Facility: CLINIC | Age: 71
End: 2021-04-22
Payer: MEDICARE

## 2021-04-22 VITALS
HEIGHT: 67 IN | DIASTOLIC BLOOD PRESSURE: 64 MMHG | SYSTOLIC BLOOD PRESSURE: 130 MMHG | OXYGEN SATURATION: 95 % | HEART RATE: 84 BPM | RESPIRATION RATE: 15 BRPM | WEIGHT: 214.75 LBS | BODY MASS INDEX: 33.71 KG/M2

## 2021-04-22 DIAGNOSIS — E66.9 NON MORBID OBESITY, UNSPECIFIED OBESITY TYPE: ICD-10-CM

## 2021-04-22 DIAGNOSIS — R94.31 ABNORMAL EKG: Primary | ICD-10-CM

## 2021-04-22 DIAGNOSIS — E78.2 MIXED HYPERLIPIDEMIA: ICD-10-CM

## 2021-04-22 DIAGNOSIS — I70.0 ATHEROSCLEROSIS OF AORTA: ICD-10-CM

## 2021-04-22 DIAGNOSIS — I77.810 AORTIC ROOT DILATATION: ICD-10-CM

## 2021-04-22 DIAGNOSIS — R53.83 FATIGUE, UNSPECIFIED TYPE: ICD-10-CM

## 2021-04-22 DIAGNOSIS — D75.1 POLYCYTHEMIA: ICD-10-CM

## 2021-04-22 PROCEDURE — 3288F FALL RISK ASSESSMENT DOCD: CPT | Mod: CPTII,S$GLB,, | Performed by: INTERNAL MEDICINE

## 2021-04-22 PROCEDURE — 1101F PT FALLS ASSESS-DOCD LE1/YR: CPT | Mod: CPTII,S$GLB,, | Performed by: INTERNAL MEDICINE

## 2021-04-22 PROCEDURE — 1126F AMNT PAIN NOTED NONE PRSNT: CPT | Mod: S$GLB,,, | Performed by: INTERNAL MEDICINE

## 2021-04-22 PROCEDURE — 3288F PR FALLS RISK ASSESSMENT DOCUMENTED: ICD-10-PCS | Mod: CPTII,S$GLB,, | Performed by: INTERNAL MEDICINE

## 2021-04-22 PROCEDURE — 99214 PR OFFICE/OUTPT VISIT, EST, LEVL IV, 30-39 MIN: ICD-10-PCS | Mod: S$GLB,,, | Performed by: INTERNAL MEDICINE

## 2021-04-22 PROCEDURE — 3008F PR BODY MASS INDEX (BMI) DOCUMENTED: ICD-10-PCS | Mod: CPTII,S$GLB,, | Performed by: INTERNAL MEDICINE

## 2021-04-22 PROCEDURE — 99999 PR PBB SHADOW E&M-EST. PATIENT-LVL IV: CPT | Mod: PBBFAC,,, | Performed by: INTERNAL MEDICINE

## 2021-04-22 PROCEDURE — 99999 PR PBB SHADOW E&M-EST. PATIENT-LVL IV: ICD-10-PCS | Mod: PBBFAC,,, | Performed by: INTERNAL MEDICINE

## 2021-04-22 PROCEDURE — 1126F PR PAIN SEVERITY QUANTIFIED, NO PAIN PRESENT: ICD-10-PCS | Mod: S$GLB,,, | Performed by: INTERNAL MEDICINE

## 2021-04-22 PROCEDURE — 1101F PR PT FALLS ASSESS DOC 0-1 FALLS W/OUT INJ PAST YR: ICD-10-PCS | Mod: CPTII,S$GLB,, | Performed by: INTERNAL MEDICINE

## 2021-04-22 PROCEDURE — 1159F PR MEDICATION LIST DOCUMENTED IN MEDICAL RECORD: ICD-10-PCS | Mod: S$GLB,,, | Performed by: INTERNAL MEDICINE

## 2021-04-22 PROCEDURE — 3008F BODY MASS INDEX DOCD: CPT | Mod: CPTII,S$GLB,, | Performed by: INTERNAL MEDICINE

## 2021-04-22 PROCEDURE — 99214 OFFICE O/P EST MOD 30 MIN: CPT | Mod: S$GLB,,, | Performed by: INTERNAL MEDICINE

## 2021-04-22 PROCEDURE — 99499 UNLISTED E&M SERVICE: CPT | Mod: S$GLB,,, | Performed by: INTERNAL MEDICINE

## 2021-04-22 PROCEDURE — 1159F MED LIST DOCD IN RCRD: CPT | Mod: S$GLB,,, | Performed by: INTERNAL MEDICINE

## 2021-04-22 PROCEDURE — 99499 RISK ADDL DX/OHS AUDIT: ICD-10-PCS | Mod: S$GLB,,, | Performed by: INTERNAL MEDICINE

## 2021-05-14 DIAGNOSIS — R07.89 OTHER CHEST PAIN: ICD-10-CM

## 2021-05-14 DIAGNOSIS — C73 THYROID CANCER: Primary | ICD-10-CM

## 2021-05-14 DIAGNOSIS — M43.10 ACQUIRED SPONDYLOLISTHESIS: ICD-10-CM

## 2021-05-17 ENCOUNTER — HOSPITAL ENCOUNTER (OUTPATIENT)
Dept: RADIOLOGY | Facility: HOSPITAL | Age: 71
Discharge: HOME | End: 2021-05-17
Attending: INTERNAL MEDICINE
Payer: MEDICARE

## 2021-05-17 ENCOUNTER — OFFICE VISIT (OUTPATIENT)
Dept: PULMONOLOGY | Facility: CLINIC | Age: 71
End: 2021-05-17
Payer: MEDICARE

## 2021-05-17 VITALS
HEART RATE: 76 BPM | WEIGHT: 215.19 LBS | BODY MASS INDEX: 33.78 KG/M2 | DIASTOLIC BLOOD PRESSURE: 89 MMHG | OXYGEN SATURATION: 96 % | HEIGHT: 67 IN | SYSTOLIC BLOOD PRESSURE: 130 MMHG

## 2021-05-17 DIAGNOSIS — M43.10 ACQUIRED SPONDYLOLISTHESIS: ICD-10-CM

## 2021-05-17 DIAGNOSIS — J84.10 CALCIFIED GRANULOMA OF LUNG: ICD-10-CM

## 2021-05-17 DIAGNOSIS — J47.9 BRONCHIECTASIS WITHOUT COMPLICATION: ICD-10-CM

## 2021-05-17 PROBLEM — J98.4 CALCIFIED GRANULOMA OF LUNG: Status: ACTIVE | Noted: 2021-05-17

## 2021-05-17 PROCEDURE — 3008F PR BODY MASS INDEX (BMI) DOCUMENTED: ICD-10-PCS | Mod: CPTII,S$GLB,, | Performed by: INTERNAL MEDICINE

## 2021-05-17 PROCEDURE — 99499 RISK ADDL DX/OHS AUDIT: ICD-10-PCS | Mod: HCNC,S$GLB,, | Performed by: INTERNAL MEDICINE

## 2021-05-17 PROCEDURE — 99203 OFFICE O/P NEW LOW 30 MIN: CPT | Mod: GC,S$GLB,, | Performed by: INTERNAL MEDICINE

## 2021-05-17 PROCEDURE — 99203 PR OFFICE/OUTPT VISIT, NEW, LEVL III, 30-44 MIN: ICD-10-PCS | Mod: GC,S$GLB,, | Performed by: INTERNAL MEDICINE

## 2021-05-17 PROCEDURE — 71046 X-RAY EXAM CHEST 2 VIEWS: CPT | Mod: TC,FY

## 2021-05-17 PROCEDURE — 99999 PR PBB SHADOW E&M-EST. PATIENT-LVL III: CPT | Mod: PBBFAC,,, | Performed by: INTERNAL MEDICINE

## 2021-05-17 PROCEDURE — 99499 UNLISTED E&M SERVICE: CPT | Mod: HCNC,S$GLB,, | Performed by: INTERNAL MEDICINE

## 2021-05-17 PROCEDURE — 71046 X-RAY EXAM CHEST 2 VIEWS: CPT | Mod: 26,,, | Performed by: RADIOLOGY

## 2021-05-17 PROCEDURE — 71046 XR CHEST PA AND LATERAL: ICD-10-PCS | Mod: 26,,, | Performed by: RADIOLOGY

## 2021-05-17 PROCEDURE — 3288F FALL RISK ASSESSMENT DOCD: CPT | Mod: CPTII,S$GLB,, | Performed by: INTERNAL MEDICINE

## 2021-05-17 PROCEDURE — 1101F PR PT FALLS ASSESS DOC 0-1 FALLS W/OUT INJ PAST YR: ICD-10-PCS | Mod: CPTII,S$GLB,, | Performed by: INTERNAL MEDICINE

## 2021-05-17 PROCEDURE — 1101F PT FALLS ASSESS-DOCD LE1/YR: CPT | Mod: CPTII,S$GLB,, | Performed by: INTERNAL MEDICINE

## 2021-05-17 PROCEDURE — 99999 PR PBB SHADOW E&M-EST. PATIENT-LVL III: ICD-10-PCS | Mod: PBBFAC,,, | Performed by: INTERNAL MEDICINE

## 2021-05-17 PROCEDURE — 3288F PR FALLS RISK ASSESSMENT DOCUMENTED: ICD-10-PCS | Mod: CPTII,S$GLB,, | Performed by: INTERNAL MEDICINE

## 2021-05-17 PROCEDURE — 1126F AMNT PAIN NOTED NONE PRSNT: CPT | Mod: S$GLB,,, | Performed by: INTERNAL MEDICINE

## 2021-05-17 PROCEDURE — 3008F BODY MASS INDEX DOCD: CPT | Mod: CPTII,S$GLB,, | Performed by: INTERNAL MEDICINE

## 2021-05-17 PROCEDURE — 1126F PR PAIN SEVERITY QUANTIFIED, NO PAIN PRESENT: ICD-10-PCS | Mod: S$GLB,,, | Performed by: INTERNAL MEDICINE

## 2021-05-17 PROCEDURE — 1159F MED LIST DOCD IN RCRD: CPT | Mod: S$GLB,,, | Performed by: INTERNAL MEDICINE

## 2021-05-17 PROCEDURE — 1159F PR MEDICATION LIST DOCUMENTED IN MEDICAL RECORD: ICD-10-PCS | Mod: S$GLB,,, | Performed by: INTERNAL MEDICINE

## 2021-06-29 ENCOUNTER — LAB VISIT (OUTPATIENT)
Dept: LAB | Facility: HOSPITAL | Age: 71
End: 2021-06-29
Attending: FAMILY MEDICINE
Payer: MEDICARE

## 2021-06-29 ENCOUNTER — OFFICE VISIT (OUTPATIENT)
Dept: FAMILY MEDICINE | Facility: CLINIC | Age: 71
End: 2021-06-29
Payer: MEDICARE

## 2021-06-29 VITALS
WEIGHT: 215.81 LBS | HEART RATE: 75 BPM | SYSTOLIC BLOOD PRESSURE: 136 MMHG | OXYGEN SATURATION: 95 % | HEIGHT: 67 IN | TEMPERATURE: 98 F | BODY MASS INDEX: 33.87 KG/M2 | DIASTOLIC BLOOD PRESSURE: 88 MMHG

## 2021-06-29 DIAGNOSIS — Z23 NEED FOR PROPHYLACTIC VACCINATION WITH TETANUS-DIPHTHERIA (TD): Primary | ICD-10-CM

## 2021-06-29 DIAGNOSIS — E78.2 MIXED HYPERLIPIDEMIA: ICD-10-CM

## 2021-06-29 DIAGNOSIS — G43.909 MIGRAINE WITHOUT STATUS MIGRAINOSUS, NOT INTRACTABLE, UNSPECIFIED MIGRAINE TYPE: ICD-10-CM

## 2021-06-29 LAB
ALBUMIN SERPL BCP-MCNC: 4.1 G/DL (ref 3.5–5.2)
ALP SERPL-CCNC: 78 U/L (ref 55–135)
ALT SERPL W/O P-5'-P-CCNC: 65 U/L (ref 10–44)
ANION GAP SERPL CALC-SCNC: 8 MMOL/L (ref 8–16)
AST SERPL-CCNC: 44 U/L (ref 10–40)
BASOPHILS # BLD AUTO: 0.05 K/UL (ref 0–0.2)
BASOPHILS NFR BLD: 0.6 % (ref 0–1.9)
BILIRUB SERPL-MCNC: 1 MG/DL (ref 0.1–1)
BUN SERPL-MCNC: 23 MG/DL (ref 8–23)
CALCIUM SERPL-MCNC: 10.1 MG/DL (ref 8.7–10.5)
CHLORIDE SERPL-SCNC: 107 MMOL/L (ref 95–110)
CHOLEST SERPL-MCNC: 194 MG/DL (ref 120–199)
CHOLEST/HDLC SERPL: 5.9 {RATIO} (ref 2–5)
CO2 SERPL-SCNC: 21 MMOL/L (ref 23–29)
CREAT SERPL-MCNC: 0.9 MG/DL (ref 0.5–1.4)
DIFFERENTIAL METHOD: ABNORMAL
EOSINOPHIL # BLD AUTO: 0.1 K/UL (ref 0–0.5)
EOSINOPHIL NFR BLD: 1.7 % (ref 0–8)
ERYTHROCYTE [DISTWIDTH] IN BLOOD BY AUTOMATED COUNT: 13.3 % (ref 11.5–14.5)
EST. GFR  (AFRICAN AMERICAN): >60 ML/MIN/1.73 M^2
EST. GFR  (NON AFRICAN AMERICAN): >60 ML/MIN/1.73 M^2
GLUCOSE SERPL-MCNC: 99 MG/DL (ref 70–110)
HCT VFR BLD AUTO: 53.1 % (ref 40–54)
HDLC SERPL-MCNC: 33 MG/DL (ref 40–75)
HDLC SERPL: 17 % (ref 20–50)
HGB BLD-MCNC: 17.5 G/DL (ref 14–18)
IMM GRANULOCYTES # BLD AUTO: 0.03 K/UL (ref 0–0.04)
IMM GRANULOCYTES NFR BLD AUTO: 0.4 % (ref 0–0.5)
LDLC SERPL CALC-MCNC: 144 MG/DL (ref 63–159)
LYMPHOCYTES # BLD AUTO: 1.8 K/UL (ref 1–4.8)
LYMPHOCYTES NFR BLD: 22.8 % (ref 18–48)
MCH RBC QN AUTO: 31.6 PG (ref 27–31)
MCHC RBC AUTO-ENTMCNC: 33 G/DL (ref 32–36)
MCV RBC AUTO: 96 FL (ref 82–98)
MONOCYTES # BLD AUTO: 0.8 K/UL (ref 0.3–1)
MONOCYTES NFR BLD: 10.6 % (ref 4–15)
NEUTROPHILS # BLD AUTO: 5 K/UL (ref 1.8–7.7)
NEUTROPHILS NFR BLD: 63.9 % (ref 38–73)
NONHDLC SERPL-MCNC: 161 MG/DL
NRBC BLD-RTO: 0 /100 WBC
PLATELET # BLD AUTO: 210 K/UL (ref 150–450)
PMV BLD AUTO: 10.6 FL (ref 9.2–12.9)
POTASSIUM SERPL-SCNC: 4.9 MMOL/L (ref 3.5–5.1)
PROT SERPL-MCNC: 7.7 G/DL (ref 6–8.4)
RBC # BLD AUTO: 5.53 M/UL (ref 4.6–6.2)
SODIUM SERPL-SCNC: 136 MMOL/L (ref 136–145)
TRIGL SERPL-MCNC: 85 MG/DL (ref 30–150)
TSH SERPL DL<=0.005 MIU/L-ACNC: 1.11 UIU/ML (ref 0.4–4)
WBC # BLD AUTO: 7.84 K/UL (ref 3.9–12.7)

## 2021-06-29 PROCEDURE — 3288F PR FALLS RISK ASSESSMENT DOCUMENTED: ICD-10-PCS | Mod: CPTII,S$GLB,, | Performed by: FAMILY MEDICINE

## 2021-06-29 PROCEDURE — 99214 PR OFFICE/OUTPT VISIT, EST, LEVL IV, 30-39 MIN: ICD-10-PCS | Mod: 25,S$GLB,, | Performed by: FAMILY MEDICINE

## 2021-06-29 PROCEDURE — 90714 TD VACC NO PRESV 7 YRS+ IM: CPT | Mod: S$GLB,,, | Performed by: FAMILY MEDICINE

## 2021-06-29 PROCEDURE — 99999 PR PBB SHADOW E&M-EST. PATIENT-LVL IV: ICD-10-PCS | Mod: PBBFAC,,, | Performed by: FAMILY MEDICINE

## 2021-06-29 PROCEDURE — 1159F PR MEDICATION LIST DOCUMENTED IN MEDICAL RECORD: ICD-10-PCS | Mod: S$GLB,,, | Performed by: FAMILY MEDICINE

## 2021-06-29 PROCEDURE — 90714 TD VACCINE GREATER THAN OR EQUAL TO 7YO PRESERVATIVE FREE IM: ICD-10-PCS | Mod: S$GLB,,, | Performed by: FAMILY MEDICINE

## 2021-06-29 PROCEDURE — 3288F FALL RISK ASSESSMENT DOCD: CPT | Mod: CPTII,S$GLB,, | Performed by: FAMILY MEDICINE

## 2021-06-29 PROCEDURE — 3008F PR BODY MASS INDEX (BMI) DOCUMENTED: ICD-10-PCS | Mod: CPTII,S$GLB,, | Performed by: FAMILY MEDICINE

## 2021-06-29 PROCEDURE — 99214 OFFICE O/P EST MOD 30 MIN: CPT | Mod: 25,S$GLB,, | Performed by: FAMILY MEDICINE

## 2021-06-29 PROCEDURE — 84443 ASSAY THYROID STIM HORMONE: CPT | Performed by: FAMILY MEDICINE

## 2021-06-29 PROCEDURE — 1101F PR PT FALLS ASSESS DOC 0-1 FALLS W/OUT INJ PAST YR: ICD-10-PCS | Mod: CPTII,S$GLB,, | Performed by: FAMILY MEDICINE

## 2021-06-29 PROCEDURE — 36415 COLL VENOUS BLD VENIPUNCTURE: CPT | Mod: PO | Performed by: FAMILY MEDICINE

## 2021-06-29 PROCEDURE — 1126F PR PAIN SEVERITY QUANTIFIED, NO PAIN PRESENT: ICD-10-PCS | Mod: S$GLB,,, | Performed by: FAMILY MEDICINE

## 2021-06-29 PROCEDURE — 90471 IMMUNIZATION ADMIN: CPT | Mod: S$GLB,,, | Performed by: FAMILY MEDICINE

## 2021-06-29 PROCEDURE — 99999 PR PBB SHADOW E&M-EST. PATIENT-LVL IV: CPT | Mod: PBBFAC,,, | Performed by: FAMILY MEDICINE

## 2021-06-29 PROCEDURE — 85025 COMPLETE CBC W/AUTO DIFF WBC: CPT | Performed by: FAMILY MEDICINE

## 2021-06-29 PROCEDURE — 1126F AMNT PAIN NOTED NONE PRSNT: CPT | Mod: S$GLB,,, | Performed by: FAMILY MEDICINE

## 2021-06-29 PROCEDURE — 90471 TD VACCINE GREATER THAN OR EQUAL TO 7YO PRESERVATIVE FREE IM: ICD-10-PCS | Mod: S$GLB,,, | Performed by: FAMILY MEDICINE

## 2021-06-29 PROCEDURE — 1159F MED LIST DOCD IN RCRD: CPT | Mod: S$GLB,,, | Performed by: FAMILY MEDICINE

## 2021-06-29 PROCEDURE — 3008F BODY MASS INDEX DOCD: CPT | Mod: CPTII,S$GLB,, | Performed by: FAMILY MEDICINE

## 2021-06-29 PROCEDURE — 80053 COMPREHEN METABOLIC PANEL: CPT | Performed by: FAMILY MEDICINE

## 2021-06-29 PROCEDURE — 1101F PT FALLS ASSESS-DOCD LE1/YR: CPT | Mod: CPTII,S$GLB,, | Performed by: FAMILY MEDICINE

## 2021-06-29 PROCEDURE — 80061 LIPID PANEL: CPT | Performed by: FAMILY MEDICINE

## 2021-06-29 RX ORDER — AMMONIUM LACTATE 12 G/100G
CREAM TOPICAL
COMMUNITY
Start: 2021-06-09 | End: 2021-06-29

## 2021-06-29 RX ORDER — TOPIRAMATE 25 MG/1
75 TABLET ORAL NIGHTLY
Qty: 270 TABLET | Refills: 0 | Status: SHIPPED | OUTPATIENT
Start: 2021-06-29 | End: 2021-09-23

## 2021-09-14 ENCOUNTER — PES CALL (OUTPATIENT)
Dept: ADMINISTRATIVE | Facility: CLINIC | Age: 71
End: 2021-09-14

## 2021-09-16 ENCOUNTER — OFFICE VISIT (OUTPATIENT)
Dept: FAMILY MEDICINE | Facility: CLINIC | Age: 71
End: 2021-09-16
Payer: MEDICARE

## 2021-09-16 VITALS
HEIGHT: 67 IN | HEART RATE: 83 BPM | OXYGEN SATURATION: 96 % | SYSTOLIC BLOOD PRESSURE: 124 MMHG | TEMPERATURE: 99 F | BODY MASS INDEX: 33.1 KG/M2 | DIASTOLIC BLOOD PRESSURE: 78 MMHG | WEIGHT: 210.88 LBS

## 2021-09-16 DIAGNOSIS — Z87.438 HISTORY OF BPH: ICD-10-CM

## 2021-09-16 DIAGNOSIS — M51.26 DISPLACEMENT OF LUMBAR INTERVERTEBRAL DISC WITHOUT MYELOPATHY: ICD-10-CM

## 2021-09-16 DIAGNOSIS — M43.10 ACQUIRED SPONDYLOLISTHESIS: ICD-10-CM

## 2021-09-16 DIAGNOSIS — Z86.19 HISTORY OF HEPATITIS C: ICD-10-CM

## 2021-09-16 DIAGNOSIS — E78.2 MIXED HYPERLIPIDEMIA: ICD-10-CM

## 2021-09-16 DIAGNOSIS — M51.37 DEGENERATION OF LUMBAR OR LUMBOSACRAL INTERVERTEBRAL DISC: ICD-10-CM

## 2021-09-16 DIAGNOSIS — M50.30 DEGENERATION OF CERVICAL INTERVERTEBRAL DISC: ICD-10-CM

## 2021-09-16 DIAGNOSIS — M47.812 CERVICAL SPONDYLOSIS WITHOUT MYELOPATHY: ICD-10-CM

## 2021-09-16 DIAGNOSIS — Z00.00 ENCOUNTER FOR PREVENTIVE HEALTH EXAMINATION: Primary | ICD-10-CM

## 2021-09-16 DIAGNOSIS — J84.10 CALCIFIED GRANULOMA OF LUNG: ICD-10-CM

## 2021-09-16 DIAGNOSIS — I70.0 ATHEROSCLEROSIS OF AORTA: ICD-10-CM

## 2021-09-16 DIAGNOSIS — J47.9 BRONCHIECTASIS WITHOUT COMPLICATION: ICD-10-CM

## 2021-09-16 DIAGNOSIS — M54.32 SCIATICA OF LEFT SIDE: ICD-10-CM

## 2021-09-16 DIAGNOSIS — R79.89 LOW SERUM TESTOSTERONE LEVEL: ICD-10-CM

## 2021-09-16 PROCEDURE — 3074F SYST BP LT 130 MM HG: CPT | Mod: HCNC,CPTII,S$GLB, | Performed by: NURSE PRACTITIONER

## 2021-09-16 PROCEDURE — 3078F DIAST BP <80 MM HG: CPT | Mod: HCNC,CPTII,S$GLB, | Performed by: NURSE PRACTITIONER

## 2021-09-16 PROCEDURE — 1159F PR MEDICATION LIST DOCUMENTED IN MEDICAL RECORD: ICD-10-PCS | Mod: HCNC,CPTII,S$GLB, | Performed by: NURSE PRACTITIONER

## 2021-09-16 PROCEDURE — G0439 PPPS, SUBSEQ VISIT: HCPCS | Mod: HCNC,S$GLB,, | Performed by: NURSE PRACTITIONER

## 2021-09-16 PROCEDURE — 3008F BODY MASS INDEX DOCD: CPT | Mod: HCNC,CPTII,S$GLB, | Performed by: NURSE PRACTITIONER

## 2021-09-16 PROCEDURE — 1126F AMNT PAIN NOTED NONE PRSNT: CPT | Mod: HCNC,CPTII,S$GLB, | Performed by: NURSE PRACTITIONER

## 2021-09-16 PROCEDURE — 3288F PR FALLS RISK ASSESSMENT DOCUMENTED: ICD-10-PCS | Mod: HCNC,CPTII,S$GLB, | Performed by: NURSE PRACTITIONER

## 2021-09-16 PROCEDURE — 99499 RISK ADDL DX/OHS AUDIT: ICD-10-PCS | Mod: S$GLB,,, | Performed by: NURSE PRACTITIONER

## 2021-09-16 PROCEDURE — 3008F PR BODY MASS INDEX (BMI) DOCUMENTED: ICD-10-PCS | Mod: HCNC,CPTII,S$GLB, | Performed by: NURSE PRACTITIONER

## 2021-09-16 PROCEDURE — 1160F RVW MEDS BY RX/DR IN RCRD: CPT | Mod: HCNC,CPTII,S$GLB, | Performed by: NURSE PRACTITIONER

## 2021-09-16 PROCEDURE — 99499 UNLISTED E&M SERVICE: CPT | Mod: S$GLB,,, | Performed by: NURSE PRACTITIONER

## 2021-09-16 PROCEDURE — 3288F FALL RISK ASSESSMENT DOCD: CPT | Mod: HCNC,CPTII,S$GLB, | Performed by: NURSE PRACTITIONER

## 2021-09-16 PROCEDURE — 3074F PR MOST RECENT SYSTOLIC BLOOD PRESSURE < 130 MM HG: ICD-10-PCS | Mod: HCNC,CPTII,S$GLB, | Performed by: NURSE PRACTITIONER

## 2021-09-16 PROCEDURE — 1126F PR PAIN SEVERITY QUANTIFIED, NO PAIN PRESENT: ICD-10-PCS | Mod: HCNC,CPTII,S$GLB, | Performed by: NURSE PRACTITIONER

## 2021-09-16 PROCEDURE — G0439 PR MEDICARE ANNUAL WELLNESS SUBSEQUENT VISIT: ICD-10-PCS | Mod: HCNC,S$GLB,, | Performed by: NURSE PRACTITIONER

## 2021-09-16 PROCEDURE — 1101F PR PT FALLS ASSESS DOC 0-1 FALLS W/OUT INJ PAST YR: ICD-10-PCS | Mod: HCNC,CPTII,S$GLB, | Performed by: NURSE PRACTITIONER

## 2021-09-16 PROCEDURE — 99999 PR PBB SHADOW E&M-EST. PATIENT-LVL V: CPT | Mod: PBBFAC,HCNC,, | Performed by: NURSE PRACTITIONER

## 2021-09-16 PROCEDURE — 1101F PT FALLS ASSESS-DOCD LE1/YR: CPT | Mod: HCNC,CPTII,S$GLB, | Performed by: NURSE PRACTITIONER

## 2021-09-16 PROCEDURE — 1159F MED LIST DOCD IN RCRD: CPT | Mod: HCNC,CPTII,S$GLB, | Performed by: NURSE PRACTITIONER

## 2021-09-16 PROCEDURE — 99999 PR PBB SHADOW E&M-EST. PATIENT-LVL V: ICD-10-PCS | Mod: PBBFAC,HCNC,, | Performed by: NURSE PRACTITIONER

## 2021-09-16 PROCEDURE — 1160F PR REVIEW ALL MEDS BY PRESCRIBER/CLIN PHARMACIST DOCUMENTED: ICD-10-PCS | Mod: HCNC,CPTII,S$GLB, | Performed by: NURSE PRACTITIONER

## 2021-09-16 PROCEDURE — 3078F PR MOST RECENT DIASTOLIC BLOOD PRESSURE < 80 MM HG: ICD-10-PCS | Mod: HCNC,CPTII,S$GLB, | Performed by: NURSE PRACTITIONER

## 2021-12-22 DIAGNOSIS — G43.909 MIGRAINE WITHOUT STATUS MIGRAINOSUS, NOT INTRACTABLE, UNSPECIFIED MIGRAINE TYPE: ICD-10-CM

## 2021-12-22 RX ORDER — CEFUROXIME AXETIL 250 MG/1
TABLET ORAL
Qty: 1 ML | Refills: 4 | Status: SHIPPED | OUTPATIENT
Start: 2021-12-22 | End: 2022-01-03 | Stop reason: SDUPTHER

## 2021-12-28 ENCOUNTER — HOSPITAL ENCOUNTER (EMERGENCY)
Facility: HOSPITAL | Age: 71
Discharge: HOME | End: 2021-12-29
Attending: EMERGENCY MEDICINE
Payer: MEDICARE

## 2021-12-28 DIAGNOSIS — R05.9 COUGH: ICD-10-CM

## 2021-12-28 DIAGNOSIS — U07.1 COVID-19: Primary | ICD-10-CM

## 2021-12-28 PROCEDURE — 99283 EMERGENCY DEPT VISIT LOW MDM: CPT | Mod: 25,HCNC

## 2021-12-29 VITALS
HEART RATE: 96 BPM | HEIGHT: 67 IN | OXYGEN SATURATION: 94 % | SYSTOLIC BLOOD PRESSURE: 121 MMHG | RESPIRATION RATE: 20 BRPM | DIASTOLIC BLOOD PRESSURE: 52 MMHG | TEMPERATURE: 99 F | BODY MASS INDEX: 32.18 KG/M2 | WEIGHT: 205 LBS

## 2021-12-29 PROCEDURE — 25000242 PHARM REV CODE 250 ALT 637 W/ HCPCS: Mod: HCNC | Performed by: EMERGENCY MEDICINE

## 2021-12-29 PROCEDURE — 25000003 PHARM REV CODE 250: Mod: HCNC | Performed by: EMERGENCY MEDICINE

## 2021-12-29 PROCEDURE — 94640 AIRWAY INHALATION TREATMENT: CPT | Mod: HCNC

## 2021-12-29 RX ORDER — ALBUTEROL SULFATE 90 UG/1
8 AEROSOL, METERED RESPIRATORY (INHALATION) ONCE
Status: COMPLETED | OUTPATIENT
Start: 2021-12-29 | End: 2021-12-29

## 2021-12-29 RX ORDER — ACETAMINOPHEN 500 MG
1000 TABLET ORAL
Status: COMPLETED | OUTPATIENT
Start: 2021-12-29 | End: 2021-12-29

## 2021-12-29 RX ORDER — IBUPROFEN 400 MG/1
400 TABLET ORAL
Status: COMPLETED | OUTPATIENT
Start: 2021-12-29 | End: 2021-12-29

## 2021-12-29 RX ORDER — ONDANSETRON 4 MG/1
4 TABLET, ORALLY DISINTEGRATING ORAL
Status: COMPLETED | OUTPATIENT
Start: 2021-12-29 | End: 2021-12-29

## 2021-12-29 RX ADMIN — ACETAMINOPHEN 1000 MG: 500 TABLET ORAL at 02:12

## 2021-12-29 RX ADMIN — IPRATROPIUM BROMIDE 8 PUFF: 17 AEROSOL, METERED RESPIRATORY (INHALATION) at 03:12

## 2021-12-29 RX ADMIN — ONDANSETRON 4 MG: 4 TABLET, ORALLY DISINTEGRATING ORAL at 02:12

## 2021-12-29 RX ADMIN — IBUPROFEN 400 MG: 400 TABLET, FILM COATED ORAL at 02:12

## 2021-12-29 RX ADMIN — ALBUTEROL SULFATE 8 PUFF: 108 INHALANT RESPIRATORY (INHALATION) at 03:12

## 2021-12-29 NOTE — ED PROVIDER NOTES
Encounter Date: 12/28/2021       History     Chief Complaint   Patient presents with    Fever     Dx with Covid 5 days ago. Reports increased SOB and uncontrollable fever at home. TMAX 104, taking tylenol and motrin q4 without relief.      71-year-old male who tested positive for the COVID-19 virus 5 days ago presents with worsening symptoms.  He states that he has been taking acetaminophen and ibuprofen.  States that he has had a productive cough.  States that he has not been taking any other medications for his symptoms.  He denies any chest pain/pressure/tightness.          Review of patient's allergies indicates:   Allergen Reactions    Levaquin [levofloxacin]      Past Medical History:   Diagnosis Date    Asthma, intermittent     irritant induced    History of BPH     s/p laser TURP    History of hepatitis C     s/p treatment, in remission    History of migraine headaches     History of multiple pulmonary nodules     Hyperlipidemia     Low serum testosterone level     Migraine headache     OA (osteoarthritis)     Obesity     Sciatica of left side     Spinal stenosis      Past Surgical History:   Procedure Laterality Date    arthroscopy of both knees      COLONOSCOPY N/A 4/20/2018    Procedure: COLONOSCOPY;  Surgeon: Jovon Nunez MD;  [facility]: [facility];  Service: Endoscopy;  Laterality: N/A;    left inguinal hernia repair      LIVER BIOPSY      tonsillectomy      TONSILLECTOMY      UMBILICAL HERNIA REPAIR      VENTRAL HERNIA REPAIR       Family History   Problem Relation Age of Onset    Heart disease Father     Clotting disorder Brother     Diabetes Brother     Hyperlipidemia Brother     Cancer Brother     Diabetes Sister     Hyperlipidemia Sister      Social History     Tobacco Use    Smoking status: Former Smoker     Quit date: 8/16/2006     Years since quitting: 15.3    Smokeless tobacco: Never Used   Substance Use Topics    Alcohol use: No    Drug use: No     Review of  Systems   Constitutional: Positive for fever.   Respiratory: Positive for cough.    All other systems reviewed and are negative.      Physical Exam     Initial Vitals [12/28/21 2330]   BP Pulse Resp Temp SpO2   137/78 109 20 (!) 102.6 °F (39.2 °C) 96 %      MAP       --         Physical Exam    Constitutional: He appears well-developed and well-nourished.   HENT:   Head: Normocephalic and atraumatic.   Eyes: Pupils are equal, round, and reactive to light.   Cardiovascular:   Tachycardic rate and regular rhythm   Pulmonary/Chest: Breath sounds normal. He has no wheezes.   Abdominal: Abdomen is soft. There is no abdominal tenderness.   Musculoskeletal:         General: Normal range of motion.     Neurological: He is alert and oriented to person, place, and time.   Skin: Skin is warm and dry. Capillary refill takes less than 2 seconds.   Psychiatric: He has a normal mood and affect.         ED Course   Procedures  Labs Reviewed - No data to display       Imaging Results          X-Ray Chest 1 View (Final result)  Result time 12/29/21 03:07:14    Final result by Angie Harrison MD (12/29/21 03:07:14)                 Impression:      Diffuse increased interstitial attenuation/bilateral interstitial opacities which can be seen with underlying infectious process, such as COVID-19 pneumonia, or non-infectious inflammatory process or possibly pulmonary edema. Clinical correlation is advised.      Electronically signed by: Angie Harrison MD  Date:    12/29/2021  Time:    03:07             Narrative:    EXAMINATION:  XR CHEST 1 VIEW    CLINICAL HISTORY:  Cough, unspecified    TECHNIQUE:  Single frontal view of the chest was performed.    COMPARISON:  05/17/2021    FINDINGS:  Mediastinal structures are midline.  The cardiomediastinal silhouette is unchanged in size and configuration.  There are low lung volumes bilaterally.  Lungs demonstrate diffuse increased interstitial attenuation/interstitial opacities bilaterally which can  be seen with underlying infectious process such as COVID-19 pneumonia, or non infectious inflammatory process or possibly pulmonary edema.  Clinical correlation is advised.  No large region of confluent airspace consolidation, large volume of pleural fluid or pneumothorax identified.  Visualized osseous structures are intact.                                 Medications   acetaminophen tablet 1,000 mg (1,000 mg Oral Given 12/29/21 0232)   ibuprofen tablet 400 mg (400 mg Oral Given 12/29/21 0232)   albuterol inhaler 8 puff (8 puffs Inhalation Given 12/29/21 0329)   ipratropium inhaler 8 puff (8 puffs Inhalation Given 12/29/21 0329)   ondansetron disintegrating tablet 4 mg (4 mg Oral Given 12/29/21 0232)     Medical Decision Making:   ED Management:  Patient is afebrile, his cough and shortness of breath have improved after inhaler therapy and he is able to tolerate p.o. fluids.  There is no reason for admission.  An outpatient consult to the infusion center was set up.  He will be discharged with a prescription for Zofran and Combivent.  He will continue antipyretics as prescribed.  Instructed patient to return immediately for any new or worsening symptoms and he verbalized understanding.             ED Course as of 12/29/21 1934   Wed Dec 29, 2021   0321 Patient looks much better in the room, no tachypnea, no work of breathing or, no coughing.  Patient afebrile able to tolerate fluids [CD]      ED Course User Index  [CD] Lopez Arceo DO             Clinical Impression:   Final diagnoses:  [R05.9] Cough  [U07.1] COVID-19 (Primary)          ED Disposition Condition    Discharge Stable        ED Prescriptions     Medication Sig Dispense Start Date End Date Auth. Provider    ipratropium-albuteroL (COMBIVENT)  mcg/actuation inhaler Inhale 2 puffs into the lungs every 4 (four) hours as needed for Wheezing. Rescue 4 g 12/29/2021  Lopez Arceo,         Follow-up Information     Follow up With  Specialties Details Why Contact Info    Angelica Gomez MD Family Medicine, Wound Care Call in 1 day  [address] [facility] [place] [id number] [phone]             Lopez Arceo,   12/29/21 1934

## 2021-12-29 NOTE — ED NOTES
Pulse oximetry test used to measure the oxygen level  Conducted in the hallway patient approx at 93% on room air

## 2022-01-03 ENCOUNTER — OFFICE VISIT (OUTPATIENT)
Dept: FAMILY MEDICINE | Facility: CLINIC | Age: 72
End: 2022-01-03
Payer: MEDICARE

## 2022-01-03 DIAGNOSIS — U07.1 COVID: Primary | ICD-10-CM

## 2022-01-03 DIAGNOSIS — I70.0 ATHEROSCLEROSIS OF AORTA: ICD-10-CM

## 2022-01-03 DIAGNOSIS — J47.9 BRONCHIECTASIS WITHOUT COMPLICATION: ICD-10-CM

## 2022-01-03 DIAGNOSIS — G43.909 MIGRAINE WITHOUT STATUS MIGRAINOSUS, NOT INTRACTABLE, UNSPECIFIED MIGRAINE TYPE: ICD-10-CM

## 2022-01-03 PROCEDURE — 1160F RVW MEDS BY RX/DR IN RCRD: CPT | Mod: HCNC,CPTII,95, | Performed by: FAMILY MEDICINE

## 2022-01-03 PROCEDURE — 99499 UNLISTED E&M SERVICE: CPT | Mod: HCNC,95,, | Performed by: FAMILY MEDICINE

## 2022-01-03 PROCEDURE — 99499 RISK ADDL DX/OHS AUDIT: ICD-10-PCS | Mod: HCNC,95,, | Performed by: FAMILY MEDICINE

## 2022-01-03 PROCEDURE — 1159F MED LIST DOCD IN RCRD: CPT | Mod: HCNC,CPTII,95, | Performed by: FAMILY MEDICINE

## 2022-01-03 PROCEDURE — 99214 PR OFFICE/OUTPT VISIT, EST, LEVL IV, 30-39 MIN: ICD-10-PCS | Mod: HCNC,95,, | Performed by: FAMILY MEDICINE

## 2022-01-03 PROCEDURE — 1160F PR REVIEW ALL MEDS BY PRESCRIBER/CLIN PHARMACIST DOCUMENTED: ICD-10-PCS | Mod: HCNC,CPTII,95, | Performed by: FAMILY MEDICINE

## 2022-01-03 PROCEDURE — 1159F PR MEDICATION LIST DOCUMENTED IN MEDICAL RECORD: ICD-10-PCS | Mod: HCNC,CPTII,95, | Performed by: FAMILY MEDICINE

## 2022-01-03 PROCEDURE — 99214 OFFICE O/P EST MOD 30 MIN: CPT | Mod: HCNC,95,, | Performed by: FAMILY MEDICINE

## 2022-01-03 RX ORDER — CEFUROXIME AXETIL 250 MG/1
TABLET ORAL
Qty: 1 ML | Refills: 4 | Status: SHIPPED | OUTPATIENT
Start: 2022-01-03 | End: 2022-03-11 | Stop reason: SDUPTHER

## 2022-01-03 RX ORDER — DEXAMETHASONE 6 MG/1
6 TABLET ORAL 2 TIMES DAILY WITH MEALS
Qty: 10 TABLET | Refills: 0 | Status: SHIPPED | OUTPATIENT
Start: 2022-01-03 | End: 2022-01-08

## 2022-01-03 RX ORDER — ONDANSETRON 4 MG/1
4 TABLET, ORALLY DISINTEGRATING ORAL EVERY 6 HOURS PRN
Qty: 30 TABLET | Refills: 1 | Status: SHIPPED | OUTPATIENT
Start: 2022-01-03 | End: 2022-01-11

## 2022-01-03 RX ORDER — CODEINE PHOSPHATE AND GUAIFENESIN 10; 100 MG/5ML; MG/5ML
5 SOLUTION ORAL EVERY 6 HOURS PRN
Qty: 180 ML | Refills: 0 | Status: SHIPPED | OUTPATIENT
Start: 2022-01-03 | End: 2022-01-11 | Stop reason: SDUPTHER

## 2022-01-03 NOTE — PROGRESS NOTES
Telemedicine Office Visit    Tommy Keith    1950  [id number]    Subjective     The patient location is: home   The chief complaint leading to consultation is: covid   Visit type: Virtual visit with synchronous audio and video  Total time spent with patient: 15 minutes   Each patient to whom he or she provides medical services by telemedicine is:  (1) informed of the relationship between the physician and patient and the respective role of any other health care provider with respect to management of the patient; and (2) notified that he or she may decline to receive medical services by telemedicine and may withdraw from such care at any time.    Tommy is a 71 y.o. male who presents through telemedicine for:     1. covid - Patient has recent covid infection. He has been treating with otc cough medications and drinking fluids. He feels more congested and has a tight feeling in his lungs / chest. He feels like he needs to cough but nothing comes up. His oxygen saturation as been in the 90s and he has had low grade fever. His wife is a nurse. Symptoms are being monitored   2. Migraine - Patient needs imtrex refilled. Migraines are less frequent and more controlled. No side effects.     Objective     Review of Systems   Constitutional: Negative for chills and fever.   HENT: Negative for congestion.    Eyes: Negative for blurred vision.   Respiratory: Negative for cough.    Cardiovascular: Negative for chest pain.   Gastrointestinal: Negative for abdominal pain, constipation, diarrhea, heartburn, nausea and vomiting.   Genitourinary: Negative for dysuria.   Musculoskeletal: Negative for myalgias.   Skin: Negative for itching and rash.   Neurological: Negative for dizziness and headaches.   Psychiatric/Behavioral: Negative for depression.       There were no vitals taken for this visit.  Physical Exam  Constitutional:       General: He is not in acute distress.     Appearance: He is well-developed and  well-nourished. He is not diaphoretic.   HENT:      Head: Normocephalic and atraumatic.      Right Ear: External ear normal.      Left Ear: External ear normal.      Nose: Nose normal.   Eyes:      Extraocular Movements: EOM normal.      Conjunctiva/sclera: Conjunctivae normal.   Pulmonary:      Effort: Pulmonary effort is normal.   Musculoskeletal:      Cervical back: Normal range of motion.   Skin:     Findings: No rash.   Neurological:      Mental Status: He is alert and oriented to person, place, and time.   Psychiatric:         Mood and Affect: Mood and affect normal.         Behavior: Behavior normal.         Thought Content: Thought content normal.         Judgment: Judgment normal.           Plan     Problem List Items Addressed This Visit        Pulmonary    Bronchiectasis without complication    Overview     Asymptomatic.  No further pulmonary follow up unless symptoms should develop            Cardiac/Vascular    Atherosclerosis of aorta  Patient with Atherosclerosis of the Aorta.  Stable/asymptomatic. Currently stable on lipid lowering treatment and b/p monitoring.        Other Visit Diagnoses     COVID    -  Primary    Relevant Medications    dexAMETHasone (DECADRON) 6 MG tablet    ondansetron (ZOFRAN-ODT) 4 MG TbDL    guaiFENesin-codeine 100-10 mg/5 ml (GUAIFENESIN AC)  mg/5 mL syrup  Continue monitoring for symptoms  Consider repeat cxr if symptoms do not improve       Migraine without status migrainosus, not intractable, unspecified migraine type        Relevant Medications    sumatriptan (IMITREX STATDOSE) 6 mg/0.5 mL kit            No follow-ups on file.

## 2022-01-11 ENCOUNTER — TELEPHONE (OUTPATIENT)
Dept: FAMILY MEDICINE | Facility: CLINIC | Age: 72
End: 2022-01-11
Payer: MEDICARE

## 2022-01-11 ENCOUNTER — HOSPITAL ENCOUNTER (OUTPATIENT)
Dept: RADIOLOGY | Facility: HOSPITAL | Age: 72
Discharge: HOME | End: 2022-01-11
Attending: FAMILY MEDICINE
Payer: MEDICARE

## 2022-01-11 ENCOUNTER — OFFICE VISIT (OUTPATIENT)
Dept: FAMILY MEDICINE | Facility: CLINIC | Age: 72
End: 2022-01-11
Payer: MEDICARE

## 2022-01-11 VITALS
BODY MASS INDEX: 33.08 KG/M2 | HEIGHT: 67 IN | WEIGHT: 210.75 LBS | DIASTOLIC BLOOD PRESSURE: 70 MMHG | TEMPERATURE: 98 F | OXYGEN SATURATION: 97 % | HEART RATE: 87 BPM | SYSTOLIC BLOOD PRESSURE: 120 MMHG

## 2022-01-11 DIAGNOSIS — G43.909 MIGRAINE WITHOUT STATUS MIGRAINOSUS, NOT INTRACTABLE, UNSPECIFIED MIGRAINE TYPE: ICD-10-CM

## 2022-01-11 DIAGNOSIS — E78.2 MIXED HYPERLIPIDEMIA: ICD-10-CM

## 2022-01-11 DIAGNOSIS — U07.1 COVID: ICD-10-CM

## 2022-01-11 DIAGNOSIS — J84.9 INTERSTITIAL PNEUMONIA: ICD-10-CM

## 2022-01-11 DIAGNOSIS — R53.83 FATIGUE, UNSPECIFIED TYPE: Primary | ICD-10-CM

## 2022-01-11 DIAGNOSIS — J84.10 CALCIFIED GRANULOMA OF LUNG: ICD-10-CM

## 2022-01-11 DIAGNOSIS — J47.9 BRONCHIECTASIS WITHOUT COMPLICATION: ICD-10-CM

## 2022-01-11 DIAGNOSIS — I70.0 ATHEROSCLEROSIS OF AORTA: ICD-10-CM

## 2022-01-11 PROCEDURE — 3008F BODY MASS INDEX DOCD: CPT | Mod: HCNC,CPTII,S$GLB, | Performed by: FAMILY MEDICINE

## 2022-01-11 PROCEDURE — 99999 PR PBB SHADOW E&M-EST. PATIENT-LVL V: ICD-10-PCS | Mod: PBBFAC,HCNC,, | Performed by: FAMILY MEDICINE

## 2022-01-11 PROCEDURE — 1101F PR PT FALLS ASSESS DOC 0-1 FALLS W/OUT INJ PAST YR: ICD-10-PCS | Mod: HCNC,CPTII,S$GLB, | Performed by: FAMILY MEDICINE

## 2022-01-11 PROCEDURE — 94640 AIRWAY INHALATION TREATMENT: CPT | Mod: HCNC,S$GLB,, | Performed by: FAMILY MEDICINE

## 2022-01-11 PROCEDURE — 1101F PT FALLS ASSESS-DOCD LE1/YR: CPT | Mod: HCNC,CPTII,S$GLB, | Performed by: FAMILY MEDICINE

## 2022-01-11 PROCEDURE — 99999 PR PBB SHADOW E&M-EST. PATIENT-LVL V: CPT | Mod: PBBFAC,HCNC,, | Performed by: FAMILY MEDICINE

## 2022-01-11 PROCEDURE — 3078F DIAST BP <80 MM HG: CPT | Mod: HCNC,CPTII,S$GLB, | Performed by: FAMILY MEDICINE

## 2022-01-11 PROCEDURE — 3074F SYST BP LT 130 MM HG: CPT | Mod: HCNC,CPTII,S$GLB, | Performed by: FAMILY MEDICINE

## 2022-01-11 PROCEDURE — 3074F PR MOST RECENT SYSTOLIC BLOOD PRESSURE < 130 MM HG: ICD-10-PCS | Mod: HCNC,CPTII,S$GLB, | Performed by: FAMILY MEDICINE

## 2022-01-11 PROCEDURE — 99215 PR OFFICE/OUTPT VISIT, EST, LEVL V, 40-54 MIN: ICD-10-PCS | Mod: HCNC,25,S$GLB, | Performed by: FAMILY MEDICINE

## 2022-01-11 PROCEDURE — 71046 X-RAY EXAM CHEST 2 VIEWS: CPT | Mod: TC,HCNC,FY,PO

## 2022-01-11 PROCEDURE — 3078F PR MOST RECENT DIASTOLIC BLOOD PRESSURE < 80 MM HG: ICD-10-PCS | Mod: HCNC,CPTII,S$GLB, | Performed by: FAMILY MEDICINE

## 2022-01-11 PROCEDURE — 3008F PR BODY MASS INDEX (BMI) DOCUMENTED: ICD-10-PCS | Mod: HCNC,CPTII,S$GLB, | Performed by: FAMILY MEDICINE

## 2022-01-11 PROCEDURE — 3044F HG A1C LEVEL LT 7.0%: CPT | Mod: HCNC,CPTII,S$GLB, | Performed by: FAMILY MEDICINE

## 2022-01-11 PROCEDURE — 71046 X-RAY EXAM CHEST 2 VIEWS: CPT | Mod: 26,HCNC,, | Performed by: RADIOLOGY

## 2022-01-11 PROCEDURE — 99215 OFFICE O/P EST HI 40 MIN: CPT | Mod: HCNC,25,S$GLB, | Performed by: FAMILY MEDICINE

## 2022-01-11 PROCEDURE — 71046 XR CHEST PA AND LATERAL: ICD-10-PCS | Mod: 26,HCNC,, | Performed by: RADIOLOGY

## 2022-01-11 PROCEDURE — 94640 PR INHAL RX, AIRWAY OBST/DX SPUTUM INDUCT: ICD-10-PCS | Mod: HCNC,S$GLB,, | Performed by: FAMILY MEDICINE

## 2022-01-11 PROCEDURE — 1126F AMNT PAIN NOTED NONE PRSNT: CPT | Mod: HCNC,CPTII,S$GLB, | Performed by: FAMILY MEDICINE

## 2022-01-11 PROCEDURE — 3044F PR MOST RECENT HEMOGLOBIN A1C LEVEL <7.0%: ICD-10-PCS | Mod: HCNC,CPTII,S$GLB, | Performed by: FAMILY MEDICINE

## 2022-01-11 PROCEDURE — 1126F PR PAIN SEVERITY QUANTIFIED, NO PAIN PRESENT: ICD-10-PCS | Mod: HCNC,CPTII,S$GLB, | Performed by: FAMILY MEDICINE

## 2022-01-11 PROCEDURE — 3288F PR FALLS RISK ASSESSMENT DOCUMENTED: ICD-10-PCS | Mod: HCNC,CPTII,S$GLB, | Performed by: FAMILY MEDICINE

## 2022-01-11 PROCEDURE — 3288F FALL RISK ASSESSMENT DOCD: CPT | Mod: HCNC,CPTII,S$GLB, | Performed by: FAMILY MEDICINE

## 2022-01-11 RX ORDER — IPRATROPIUM BROMIDE AND ALBUTEROL SULFATE 2.5; .5 MG/3ML; MG/3ML
3 SOLUTION RESPIRATORY (INHALATION)
Status: COMPLETED | OUTPATIENT
Start: 2022-01-11 | End: 2022-01-11

## 2022-01-11 RX ORDER — CODEINE PHOSPHATE AND GUAIFENESIN 10; 100 MG/5ML; MG/5ML
5 SOLUTION ORAL EVERY 6 HOURS PRN
Qty: 180 ML | Refills: 0 | Status: SHIPPED | OUTPATIENT
Start: 2022-01-11 | End: 2022-01-21

## 2022-01-11 RX ORDER — AZITHROMYCIN 250 MG/1
TABLET, FILM COATED ORAL
Qty: 6 TABLET | Refills: 0 | Status: SHIPPED | OUTPATIENT
Start: 2022-01-11 | End: 2022-01-16

## 2022-01-11 RX ADMIN — IPRATROPIUM BROMIDE AND ALBUTEROL SULFATE 3 ML: 2.5; .5 SOLUTION RESPIRATORY (INHALATION) at 09:01

## 2022-01-11 NOTE — PROGRESS NOTES
"Routine Office Visit    Tommy Keith  1950  [id number]      Subjective     Tommy is a 71 y.o. male who presents today for:    1. covid - Patient had recent covid infection confirmed by outside test approximately 2 weeks ago - Patient has been self treating with fluids, nsaids and otc cough medications. His wife is a nurse and has been monitoring his oxygen saturation which as been in the 90s or higher. Patient reports feeling fatigued, like he can't take deep breaths. Oxygen saturation does not drop with exertion. He has been using incentive spirometry.     Objective     Review of Systems   Constitutional: Negative for chills and fever.   HENT: Negative for congestion.    Eyes: Negative for blurred vision.   Respiratory: Negative for cough.    Cardiovascular: Negative for chest pain.   Gastrointestinal: Negative for abdominal pain, constipation, diarrhea, heartburn, nausea and vomiting.   Genitourinary: Negative for dysuria.   Musculoskeletal: Negative for myalgias.   Skin: Negative for itching and rash.   Neurological: Negative for dizziness and headaches.   Psychiatric/Behavioral: Negative for depression.       /70 (BP Location: Left arm, Patient Position: Sitting, BP Method: Large (Manual))   Pulse 87   Temp 98 °F (36.7 °C) (Oral)   Ht 5' 7" (1.702 m)   Wt 95.6 kg (210 lb 12.2 oz)   SpO2 97%   BMI 33.01 kg/m²   Physical Exam  Constitutional:       Appearance: He is well-developed and well-nourished.   HENT:      Head: Normocephalic and atraumatic.   Eyes:      Extraocular Movements: EOM normal.      Conjunctiva/sclera: Conjunctivae normal.      Pupils: Pupils are equal, round, and reactive to light.   Neck:      Thyroid: No thyromegaly.      Vascular: No JVD.   Cardiovascular:      Rate and Rhythm: Normal rate and regular rhythm.      Heart sounds: Normal heart sounds.   Pulmonary:      Effort: Pulmonary effort is normal.      Breath sounds: Wheezing and rhonchi present.   Abdominal:      " General: Bowel sounds are normal. There is no distension.      Palpations: Abdomen is soft.      Tenderness: There is no abdominal tenderness. There is no guarding.   Musculoskeletal:         General: Normal range of motion.      Cervical back: Normal range of motion and neck supple.   Lymphadenopathy:      Cervical: No cervical adenopathy.   Skin:     General: Skin is warm and dry.   Neurological:      Mental Status: He is alert and oriented to person, place, and time.   Psychiatric:         Mood and Affect: Mood and affect normal.         Behavior: Behavior normal.           Assessment     Problem List Items Addressed This Visit        Pulmonary    Bronchiectasis without complication    Overview     Asymptomatic.  No further pulmonary follow up unless symptoms should develop         Calcified granuloma of lung    Overview     Benign, requires no further work up.            Cardiac/Vascular    Atherosclerosis of aorta    Overview     US Abdominal AortaPerformed 1/16/2018  Patient with Atherosclerosis of the Aorta.  Stable/asymptomatic. Currently stable on lipid lowering treatment and b/p monitoring.           Mixed hyperlipidemia      Other Visit Diagnoses     Fatigue, unspecified type    -  Primary    Relevant Orders    Comprehensive Metabolic Panel (Completed)    Hemoglobin A1C (Completed)    TSH (Completed)    CBC Auto Differential (Completed)    COVID        Relevant Medications    guaiFENesin-codeine 100-10 mg/5 ml (GUAIFENESIN AC)  mg/5 mL syrup    Other Relevant Orders    X-Ray Chest PA And Lateral (Completed)    Interstitial pneumonia        Relevant Medications    albuterol-ipratropium 2.5 mg-0.5 mg/3 mL nebulizer solution 3 mL (Completed)    albuterol-ipratropium 2.5 mg-0.5 mg/3 mL nebulizer solution 3 mL (Completed)    ipratropium-albuteroL (COMBIVENT)  mcg/actuation inhaler    azithromycin (Z-TRAVIS) 250 MG tablet  From covid   Recommend f/u with pulmonology   Will f/u with blood work    Breathing and shortness of breath improved with duoneb treatments done in clinic   Decreasing wheezing and rhonchi also noted on exam on repeat checks         Other Relevant Orders    Ambulatory referral/consult to Pulmonology    Migraine without status migrainosus, not intractable, unspecified migraine type      The current medical regimen is effective;  continue present plan and medications.            Follow up if symptoms worsen or fail to improve.

## 2022-01-11 NOTE — TELEPHONE ENCOUNTER
----- Message from Angelica Gomez MD sent at 1/11/2022  2:37 PM CST -----  X-ray shows interstitial pneumonia which can cause the fatigue and shortness of breath  I sent in z-vidal to cover for bacterial causes but I believe it is from covid   I have place a referral to pulmonology for follow-up   Please continue to use the incentive spirometry

## 2022-01-12 ENCOUNTER — TELEPHONE (OUTPATIENT)
Dept: PULMONOLOGY | Facility: CLINIC | Age: 72
End: 2022-01-12
Payer: MEDICARE

## 2022-01-12 ENCOUNTER — TELEPHONE (OUTPATIENT)
Dept: FAMILY MEDICINE | Facility: CLINIC | Age: 72
End: 2022-01-12
Payer: MEDICARE

## 2022-01-12 NOTE — TELEPHONE ENCOUNTER
----- Message from Amy Ignacio MA sent at 1/12/2022  1:49 PM CST -----  Regarding: referral  Patient has a referral in for pulmonary and is already established with Dr Mackey whom he is requesting to see can you please assist with scheduling.           Thanks in advance

## 2022-01-12 NOTE — TELEPHONE ENCOUNTER
Spoke to patient regarding a referral to Pulmonolog patient is requesting to Dr Mackey whom he is already established with, message was sent to Dr Mackey's staff to assist with scheduling

## 2022-01-13 ENCOUNTER — TELEPHONE (OUTPATIENT)
Dept: FAMILY MEDICINE | Facility: CLINIC | Age: 72
End: 2022-01-13
Payer: MEDICARE

## 2022-01-13 NOTE — TELEPHONE ENCOUNTER
----- Message from Angelica Gomez MD sent at 1/13/2022  8:04 AM CST -----  Please schedule Patient for follow-up in a few weeks with me.   Blood work shows non-resolving infection   I will need to recheck x-ray and blood work.   Patient needs to make sure to drink plenty of fluids and electrolytes.  Protein and sodium were a little low.

## 2022-01-18 DIAGNOSIS — E78.5 HYPERLIPIDEMIA, UNSPECIFIED HYPERLIPIDEMIA TYPE: ICD-10-CM

## 2022-01-18 NOTE — TELEPHONE ENCOUNTER
No new care gaps identified.  Powered by [employer]. Reference number: [id number].   1/18/2022 9:08:40 AM CST

## 2022-01-19 ENCOUNTER — PATIENT MESSAGE (OUTPATIENT)
Dept: FAMILY MEDICINE | Facility: CLINIC | Age: 72
End: 2022-01-19
Payer: MEDICARE

## 2022-01-25 ENCOUNTER — TELEPHONE (OUTPATIENT)
Dept: FAMILY MEDICINE | Facility: CLINIC | Age: 72
End: 2022-01-25
Payer: MEDICARE

## 2022-01-25 RX ORDER — PROMETHAZINE HYDROCHLORIDE AND DEXTROMETHORPHAN HYDROBROMIDE 6.25; 15 MG/5ML; MG/5ML
SYRUP ORAL
COMMUNITY
Start: 2021-12-30 | End: 2022-07-14

## 2022-01-25 RX ORDER — INFLUENZA A VIRUS A/VICTORIA/2570/2019 IVR-215 (H1N1) ANTIGEN (FORMALDEHYDE INACTIVATED), INFLUENZA A VIRUS A/TASMANIA/503/2020 IVR-221 (H3N2) ANTIGEN (FORMALDEHYDE INACTIVATED), INFLUENZA B VIRUS B/PHUKET/3073/2013 ANTIGEN (FORMALDEHYDE INACTIVATED), AND INFLUENZA B VIRUS B/WASHINGTON/02/2019 ANTIGEN (FORMALDEHYDE INACTIVATED) 60; 60; 60; 60 UG/.7ML; UG/.7ML; UG/.7ML; UG/.7ML
INJECTION, SUSPENSION INTRAMUSCULAR
COMMUNITY
Start: 2021-12-09 | End: 2024-02-01

## 2022-01-25 RX ORDER — BENZONATATE 100 MG/1
CAPSULE ORAL
COMMUNITY
Start: 2021-12-30 | End: 2022-07-14 | Stop reason: ALTCHOICE

## 2022-01-25 NOTE — TELEPHONE ENCOUNTER
----- Message from John Sarabia sent at 1/25/2022  1:17 PM CST -----  Regarding: status of a refill  Type: Patient Call Back    Who called:Tommy     What is the request in detail: Patient is calling to check on the status of a refill for: atorvastatin calcium 80 MG . Patient would like it sent to [facility] order pharmacy.    Can the clinic reply by [facility]?no    Would the patient rather a call back or a response via [facility]? Call back     Best call back number:[phone]

## 2022-01-25 NOTE — TELEPHONE ENCOUNTER
Duplicate not needed already ordered in another encounter.      atorvastatin (LIPITOR) 80 MG tablet (Order [id number]) was reordered and pended by Angelica Gomez MD

## 2022-02-03 ENCOUNTER — OFFICE VISIT (OUTPATIENT)
Dept: PULMONOLOGY | Facility: CLINIC | Age: 72
End: 2022-02-03
Payer: MEDICARE

## 2022-02-03 VITALS
DIASTOLIC BLOOD PRESSURE: 102 MMHG | SYSTOLIC BLOOD PRESSURE: 159 MMHG | HEIGHT: 67 IN | HEART RATE: 76 BPM | WEIGHT: 207.13 LBS | OXYGEN SATURATION: 96 % | BODY MASS INDEX: 32.51 KG/M2

## 2022-02-03 DIAGNOSIS — J47.9 BRONCHIECTASIS WITHOUT COMPLICATION: Primary | ICD-10-CM

## 2022-02-03 DIAGNOSIS — J84.9 INTERSTITIAL PNEUMONIA: ICD-10-CM

## 2022-02-03 PROCEDURE — 1159F MED LIST DOCD IN RCRD: CPT | Mod: HCNC,CPTII,S$GLB, | Performed by: INTERNAL MEDICINE

## 2022-02-03 PROCEDURE — 3288F FALL RISK ASSESSMENT DOCD: CPT | Mod: HCNC,CPTII,S$GLB, | Performed by: INTERNAL MEDICINE

## 2022-02-03 PROCEDURE — 3080F DIAST BP >= 90 MM HG: CPT | Mod: HCNC,CPTII,S$GLB, | Performed by: INTERNAL MEDICINE

## 2022-02-03 PROCEDURE — 1101F PR PT FALLS ASSESS DOC 0-1 FALLS W/OUT INJ PAST YR: ICD-10-PCS | Mod: HCNC,CPTII,S$GLB, | Performed by: INTERNAL MEDICINE

## 2022-02-03 PROCEDURE — 3008F BODY MASS INDEX DOCD: CPT | Mod: HCNC,CPTII,S$GLB, | Performed by: INTERNAL MEDICINE

## 2022-02-03 PROCEDURE — 99215 PR OFFICE/OUTPT VISIT, EST, LEVL V, 40-54 MIN: ICD-10-PCS | Mod: HCNC,S$GLB,, | Performed by: INTERNAL MEDICINE

## 2022-02-03 PROCEDURE — 3044F PR MOST RECENT HEMOGLOBIN A1C LEVEL <7.0%: ICD-10-PCS | Mod: HCNC,CPTII,S$GLB, | Performed by: INTERNAL MEDICINE

## 2022-02-03 PROCEDURE — 99215 OFFICE O/P EST HI 40 MIN: CPT | Mod: HCNC,S$GLB,, | Performed by: INTERNAL MEDICINE

## 2022-02-03 PROCEDURE — 1159F PR MEDICATION LIST DOCUMENTED IN MEDICAL RECORD: ICD-10-PCS | Mod: HCNC,CPTII,S$GLB, | Performed by: INTERNAL MEDICINE

## 2022-02-03 PROCEDURE — 3077F PR MOST RECENT SYSTOLIC BLOOD PRESSURE >= 140 MM HG: ICD-10-PCS | Mod: HCNC,CPTII,S$GLB, | Performed by: INTERNAL MEDICINE

## 2022-02-03 PROCEDURE — 1126F PR PAIN SEVERITY QUANTIFIED, NO PAIN PRESENT: ICD-10-PCS | Mod: HCNC,CPTII,S$GLB, | Performed by: INTERNAL MEDICINE

## 2022-02-03 PROCEDURE — 3080F PR MOST RECENT DIASTOLIC BLOOD PRESSURE >= 90 MM HG: ICD-10-PCS | Mod: HCNC,CPTII,S$GLB, | Performed by: INTERNAL MEDICINE

## 2022-02-03 PROCEDURE — 3008F PR BODY MASS INDEX (BMI) DOCUMENTED: ICD-10-PCS | Mod: HCNC,CPTII,S$GLB, | Performed by: INTERNAL MEDICINE

## 2022-02-03 PROCEDURE — 1101F PT FALLS ASSESS-DOCD LE1/YR: CPT | Mod: HCNC,CPTII,S$GLB, | Performed by: INTERNAL MEDICINE

## 2022-02-03 PROCEDURE — 1126F AMNT PAIN NOTED NONE PRSNT: CPT | Mod: HCNC,CPTII,S$GLB, | Performed by: INTERNAL MEDICINE

## 2022-02-03 PROCEDURE — 99999 PR PBB SHADOW E&M-EST. PATIENT-LVL IV: CPT | Mod: PBBFAC,HCNC,, | Performed by: INTERNAL MEDICINE

## 2022-02-03 PROCEDURE — 99999 PR PBB SHADOW E&M-EST. PATIENT-LVL IV: ICD-10-PCS | Mod: PBBFAC,HCNC,, | Performed by: INTERNAL MEDICINE

## 2022-02-03 PROCEDURE — 3288F PR FALLS RISK ASSESSMENT DOCUMENTED: ICD-10-PCS | Mod: HCNC,CPTII,S$GLB, | Performed by: INTERNAL MEDICINE

## 2022-02-03 PROCEDURE — 3077F SYST BP >= 140 MM HG: CPT | Mod: HCNC,CPTII,S$GLB, | Performed by: INTERNAL MEDICINE

## 2022-02-03 PROCEDURE — 3044F HG A1C LEVEL LT 7.0%: CPT | Mod: HCNC,CPTII,S$GLB, | Performed by: INTERNAL MEDICINE

## 2022-02-03 NOTE — PROGRESS NOTES
Tommy Keith  was seen as a new patient to me at the request  Angelica Gomez MD for the evaluation of  pneumonia.    CHIEF COMPLAINT:  Pneumonia      HISTORY OF PRESENT ILLNESS: Tommy Keith is a 71 y.o. male  has a past medical history of Asthma, intermittent, History of BPH, History of hepatitis C, History of migraine headaches, History of multiple pulmonary nodules, Hyperlipidemia, Low serum testosterone level, Migraine headache, OA (osteoarthritis), Obesity, Sciatica of left side, and Spinal stenosis.  Patient was seen by Dr. Mackey for bronchiectasis.  Last appointment with her was 5/17/21.  At that time, patient was discharged from pulmonary clinic due to lack of symptoms and normal pft.   Patient was doing well and was discharged from pulmonary clinic.  Patient contracted covid 19 infection 12/21/21.  At that time, patient presented to ED but did not got admitted.  S/p monoclonal antibody infusion.  Patient was bed bound x 1 months.  Patient with poor appetite and strength.  Gradually getting stronger.  Currently back to baseline.  Occasional cough.  No wheezing.  Currently on combivent.  Active with house chores such as cleaning garage.  Patient is here for pulmonary follow up.       PAST MEDICAL HISTORY:    Active Ambulatory Problems     Diagnosis Date Noted    Sciatica of left side     Mixed hyperlipidemia     History of BPH     History of hepatitis C     Low serum testosterone level     Cervical spondylosis without myelopathy 11/05/2012    Degeneration of cervical intervertebral disc 11/05/2012    Acquired spondylolisthesis 11/05/2012    Displacement of lumbar intervertebral disc without myelopathy 11/05/2012    Degeneration of lumbar or lumbosacral intervertebral disc 11/05/2012    Thoracic or lumbosacral neuritis or radiculitis, unspecified 11/05/2012    Atherosclerosis of aorta 09/26/2016    Screen for colon cancer 04/20/2018    Bronchiectasis without complication 05/17/2021     Calcified granuloma of lung 05/17/2021     Resolved Ambulatory Problems     Diagnosis Date Noted    Chest pain 09/11/2016     Past Medical History:   Diagnosis Date    Asthma, intermittent     History of migraine headaches     History of multiple pulmonary nodules     Hyperlipidemia     Migraine headache     OA (osteoarthritis)     Obesity     Spinal stenosis                 PAST SURGICAL HISTORY:    Past Surgical History:   Procedure Laterality Date    arthroscopy of both knees      COLONOSCOPY N/A 4/20/2018    Procedure: COLONOSCOPY;  Surgeon: Jovon Nunez MD;  [facility];  Service: Endoscopy;  Laterality: N/A;    left inguinal hernia repair      LIVER BIOPSY      tonsillectomy      TONSILLECTOMY      UMBILICAL HERNIA REPAIR      VENTRAL HERNIA REPAIR           FAMILY HISTORY:                Family History   Problem Relation Age of Onset    Heart disease Father     Clotting disorder Brother     Diabetes Brother     Hyperlipidemia Brother     Cancer Brother     Diabetes Sister     Hyperlipidemia Sister        SOCIAL HISTORY:          Tobacco:   Social History     Tobacco Use   Smoking Status Former Smoker    Quit date: 8/16/2006    Years since quitting: 15.4   Smokeless Tobacco Never Used     alcohol use:    Social History     Substance and Sexual Activity   Alcohol Use No               Occupation:  Retire (former chemical storage plant x 20 years)    ALLERGIES:    Review of patient's allergies indicates:   Allergen Reactions    Levaquin [levofloxacin]        CURRENT MEDICATIONS:    Current Outpatient Medications   Medication Sig Dispense Refill    aspirin (ECOTRIN) 81 MG EC tablet Take 81 mg by mouth once daily.      atorvastatin (LIPITOR) 80 MG tablet TAKE 1 TABLET BY MOUTH EVERY EVENING 90 tablet 3    benzonatate (TESSALON) 100 MG capsule       fluticasone propionate (FLONASE) 50 mcg/actuation nasal spray INSTILL 2 SPRAYS INTO EACH NOSTRIL DAILY AS NEEDED 48 mL 0     "FLUZONE HIGHDOSE QUAD 21-22  mcg/0.7 mL Syrg       FOLIC ACID/MULTIVIT-MIN/LUTEIN (CENTRUM SILVER ORAL) Take by mouth.      glucosamine-chondroitin 500-400 mg tablet Take 1 tablet by mouth 3 (three) times daily.      ipratropium-albuteroL (COMBIVENT)  mcg/actuation inhaler Inhale 2 puffs into the lungs every 4 (four) hours as needed for Wheezing. Rescue 4 g 0    meloxicam (MOBIC) 15 MG tablet TAKE 1 TABLET BY MOUTH EVERY DAY 30 tablet 0    omega-3 fatty acids/fish oil (FISH OIL-OMEGA-3 FATTY ACIDS) 300-1,000 mg capsule Take 1 capsule by mouth once daily.      promethazine-dextromethorphan (PROMETHAZINE-DM) 6.25-15 mg/5 mL Syrp       sumatriptan (IMITREX STATDOSE) 6 mg/0.5 mL kit USE AS DIRECTED MAY REPEAT IN 1 HR MAX 2 DOSES PER 24 HOURS 1 mL 4    testosterone cypionate (DEPOTESTOTERONE CYPIONATE) 200 mg/mL injection Inject 200 mg into the muscle every 14 (fourteen) days.  3    topiramate (TOPAMAX) 25 MG tablet Take 3 tablets (75 mg total) by mouth every evening. 270 tablet 1     No current facility-administered medications for this visit.                  REVIEW OF SYSTEMS:     Pulmonary related symptoms as per HPI.  Gen:  no weight loss, no fever, no night sweat  HEENT:  no visual changes, no sore throat, no hearing loss  CV:  No chest pain, no orthopnea, no PND  GI:  no melena, + hematochezia a/w hemorrhoids, no diarhea, no constipation.  :  no dysuria, no hematuria, no hesistancy, no dribbling  Neuro:  no syncope, no vertigo, no tinitus  Psych:  No homocide or suicide ideation; no depression.  Endocrine:  No heat or cold intolerance.  Sleep:  + snoring; no witnessed apnea.  Feeling rested upon awake.    Otherwise, a balance of systems reviewed is negative.          PHYSICAL EXAM:  Vitals:    02/03/22 0811   BP: (!) 159/102   Pulse: 76   SpO2: 96%   Weight: 94 kg (207 lb 2.3 oz)   Height: 5' 7" (1.702 m)   PainSc: 0-No pain     Body mass index is 32.44 kg/m².     GENERAL:  well develop; no " apparent distress  HEENT:  no nasal congestion; no discharge noted; class 2 modified mallampatti.   NECK:  supple; no palpable masses.  CARDIO: regular rate and rhythm  PULM:  clear to auscultation bilaterally; no intercostals retractions; no accessory muscle usage   ABDOMEN:  soft nontender/nondistended.  +bowel sound  EXTREMITIES no cce  NEURO:  CN II-XII intact.  5/5 motor in all extremities.  sensation grossly intact   to light touch.  PSYCH:  normal affect.  Alert and oriented x 4    LABS  Pulmonary Functions Testing Results(personally reviewed):    PFT 5/30/13 Ratio of 82%; FVC 3.53 L (93%); FEV1 2.9 L (95%);  dlco 22 (85%)   VBG (personally reviewed):  1/29/18 7.41/43/17/27  CXR (personally reviewed):  1/11/22 increased reticular markings bilaterally.  More pronounced when compared to 5/17/21 cxr  CT CHEST(personally reviewed):  7/1/13 minimal basilar tubular bronchiectasis.      ASSESSMENT/PLAN  Problem List Items Addressed This Visit     Bronchiectasis without complication - Primary    Overview     -mild basilar bronchiectasis.  Clinically asymptomatic until to covid infection.  Now fully vaccinated.    -recent symptoms may relate to covid infection.  Most recent cxr with increase reticular markings but not overt consolidation.  This can be c/w recent covid infection. Ok to resume exercise.  -will obtain repeat pft for comparison  -continue with combivent.         Relevant Orders    Complete PFT with bronchodilator    COVID-19 Routine Screening      Other Visit Diagnoses     Interstitial pneumonia                Patient will No follow-ups on file. with md.    CC: Send copy of this note to Angelica Gomez MD

## 2022-02-04 RX ORDER — ATORVASTATIN CALCIUM 80 MG/1
TABLET, FILM COATED ORAL
Qty: 90 TABLET | Refills: 0 | OUTPATIENT
Start: 2022-02-04

## 2022-02-05 NOTE — TELEPHONE ENCOUNTER
[facility] Note  Quick DC. Inappropriate Request   Refill request requires further review by MD: NO   Medication Therapy Plan: Pharmacy is requesting new script(s) for the following medications without required information, (sig/ frequency/qty/etc)     [facility] action(s):  Quick Discontinue      Duplicate Pended Encounter(s)/ Last Prescribed Details:    Pharmacies have been requesting medications for patients without required information, (sig, frequency, qty, etc.). In addition, requests are sent for medication(s) pt. are currently not taking, and medications patients have never taken.    We have spoken to the pharmacies about these request types and advised their teams previously that we are unable to assess these New Script requests and require all details for these requests. This is a known issue and has been reported.        Medication related problems are not assessed for [facility].   Medication Reconciliation Completed? NO Were there pending details that required adjustment? NO     Automatic Epic Generated Protocol Data Below:   Requested Prescriptions   Pending Prescriptions Disp Refills    atorvastatin (LIPITOR) 80 MG tablet [Pharmacy Med Name: ATORVASTATIN 80MG TAB] 90 tablet 0              Appointments      Date Provider   Last Visit   1/11/2022 Angelica Gomez MD   Next Visit   1/18/2022 Angelica Gomez MD        Note composed:10:15 PM 02/04/2022

## 2022-02-06 DIAGNOSIS — J84.9 INTERSTITIAL PNEUMONIA: ICD-10-CM

## 2022-02-06 NOTE — TELEPHONE ENCOUNTER
No new care gaps identified.  Powered by [employer] by [employer]. Reference number: [id number].   2/06/2022 7:21:12 AM CST

## 2022-02-07 ENCOUNTER — LAB VISIT (OUTPATIENT)
Dept: FAMILY MEDICINE | Facility: CLINIC | Age: 72
End: 2022-02-07
Payer: MEDICARE

## 2022-02-07 DIAGNOSIS — J47.9 BRONCHIECTASIS WITHOUT COMPLICATION: ICD-10-CM

## 2022-02-07 PROCEDURE — U0003 INFECTIOUS AGENT DETECTION BY NUCLEIC ACID (DNA OR RNA); SEVERE ACUTE RESPIRATORY SYNDROME CORONAVIRUS 2 (SARS-COV-2) (CORONAVIRUS DISEASE [COVID-19]), AMPLIFIED PROBE TECHNIQUE, MAKING USE OF HIGH THROUGHPUT TECHNOLOGIES AS DESCRIBED BY CMS-2020-01-R: HCPCS | Performed by: INTERNAL MEDICINE

## 2022-02-07 PROCEDURE — U0005 INFEC AGEN DETEC AMPLI PROBE: HCPCS | Performed by: INTERNAL MEDICINE

## 2022-02-07 RX ORDER — IPRATROPIUM BROMIDE AND ALBUTEROL 20; 100 UG/1; UG/1
SPRAY, METERED RESPIRATORY (INHALATION)
Qty: 4 G | Refills: 3 | Status: SHIPPED | OUTPATIENT
Start: 2022-02-07 | End: 2024-03-05 | Stop reason: SDUPTHER

## 2022-02-08 LAB
SARS-COV-2 RNA RESP QL NAA+PROBE: NOT DETECTED
SARS-COV-2- CYCLE NUMBER: NORMAL

## 2022-02-09 ENCOUNTER — HOSPITAL ENCOUNTER (OUTPATIENT)
Dept: RESPIRATORY THERAPY | Facility: HOSPITAL | Age: 72
Discharge: HOME | End: 2022-02-09
Attending: INTERNAL MEDICINE
Payer: MEDICARE

## 2022-02-09 DIAGNOSIS — J47.9 BRONCHIECTASIS WITHOUT COMPLICATION: ICD-10-CM

## 2022-02-09 LAB
BRPFT: NORMAL
DLCO ADJ PRE: 19.2 ML/(MIN*MMHG)
DLCO SINGLE BREATH LLN: 17.87
DLCO SINGLE BREATH PRE REF: 82.2 %
DLCO SINGLE BREATH REF: 24.79
DLCOC SBVA LLN: 2.55
DLCOC SBVA PRE REF: 103.4 %
DLCOC SBVA REF: 3.81
DLCOC SINGLE BREATH LLN: 17.87
DLCOC SINGLE BREATH PRE REF: 77.5 %
DLCOC SINGLE BREATH REF: 24.79
DLCOVA LLN: 2.55
DLCOVA PRE REF: 109.7 %
DLCOVA PRE: 4.18 ML/(MIN*MMHG*L)
DLCOVA REF: 3.81
DLVAADJ PRE: 3.94 ML/(MIN*MMHG*L)
ERVN2 LLN: -16449.01
ERVN2 PRE REF: 18.1 %
ERVN2 PRE: 0.18 L
ERVN2 REF: 0.99
FEF 25 75 LLN: 1
FEF 25 75 PRE REF: 135.4 %
FEF 25 75 REF: 2.29
FEV1 FVC LLN: 63
FEV1 FVC PRE REF: 102.9 %
FEV1 FVC REF: 77
FEV1 LLN: 2.12
FEV1 PRE REF: 95.2 %
FEV1 REF: 2.92
FRCN2 LLN: 2.52
FRCN2 PRE REF: 43.6 %
FRCN2 REF: 3.51
FVC LLN: 2.84
FVC PRE REF: 92.3 %
FVC REF: 3.83
IVC PRE: 3.37 L
IVC SINGLE BREATH LLN: 2.84
IVC SINGLE BREATH PRE REF: 88 %
IVC SINGLE BREATH REF: 3.83
PEF LLN: 5.66
PEF PRE REF: 96.2 %
PEF REF: 7.78
PRE DLCO: 20.39 ML/(MIN*MMHG)
PRE FEF 25 75: 3.1 L/S
PRE FET 100: 7.03 SEC
PRE FEV1 FVC: 78.81 %
PRE FEV1: 2.79 L
PRE FRC N2: 1.53 L
PRE FVC: 3.53 L
PRE PEF: 7.48 L/S
RVN2 LLN: 1.84
RVN2 PRE REF: 53.7 %
RVN2 PRE: 1.35 L
RVN2 REF: 2.52
RVN2TLCN2 LLN: 31.89
RVN2TLCN2 PRE REF: 69.1 %
RVN2TLCN2 PRE: 28.24 %
RVN2TLCN2 REF: 40.87
TLCN2 LLN: 5.35
TLCN2 PRE REF: 73.5 %
TLCN2 PRE: 4.78 L
TLCN2 REF: 6.5
VA PRE: 4.88 L
VA SINGLE BREATH LLN: 6.35
VA SINGLE BREATH PRE REF: 76.7 %
VA SINGLE BREATH REF: 6.35
VCMAXN2 LLN: 2.84
VCMAXN2 PRE REF: 89.6 %
VCMAXN2 PRE: 3.43 L
VCMAXN2 REF: 3.83

## 2022-02-09 PROCEDURE — 94729 DIFFUSING CAPACITY: CPT | Mod: 26,,, | Performed by: INTERNAL MEDICINE

## 2022-02-09 PROCEDURE — 94729 DIFFUSING CAPACITY: CPT

## 2022-02-09 PROCEDURE — 94727 PR PULM FUNCTION TEST BY GAS: ICD-10-PCS | Mod: 26,,, | Performed by: INTERNAL MEDICINE

## 2022-02-09 PROCEDURE — 94010 BREATHING CAPACITY TEST: ICD-10-PCS | Mod: 26,,, | Performed by: INTERNAL MEDICINE

## 2022-02-09 PROCEDURE — 94010 BREATHING CAPACITY TEST: CPT | Mod: 26,,, | Performed by: INTERNAL MEDICINE

## 2022-02-09 PROCEDURE — 94010 BREATHING CAPACITY TEST: CPT

## 2022-02-09 PROCEDURE — 94727 GAS DIL/WSHOT DETER LNG VOL: CPT

## 2022-02-09 PROCEDURE — 94727 GAS DIL/WSHOT DETER LNG VOL: CPT | Mod: 26,,, | Performed by: INTERNAL MEDICINE

## 2022-02-09 PROCEDURE — 94729 PR C02/MEMBANE DIFFUSE CAPACITY: ICD-10-PCS | Mod: 26,,, | Performed by: INTERNAL MEDICINE

## 2022-03-11 DIAGNOSIS — G43.909 MIGRAINE WITHOUT STATUS MIGRAINOSUS, NOT INTRACTABLE, UNSPECIFIED MIGRAINE TYPE: ICD-10-CM

## 2022-03-11 RX ORDER — TESTOSTERONE CYPIONATE 200 MG/ML
200 INJECTION, SOLUTION INTRAMUSCULAR
Refills: 3 | Status: CANCELLED | OUTPATIENT
Start: 2022-03-11

## 2022-03-11 RX ORDER — CEFUROXIME AXETIL 250 MG/1
TABLET ORAL
Qty: 1 ML | Refills: 4 | Status: SHIPPED | OUTPATIENT
Start: 2022-03-11 | End: 2022-07-14 | Stop reason: SDUPTHER

## 2022-03-11 NOTE — TELEPHONE ENCOUNTER
No new care gaps identified.  Powered by [employer]. Reference number: [id number].   3/11/2022 1:31:07 PM CST

## 2022-03-11 NOTE — TELEPHONE ENCOUNTER
Testosterone injection was pulled up by staff.   It was written by Dr. Montesinos.   I did prescribe him sumitriptan injection   Does he mean that?

## 2022-03-11 NOTE — TELEPHONE ENCOUNTER
----- Message from Samantha Valleer sent at 3/11/2022 11:58 AM CST -----  Contact: NITISH PATEL [[id number]]  Type: Call Back    Who called: NITISH PATEL [[id number]]    What is the request in detail: Patient is requesting a call back. He states that he would like his injection (he do not know the name) sent over to his local pharmacy. Please advise.     Can the clinic reply by [facility]? No    Would the patient rather a call back or a response via [facility]? Call back     Best call back number: [phone] (mobile)    Additional Information:   [id number] - [place] - [address]  [address] [place] [address]  Phone: [phone] Fax: [phone]

## 2022-07-14 ENCOUNTER — OFFICE VISIT (OUTPATIENT)
Dept: FAMILY MEDICINE | Facility: CLINIC | Age: 72
End: 2022-07-14
Payer: MEDICARE

## 2022-07-14 VITALS
HEART RATE: 80 BPM | WEIGHT: 215.63 LBS | DIASTOLIC BLOOD PRESSURE: 82 MMHG | OXYGEN SATURATION: 96 % | TEMPERATURE: 98 F | BODY MASS INDEX: 33.84 KG/M2 | SYSTOLIC BLOOD PRESSURE: 124 MMHG | HEIGHT: 67 IN

## 2022-07-14 DIAGNOSIS — G43.909 MIGRAINE WITHOUT STATUS MIGRAINOSUS, NOT INTRACTABLE, UNSPECIFIED MIGRAINE TYPE: ICD-10-CM

## 2022-07-14 DIAGNOSIS — J84.10 CALCIFIED GRANULOMA OF LUNG: ICD-10-CM

## 2022-07-14 DIAGNOSIS — R73.03 PREDIABETES: Primary | ICD-10-CM

## 2022-07-14 DIAGNOSIS — I70.0 ATHEROSCLEROSIS OF AORTA: ICD-10-CM

## 2022-07-14 DIAGNOSIS — Z00.00 ANNUAL PHYSICAL EXAM: ICD-10-CM

## 2022-07-14 DIAGNOSIS — E78.5 HYPERLIPIDEMIA, UNSPECIFIED HYPERLIPIDEMIA TYPE: ICD-10-CM

## 2022-07-14 PROCEDURE — 3044F PR MOST RECENT HEMOGLOBIN A1C LEVEL <7.0%: ICD-10-PCS | Mod: CPTII,S$GLB,, | Performed by: FAMILY MEDICINE

## 2022-07-14 PROCEDURE — 1159F MED LIST DOCD IN RCRD: CPT | Mod: CPTII,S$GLB,, | Performed by: FAMILY MEDICINE

## 2022-07-14 PROCEDURE — 99999 PR PBB SHADOW E&M-EST. PATIENT-LVL IV: CPT | Mod: PBBFAC,,, | Performed by: FAMILY MEDICINE

## 2022-07-14 PROCEDURE — 3074F SYST BP LT 130 MM HG: CPT | Mod: CPTII,S$GLB,, | Performed by: FAMILY MEDICINE

## 2022-07-14 PROCEDURE — 1160F RVW MEDS BY RX/DR IN RCRD: CPT | Mod: CPTII,S$GLB,, | Performed by: FAMILY MEDICINE

## 2022-07-14 PROCEDURE — 1159F PR MEDICATION LIST DOCUMENTED IN MEDICAL RECORD: ICD-10-PCS | Mod: CPTII,S$GLB,, | Performed by: FAMILY MEDICINE

## 2022-07-14 PROCEDURE — 99397 PR PREVENTIVE VISIT,EST,65 & OVER: ICD-10-PCS | Mod: S$GLB,,, | Performed by: FAMILY MEDICINE

## 2022-07-14 PROCEDURE — 99397 PER PM REEVAL EST PAT 65+ YR: CPT | Mod: S$GLB,,, | Performed by: FAMILY MEDICINE

## 2022-07-14 PROCEDURE — 3074F PR MOST RECENT SYSTOLIC BLOOD PRESSURE < 130 MM HG: ICD-10-PCS | Mod: CPTII,S$GLB,, | Performed by: FAMILY MEDICINE

## 2022-07-14 PROCEDURE — 3079F DIAST BP 80-89 MM HG: CPT | Mod: CPTII,S$GLB,, | Performed by: FAMILY MEDICINE

## 2022-07-14 PROCEDURE — 3008F BODY MASS INDEX DOCD: CPT | Mod: CPTII,S$GLB,, | Performed by: FAMILY MEDICINE

## 2022-07-14 PROCEDURE — 3044F HG A1C LEVEL LT 7.0%: CPT | Mod: CPTII,S$GLB,, | Performed by: FAMILY MEDICINE

## 2022-07-14 PROCEDURE — 3008F PR BODY MASS INDEX (BMI) DOCUMENTED: ICD-10-PCS | Mod: CPTII,S$GLB,, | Performed by: FAMILY MEDICINE

## 2022-07-14 PROCEDURE — 1160F PR REVIEW ALL MEDS BY PRESCRIBER/CLIN PHARMACIST DOCUMENTED: ICD-10-PCS | Mod: CPTII,S$GLB,, | Performed by: FAMILY MEDICINE

## 2022-07-14 PROCEDURE — 1126F PR PAIN SEVERITY QUANTIFIED, NO PAIN PRESENT: ICD-10-PCS | Mod: CPTII,S$GLB,, | Performed by: FAMILY MEDICINE

## 2022-07-14 PROCEDURE — 1126F AMNT PAIN NOTED NONE PRSNT: CPT | Mod: CPTII,S$GLB,, | Performed by: FAMILY MEDICINE

## 2022-07-14 PROCEDURE — 3079F PR MOST RECENT DIASTOLIC BLOOD PRESSURE 80-89 MM HG: ICD-10-PCS | Mod: CPTII,S$GLB,, | Performed by: FAMILY MEDICINE

## 2022-07-14 PROCEDURE — 99999 PR PBB SHADOW E&M-EST. PATIENT-LVL IV: ICD-10-PCS | Mod: PBBFAC,,, | Performed by: FAMILY MEDICINE

## 2022-07-14 RX ORDER — VITAMIN E (DL,TOCOPHERYL ACET) 22.5 MG/ML
DROPS ORAL
COMMUNITY

## 2022-07-14 RX ORDER — TOPIRAMATE 25 MG/1
75 TABLET ORAL
COMMUNITY
Start: 2022-04-29

## 2022-07-14 RX ORDER — CEFUROXIME AXETIL 250 MG/1
TABLET ORAL
Qty: 1 ML | Refills: 6 | Status: SHIPPED | OUTPATIENT
Start: 2022-07-14 | End: 2023-10-27

## 2022-07-14 RX ORDER — PNV NO.95/FERROUS FUM/FOLIC AC 28MG-0.8MG
TABLET ORAL
COMMUNITY

## 2022-07-14 NOTE — PROGRESS NOTES
"Routine Office Visit    Tommy Keith  1950  [id number]      Subjective     Tommy is a 72 y.o. male who presents today for:    1. Annual physical - Patient has been doing well. He works out 6 days per week. He has a goal to be less than 200#. That is he feels best. He has been taking work up supplements.   2. Migraine headaches - Patient is doing well on current regimen. He has no side effects from current regimen.     Objective     Review of Systems   Constitutional: Negative for chills and fever.   HENT: Negative for congestion.    Eyes: Negative for blurred vision.   Respiratory: Negative for cough.    Cardiovascular: Negative for chest pain.   Gastrointestinal: Negative for abdominal pain, constipation, diarrhea, heartburn, nausea and vomiting.   Genitourinary: Negative for dysuria.   Musculoskeletal: Negative for myalgias.   Skin: Negative for itching and rash.   Neurological: Negative for dizziness and headaches.   Psychiatric/Behavioral: Negative for depression.       /82 (BP Location: Right arm, Patient Position: Sitting, BP Method: Large (Manual))   Pulse 80   Temp 98.1 °F (36.7 °C) (Oral)   Ht 5' 7" (1.702 m)   Wt 97.8 kg (215 lb 9.8 oz)   SpO2 96%   BMI 33.77 kg/m²   Physical Exam  Constitutional:       Appearance: He is well-developed.   HENT:      Head: Normocephalic and atraumatic.   Eyes:      Conjunctiva/sclera: Conjunctivae normal.      Pupils: Pupils are equal, round, and reactive to light.   Neck:      Thyroid: No thyromegaly.      Vascular: No JVD.   Cardiovascular:      Rate and Rhythm: Normal rate and regular rhythm.      Heart sounds: Normal heart sounds.   Pulmonary:      Effort: Pulmonary effort is normal.      Breath sounds: Normal breath sounds. No wheezing.   Abdominal:      General: Bowel sounds are normal. There is no distension.      Palpations: Abdomen is soft.      Tenderness: There is no abdominal tenderness. There is no guarding.   Musculoskeletal:         " General: Normal range of motion.      Cervical back: Normal range of motion and neck supple.   Lymphadenopathy:      Cervical: No cervical adenopathy.   Skin:     General: Skin is warm and dry.   Neurological:      Mental Status: He is alert and oriented to person, place, and time.   Psychiatric:         Behavior: Behavior normal.           Assessment     Problem List Items Addressed This Visit        Pulmonary    Calcified granuloma of lung    Overview     Benign, requires no further work up.              Cardiac/Vascular    Atherosclerosis of aorta    Overview     US Abdominal AortaPerformed 1/16/2018  Patient with Atherosclerosis of the Aorta.  Stable/asymptomatic. Currently stable on lipid lowering treatment and b/p monitoring.               Other Visit Diagnoses     Prediabetes    -  Primary    Relevant Orders    CBC Auto Differential    Comprehensive Metabolic Panel    Lipid Panel    Hemoglobin A1C    TSH    Hyperlipidemia, unspecified hyperlipidemia type        Relevant Orders    CBC Auto Differential    Comprehensive Metabolic Panel    Lipid Panel    Hemoglobin A1C    TSH    Migraine without status migrainosus, not intractable, unspecified migraine type        Relevant Medications    topiramate (TOPAMAX) 25 MG tablet    sumatriptan (IMITREX STATDOSE) 6 mg/0.5 mL kit      Annual physical  Health Maintenance       Date Due Completion Date    Pneumococcal Vaccines (Age 65+) (3 - PPSV23 or PCV20) 10/23/2020 1/5/2018    COVID-19 Vaccine (3 - Booster for Pfizer series) 01/20/2022 8/20/2021    Influenza Vaccine (1) 09/01/2022 12/9/2021    Colorectal Cancer Screening 04/20/2023 4/20/2018    High Dose Statin 07/14/2023 7/14/2022    Lipid Panel 06/29/2026 6/29/2021    TETANUS VACCINE 06/29/2031 6/29/2021        I addressed all major concerns as it related to health maintenance.  All were ordered and scheduled based on the patients wishes.  Any additional health maintenance will be readdressed at the next physical if  declined or deferred by the patient.      Follow up in about 1 year (around 7/14/2023), or if symptoms worsen or fail to improve.

## 2022-09-16 ENCOUNTER — PES CALL (OUTPATIENT)
Dept: ADMINISTRATIVE | Facility: CLINIC | Age: 72
End: 2022-09-16
Payer: MEDICARE

## 2022-09-30 ENCOUNTER — PES CALL (OUTPATIENT)
Dept: ADMINISTRATIVE | Facility: CLINIC | Age: 72
End: 2022-09-30
Payer: MEDICARE

## 2022-10-11 ENCOUNTER — TELEPHONE (OUTPATIENT)
Dept: ADMINISTRATIVE | Facility: CLINIC | Age: 72
End: 2022-10-11
Payer: MEDICARE

## 2022-10-11 NOTE — TELEPHONE ENCOUNTER
Called pt, no answer; left message informing pt I was calling to remind pt of his in office EAWV on 10/13/22 and to return my call if he had any questions; left my name and number

## 2022-10-13 ENCOUNTER — LAB VISIT (OUTPATIENT)
Dept: LAB | Facility: HOSPITAL | Age: 72
End: 2022-10-13
Attending: FAMILY MEDICINE
Payer: MEDICARE

## 2022-10-13 ENCOUNTER — OFFICE VISIT (OUTPATIENT)
Dept: FAMILY MEDICINE | Facility: CLINIC | Age: 72
End: 2022-10-13
Payer: MEDICARE

## 2022-10-13 VITALS
WEIGHT: 199.06 LBS | BODY MASS INDEX: 31.24 KG/M2 | HEART RATE: 74 BPM | OXYGEN SATURATION: 97 % | HEIGHT: 67 IN | SYSTOLIC BLOOD PRESSURE: 126 MMHG | TEMPERATURE: 98 F | RESPIRATION RATE: 18 BRPM | DIASTOLIC BLOOD PRESSURE: 80 MMHG

## 2022-10-13 DIAGNOSIS — R79.89 LOW SERUM TESTOSTERONE LEVEL: ICD-10-CM

## 2022-10-13 DIAGNOSIS — J84.10 CALCIFIED GRANULOMA OF LUNG: ICD-10-CM

## 2022-10-13 DIAGNOSIS — E78.5 HYPERLIPIDEMIA, UNSPECIFIED HYPERLIPIDEMIA TYPE: ICD-10-CM

## 2022-10-13 DIAGNOSIS — R73.03 PREDIABETES: ICD-10-CM

## 2022-10-13 DIAGNOSIS — E78.2 MIXED HYPERLIPIDEMIA: ICD-10-CM

## 2022-10-13 DIAGNOSIS — J47.9 BRONCHIECTASIS WITHOUT COMPLICATION: ICD-10-CM

## 2022-10-13 DIAGNOSIS — Z00.00 ENCOUNTER FOR PREVENTIVE HEALTH EXAMINATION: Primary | ICD-10-CM

## 2022-10-13 DIAGNOSIS — Z23 INFLUENZA VACCINE NEEDED: ICD-10-CM

## 2022-10-13 DIAGNOSIS — I70.0 ATHEROSCLEROSIS OF AORTA: ICD-10-CM

## 2022-10-13 LAB
ALBUMIN SERPL BCP-MCNC: 4.2 G/DL (ref 3.5–5.2)
ALP SERPL-CCNC: 73 U/L (ref 55–135)
ALT SERPL W/O P-5'-P-CCNC: 41 U/L (ref 10–44)
ANION GAP SERPL CALC-SCNC: 8 MMOL/L (ref 8–16)
AST SERPL-CCNC: 41 U/L (ref 10–40)
BASOPHILS # BLD AUTO: 0.03 K/UL (ref 0–0.2)
BASOPHILS NFR BLD: 0.6 % (ref 0–1.9)
BILIRUB SERPL-MCNC: 1.1 MG/DL (ref 0.1–1)
BUN SERPL-MCNC: 23 MG/DL (ref 8–23)
CALCIUM SERPL-MCNC: 9.7 MG/DL (ref 8.7–10.5)
CHLORIDE SERPL-SCNC: 106 MMOL/L (ref 95–110)
CHOLEST SERPL-MCNC: 183 MG/DL (ref 120–199)
CHOLEST/HDLC SERPL: 5.5 {RATIO} (ref 2–5)
CO2 SERPL-SCNC: 22 MMOL/L (ref 23–29)
CREAT SERPL-MCNC: 1.1 MG/DL (ref 0.5–1.4)
DIFFERENTIAL METHOD: ABNORMAL
EOSINOPHIL # BLD AUTO: 0.1 K/UL (ref 0–0.5)
EOSINOPHIL NFR BLD: 1.1 % (ref 0–8)
ERYTHROCYTE [DISTWIDTH] IN BLOOD BY AUTOMATED COUNT: 13 % (ref 11.5–14.5)
EST. GFR  (NO RACE VARIABLE): >60 ML/MIN/1.73 M^2
ESTIMATED AVG GLUCOSE: 105 MG/DL (ref 68–131)
GLUCOSE SERPL-MCNC: 96 MG/DL (ref 70–110)
HBA1C MFR BLD: 5.3 % (ref 4–5.6)
HCT VFR BLD AUTO: 48.7 % (ref 40–54)
HDLC SERPL-MCNC: 33 MG/DL (ref 40–75)
HDLC SERPL: 18 % (ref 20–50)
HGB BLD-MCNC: 16 G/DL (ref 14–18)
IMM GRANULOCYTES # BLD AUTO: 0.02 K/UL (ref 0–0.04)
IMM GRANULOCYTES NFR BLD AUTO: 0.4 % (ref 0–0.5)
LDLC SERPL CALC-MCNC: 137.8 MG/DL (ref 63–159)
LYMPHOCYTES # BLD AUTO: 0.9 K/UL (ref 1–4.8)
LYMPHOCYTES NFR BLD: 15.8 % (ref 18–48)
MCH RBC QN AUTO: 31.5 PG (ref 27–31)
MCHC RBC AUTO-ENTMCNC: 32.9 G/DL (ref 32–36)
MCV RBC AUTO: 96 FL (ref 82–98)
MONOCYTES # BLD AUTO: 0.9 K/UL (ref 0.3–1)
MONOCYTES NFR BLD: 15.8 % (ref 4–15)
NEUTROPHILS # BLD AUTO: 3.6 K/UL (ref 1.8–7.7)
NEUTROPHILS NFR BLD: 66.3 % (ref 38–73)
NONHDLC SERPL-MCNC: 150 MG/DL
NRBC BLD-RTO: 0 /100 WBC
PLATELET # BLD AUTO: 236 K/UL (ref 150–450)
PMV BLD AUTO: 10.5 FL (ref 9.2–12.9)
POTASSIUM SERPL-SCNC: 4.6 MMOL/L (ref 3.5–5.1)
PROT SERPL-MCNC: 7.3 G/DL (ref 6–8.4)
RBC # BLD AUTO: 5.08 M/UL (ref 4.6–6.2)
SODIUM SERPL-SCNC: 136 MMOL/L (ref 136–145)
TRIGL SERPL-MCNC: 61 MG/DL (ref 30–150)
TSH SERPL DL<=0.005 MIU/L-ACNC: 0.69 UIU/ML (ref 0.4–4)
WBC # BLD AUTO: 5.37 K/UL (ref 3.9–12.7)

## 2022-10-13 PROCEDURE — G0008 FLU VACCINE - QUADRIVALENT - ADJUVANTED: ICD-10-PCS | Mod: S$GLB,,, | Performed by: NURSE PRACTITIONER

## 2022-10-13 PROCEDURE — 1101F PT FALLS ASSESS-DOCD LE1/YR: CPT | Mod: CPTII,S$GLB,, | Performed by: NURSE PRACTITIONER

## 2022-10-13 PROCEDURE — 1160F RVW MEDS BY RX/DR IN RCRD: CPT | Mod: CPTII,S$GLB,, | Performed by: NURSE PRACTITIONER

## 2022-10-13 PROCEDURE — G0439 PR MEDICARE ANNUAL WELLNESS SUBSEQUENT VISIT: ICD-10-PCS | Mod: S$GLB,,, | Performed by: NURSE PRACTITIONER

## 2022-10-13 PROCEDURE — 1101F PR PT FALLS ASSESS DOC 0-1 FALLS W/OUT INJ PAST YR: ICD-10-PCS | Mod: CPTII,S$GLB,, | Performed by: NURSE PRACTITIONER

## 2022-10-13 PROCEDURE — 3044F HG A1C LEVEL LT 7.0%: CPT | Mod: CPTII,S$GLB,, | Performed by: NURSE PRACTITIONER

## 2022-10-13 PROCEDURE — 3288F FALL RISK ASSESSMENT DOCD: CPT | Mod: CPTII,S$GLB,, | Performed by: NURSE PRACTITIONER

## 2022-10-13 PROCEDURE — G0008 ADMIN INFLUENZA VIRUS VAC: HCPCS | Mod: S$GLB,,, | Performed by: NURSE PRACTITIONER

## 2022-10-13 PROCEDURE — 99999 PR PBB SHADOW E&M-EST. PATIENT-LVL V: ICD-10-PCS | Mod: PBBFAC,,, | Performed by: NURSE PRACTITIONER

## 2022-10-13 PROCEDURE — 1159F PR MEDICATION LIST DOCUMENTED IN MEDICAL RECORD: ICD-10-PCS | Mod: CPTII,S$GLB,, | Performed by: NURSE PRACTITIONER

## 2022-10-13 PROCEDURE — 90694 VACC AIIV4 NO PRSRV 0.5ML IM: CPT | Mod: S$GLB,,, | Performed by: NURSE PRACTITIONER

## 2022-10-13 PROCEDURE — 85025 COMPLETE CBC W/AUTO DIFF WBC: CPT | Performed by: FAMILY MEDICINE

## 2022-10-13 PROCEDURE — 3288F PR FALLS RISK ASSESSMENT DOCUMENTED: ICD-10-PCS | Mod: CPTII,S$GLB,, | Performed by: NURSE PRACTITIONER

## 2022-10-13 PROCEDURE — G0439 PPPS, SUBSEQ VISIT: HCPCS | Mod: S$GLB,,, | Performed by: NURSE PRACTITIONER

## 2022-10-13 PROCEDURE — 36415 COLL VENOUS BLD VENIPUNCTURE: CPT | Mod: PO | Performed by: FAMILY MEDICINE

## 2022-10-13 PROCEDURE — 1170F PR FUNCTIONAL STATUS ASSESSED: ICD-10-PCS | Mod: CPTII,S$GLB,, | Performed by: NURSE PRACTITIONER

## 2022-10-13 PROCEDURE — 3044F PR MOST RECENT HEMOGLOBIN A1C LEVEL <7.0%: ICD-10-PCS | Mod: CPTII,S$GLB,, | Performed by: NURSE PRACTITIONER

## 2022-10-13 PROCEDURE — 1160F PR REVIEW ALL MEDS BY PRESCRIBER/CLIN PHARMACIST DOCUMENTED: ICD-10-PCS | Mod: CPTII,S$GLB,, | Performed by: NURSE PRACTITIONER

## 2022-10-13 PROCEDURE — 80061 LIPID PANEL: CPT | Performed by: FAMILY MEDICINE

## 2022-10-13 PROCEDURE — 1126F AMNT PAIN NOTED NONE PRSNT: CPT | Mod: CPTII,S$GLB,, | Performed by: NURSE PRACTITIONER

## 2022-10-13 PROCEDURE — 1170F FXNL STATUS ASSESSED: CPT | Mod: CPTII,S$GLB,, | Performed by: NURSE PRACTITIONER

## 2022-10-13 PROCEDURE — 90694 FLU VACCINE - QUADRIVALENT - ADJUVANTED: ICD-10-PCS | Mod: S$GLB,,, | Performed by: NURSE PRACTITIONER

## 2022-10-13 PROCEDURE — 84443 ASSAY THYROID STIM HORMONE: CPT | Performed by: FAMILY MEDICINE

## 2022-10-13 PROCEDURE — 99999 PR PBB SHADOW E&M-EST. PATIENT-LVL V: CPT | Mod: PBBFAC,,, | Performed by: NURSE PRACTITIONER

## 2022-10-13 PROCEDURE — 1159F MED LIST DOCD IN RCRD: CPT | Mod: CPTII,S$GLB,, | Performed by: NURSE PRACTITIONER

## 2022-10-13 PROCEDURE — 83036 HEMOGLOBIN GLYCOSYLATED A1C: CPT | Performed by: FAMILY MEDICINE

## 2022-10-13 PROCEDURE — 1126F PR PAIN SEVERITY QUANTIFIED, NO PAIN PRESENT: ICD-10-PCS | Mod: CPTII,S$GLB,, | Performed by: NURSE PRACTITIONER

## 2022-10-13 PROCEDURE — 80053 COMPREHEN METABOLIC PANEL: CPT | Performed by: FAMILY MEDICINE

## 2022-10-13 NOTE — PROGRESS NOTES
"    I offered to discuss advanced care planning, including how to pick a person who would make decisions for you if you were unable to make them for yourself, called a health care power of , and what kind of decisions you might make such as use of life sustaining treatments such as ventilators and tube feeding when faced with a life limiting illness recorded on a living will that they will need to know. (How you want to be cared for as you near the end of your natural life)     X Patient is interested in learning more about how to make advanced directives.  I provided them paperwork and offered to discuss this with them.  Tommy Keith presented for a  Medicare AWV and comprehensive Health Risk Assessment today. The following components were reviewed and updated:    Medical history  Family History  Social history  Allergies and Current Medications  Health Risk Assessment  Health Maintenance  Care Team       ** See Completed Assessments for Annual Wellness Visit within the encounter summary.**       The following assessments were completed:  Living Situation  CAGE  Depression Screening  Timed Get Up and Go  Whisper Test  Cognitive Function Screening  Nutrition Screening  ADL Screening  PAQ Screening          Vitals:    10/13/22 0847   BP: 126/80   Pulse: 74   Resp: 18   Temp: 97.6 °F (36.4 °C)   TempSrc: Oral   SpO2: 97%   Weight: 90.3 kg (199 lb 1.2 oz)   Height: 5' 7" (1.702 m)     Body mass index is 31.18 kg/m².  Physical Exam  Vitals and nursing note reviewed.   Constitutional:       Appearance: Normal appearance.   Cardiovascular:      Rate and Rhythm: Normal rate.      Pulses: Normal pulses.      Heart sounds: Normal heart sounds.   Pulmonary:      Effort: Pulmonary effort is normal.      Breath sounds: Normal breath sounds.   Abdominal:      General: Bowel sounds are normal.      Palpations: Abdomen is soft.   Musculoskeletal:         General: Normal range of motion.   Neurological:      Mental " Status: He is alert and oriented to person, place, and time.   Psychiatric:         Mood and Affect: Mood normal.         Behavior: Behavior normal.           Diagnoses and health risks identified today and associated recommendations/orders:    1. Encounter for preventive health examination  Pt was seen today for an Annual Wellness visit. Healthcare maintenance and screening recommendations were discussed and updated as indicated. Return in one year for AWV.    Review current opioid prescriptions:n/a  Screen for potential Substance Use Disorders:n/a    2. Atherosclerosis of aorta  Chronic. CXR 9/11/16. The current medical regimen is effective;  continue present plan and medications.    3. Calcified granuloma of lung  Chronic. Chest CT 7/1/13. The current medical regimen is effective;  continue present plan and medications.    4. Bronchiectasis without complication  The current medical regimen is effective;  continue present plan and medications.    5. Mixed hyperlipidemia  The current medical regimen is effective;  continue present plan and medications.    6. Low serum testosterone level  The current medical regimen is effective;  continue present plan and medications.    7. Influenza vaccine needed  Seasonal flu discussed. Understanding verbalized.  - Influenza (FLUAD) - Quadrivalent (Adjuvanted) *Preferred* (65+) (PF)      Provided [place] with a 5-10 year written screening schedule and personal prevention plan. Recommendations were developed using the USPSTF age appropriate recommendations. Education, counseling, and referrals were provided as needed. After Visit Summary printed and given to patient which includes a list of additional screenings\tests needed.    Follow up in about 1 year (around 10/13/2023).    BO Leonard

## 2022-10-13 NOTE — PATIENT INSTRUCTIONS
Counseling and Referral of Other Preventative  (Italic type indicates deductible and co-insurance are waived)    Patient Name: Tommy Keith  Today's Date: 10/13/2022    Health Maintenance       Date Due Completion Date    Pneumococcal Vaccines (Age 65+) (3 - PPSV23 if available, else PCV20) 10/23/2020 1/5/2018    COVID-19 Vaccine (3 - Booster for Pfizer series) 10/15/2021 8/20/2021    Colorectal Cancer Screening 04/20/2023 4/20/2018    High Dose Statin 07/14/2023 7/14/2022    Lipid Panel 06/29/2026 6/29/2021    TETANUS VACCINE 06/29/2031 6/29/2021        Orders Placed This Encounter   Procedures    Influenza (FLUAD) - Quadrivalent (Adjuvanted) *Preferred* (65+) (PF)       The following information is provided to all patients.  This information is to help you find resources for any of the problems found today that may be affecting your health:                Living healthy guide: www.[facility].[place].gov      Understanding Diabetes: www.diabetes.org      Eating healthy: www.cdc.gov/healthyweight      CDC home safety checklist: www.cdc.gov/steadi/patient.html      Agency on Aging: www.goea.[place].gov      Alcoholics anonymous (AA): www.aa.org      Physical Activity: www.royal.nih.gov/lw6ldjx      Tobacco use: www.quitwithusla.org

## 2023-02-02 ENCOUNTER — OFFICE VISIT (OUTPATIENT)
Dept: FAMILY MEDICINE | Facility: CLINIC | Age: 73
End: 2023-02-02
Payer: MEDICARE

## 2023-02-02 VITALS
HEART RATE: 72 BPM | WEIGHT: 184.19 LBS | DIASTOLIC BLOOD PRESSURE: 80 MMHG | SYSTOLIC BLOOD PRESSURE: 106 MMHG | OXYGEN SATURATION: 99 % | BODY MASS INDEX: 28.85 KG/M2

## 2023-02-02 DIAGNOSIS — I70.0 ATHEROSCLEROSIS OF AORTA: ICD-10-CM

## 2023-02-02 DIAGNOSIS — E78.2 MIXED HYPERLIPIDEMIA: ICD-10-CM

## 2023-02-02 DIAGNOSIS — J47.9 BRONCHIECTASIS WITHOUT COMPLICATION: ICD-10-CM

## 2023-02-02 DIAGNOSIS — M25.572 ACUTE LEFT ANKLE PAIN: ICD-10-CM

## 2023-02-02 DIAGNOSIS — Z23 NEED FOR PROPHYLACTIC VACCINATION WITH STREPTOCOCCUS PNEUMONIAE (PNEUMOCOCCUS) AND INFLUENZA VACCINES: ICD-10-CM

## 2023-02-02 DIAGNOSIS — S76.212A STRAIN OF GROIN, LEFT, INITIAL ENCOUNTER: Primary | ICD-10-CM

## 2023-02-02 DIAGNOSIS — L97.411 NON-PRESSURE CHRONIC ULCER OF RIGHT HEEL AND MIDFOOT LIMITED TO BREAKDOWN OF SKIN: ICD-10-CM

## 2023-02-02 PROCEDURE — 1126F PR PAIN SEVERITY QUANTIFIED, NO PAIN PRESENT: ICD-10-PCS | Mod: HCNC,CPTII,S$GLB, | Performed by: NURSE PRACTITIONER

## 2023-02-02 PROCEDURE — 1126F AMNT PAIN NOTED NONE PRSNT: CPT | Mod: HCNC,CPTII,S$GLB, | Performed by: NURSE PRACTITIONER

## 2023-02-02 PROCEDURE — 99999 PR PBB SHADOW E&M-EST. PATIENT-LVL V: ICD-10-PCS | Mod: PBBFAC,HCNC,, | Performed by: NURSE PRACTITIONER

## 2023-02-02 PROCEDURE — 99999 PR PBB SHADOW E&M-EST. PATIENT-LVL V: CPT | Mod: PBBFAC,HCNC,, | Performed by: NURSE PRACTITIONER

## 2023-02-02 PROCEDURE — 3079F PR MOST RECENT DIASTOLIC BLOOD PRESSURE 80-89 MM HG: ICD-10-PCS | Mod: HCNC,CPTII,S$GLB, | Performed by: NURSE PRACTITIONER

## 2023-02-02 PROCEDURE — 3074F PR MOST RECENT SYSTOLIC BLOOD PRESSURE < 130 MM HG: ICD-10-PCS | Mod: HCNC,CPTII,S$GLB, | Performed by: NURSE PRACTITIONER

## 2023-02-02 PROCEDURE — 3079F DIAST BP 80-89 MM HG: CPT | Mod: HCNC,CPTII,S$GLB, | Performed by: NURSE PRACTITIONER

## 2023-02-02 PROCEDURE — 3074F SYST BP LT 130 MM HG: CPT | Mod: HCNC,CPTII,S$GLB, | Performed by: NURSE PRACTITIONER

## 2023-02-02 PROCEDURE — 3008F PR BODY MASS INDEX (BMI) DOCUMENTED: ICD-10-PCS | Mod: HCNC,CPTII,S$GLB, | Performed by: NURSE PRACTITIONER

## 2023-02-02 PROCEDURE — 1101F PT FALLS ASSESS-DOCD LE1/YR: CPT | Mod: HCNC,CPTII,S$GLB, | Performed by: NURSE PRACTITIONER

## 2023-02-02 PROCEDURE — 99214 OFFICE O/P EST MOD 30 MIN: CPT | Mod: HCNC,S$GLB,, | Performed by: NURSE PRACTITIONER

## 2023-02-02 PROCEDURE — 1101F PR PT FALLS ASSESS DOC 0-1 FALLS W/OUT INJ PAST YR: ICD-10-PCS | Mod: HCNC,CPTII,S$GLB, | Performed by: NURSE PRACTITIONER

## 2023-02-02 PROCEDURE — G0009 ADMIN PNEUMOCOCCAL VACCINE: HCPCS | Mod: HCNC,S$GLB,, | Performed by: NURSE PRACTITIONER

## 2023-02-02 PROCEDURE — 1159F MED LIST DOCD IN RCRD: CPT | Mod: HCNC,CPTII,S$GLB, | Performed by: NURSE PRACTITIONER

## 2023-02-02 PROCEDURE — 3288F FALL RISK ASSESSMENT DOCD: CPT | Mod: HCNC,CPTII,S$GLB, | Performed by: NURSE PRACTITIONER

## 2023-02-02 PROCEDURE — 99214 PR OFFICE/OUTPT VISIT, EST, LEVL IV, 30-39 MIN: ICD-10-PCS | Mod: HCNC,S$GLB,, | Performed by: NURSE PRACTITIONER

## 2023-02-02 PROCEDURE — 3008F BODY MASS INDEX DOCD: CPT | Mod: HCNC,CPTII,S$GLB, | Performed by: NURSE PRACTITIONER

## 2023-02-02 PROCEDURE — 90677 PCV20 VACCINE IM: CPT | Mod: HCNC,S$GLB,, | Performed by: NURSE PRACTITIONER

## 2023-02-02 PROCEDURE — 1159F PR MEDICATION LIST DOCUMENTED IN MEDICAL RECORD: ICD-10-PCS | Mod: HCNC,CPTII,S$GLB, | Performed by: NURSE PRACTITIONER

## 2023-02-02 PROCEDURE — G0009 PNEUMOCOCCAL CONJUGATE VACCINE 20-VALENT: ICD-10-PCS | Mod: HCNC,S$GLB,, | Performed by: NURSE PRACTITIONER

## 2023-02-02 PROCEDURE — 90677 PNEUMOCOCCAL CONJUGATE VACCINE 20-VALENT: ICD-10-PCS | Mod: HCNC,S$GLB,, | Performed by: NURSE PRACTITIONER

## 2023-02-02 PROCEDURE — 3288F PR FALLS RISK ASSESSMENT DOCUMENTED: ICD-10-PCS | Mod: HCNC,CPTII,S$GLB, | Performed by: NURSE PRACTITIONER

## 2023-02-02 RX ORDER — DICLOFENAC SODIUM 10 MG/G
2 GEL TOPICAL 2 TIMES DAILY PRN
Qty: 100 G | Refills: 1 | Status: SHIPPED | OUTPATIENT
Start: 2023-02-02

## 2023-02-02 NOTE — PATIENT INSTRUCTIONS
//////////PHONE NUMBERS//////////    Bariatrics---[phone]  Breast Surgery---[phone]  Case Management---[phone]  Colonoscopy---[phone]  Imaging, Xray, CT, MRI, Ultrasound---[phone]  Infectious Disease---[phone]  Interventional Radiology---[phone]  Medical Records---[phone]  [facility]---[phone]  DME---[phone]  Optometry/Ophthalmology---[phone]  [facility]---[phone]  Physical Therapy---[phone]  Psychiatry---[phone] or [phone]  Plastic Surgery---[phone]  Sleep Study---[phone]  Smoking Cessation---[phone]  Vaccine Hotline---[phone]  Wound Care---[phone]  COVID Vaccine center---[phone]  Referral Desk---[phone]    /////////////////////////////////////////////////

## 2023-02-02 NOTE — PROGRESS NOTES
HPI     Chief Complaint:  Chief Complaint   Patient presents with    Ankle Pain     left    Hip Pain    Annual Exam       Tommy Keith is a 72 y.o. male with multiple medical diagnoses as listed in the medical history and problem list that presents for ankle pain and hip pain.  Pt is new to me but is known to this clinic with his last appointment being 10/13/2022.      Ankle Pain   The incident occurred more than 1 week ago. The incident occurred at home. There was no injury mechanism. The pain is present in the left ankle. The quality of the pain is described as aching. The pain is mild. The pain has been Fluctuating since onset. Pertinent negatives include no inability to bear weight, loss of motion, loss of sensation, muscle weakness, numbness or tingling. He reports no foreign bodies present. The symptoms are aggravated by palpation. He has tried rest for the symptoms. The treatment provided mild relief.     Groin pain, left:  Pt reports he over stretched his left upper leg approximately 6 months ago. After which he began to notice mild pain to left groin area. Pt admits to high intensity exercise involving groin muscles several times per week. He believes the activity level has contributed to his pain. Pain is rated as mild, aching, worse in the morning. Symptoms have remained the same. Denies associated symptoms. Mild improvement with OTC pain medication. Denies previous eval of symptoms by healthcare provider.        he is compliant with medications daily without any adverse side effects.    Assessment & Plan     Problem List Items Addressed This Visit          Pulmonary    Bronchiectasis without complication    The current medical regimen is effective;  continue present plan      Overview     -mild basilar bronchiectasis.  Clinically asymptomatic until to covid infection.  Now fully vaccinated.    -recent symptoms may relate to covid infection.  Most recent cxr with increase reticular markings but  not overt consolidation.  This can be c/w recent covid infection. Ok to resume exercise.  -will obtain repeat pft for comparison  -continue with combivent.            Cardiac/Vascular    Mixed hyperlipidemia    discussed ways to lower triglycerides such as cutting simple sugars out of diet (white breads, candies, cookies, cakes, etc.) and reducing/eliminating intake of highly processed trans fatty acids.   Exercise 30 minutes a day for 4-5 days a week.   Eat more fiber.      Atherosclerosis of aorta    -assessed and addressed all modifiable risk factors.  Continue with appropriate management to prevent complications with regards to lipid and BP monitoring.      Overview     US Abdominal AortaPerformed 1/16/2018  Patient with Atherosclerosis of the Aorta.  Stable/asymptomatic. Currently stable on lipid lowering treatment and b/p monitoring.              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 No pain with palpation or movement. No bulging noted. He does have a Hx of left sided hernia which was repaired. No signs of hernia on exam today. He has full ROM of left leg and hip without discomfort.     Likely strain based on Hx.     Decrease intense exercise involving muscle group.     Ice prn    Refer to PT.     Discussed DDx, condition, and treatment.   Education sent to patient portal/included in after visit summary.  ED precautions given.   Notify provider if symptoms do not resolve or increase in severity.   Patient verbalizes understanding and agrees with plan of care.      Relevant Orders    Ambulatory referral/consult to Physical/Occupational Therapy    Acute left ankle pain        Mild tenderness to palpation. No redness or swelling noted. Full ROM without discomfort.      Avoid strenuous activity to area.     Voltaren prn    Refer to PT.     Relevant Medications    diclofenac sodium (VOLTAREN) 1 % Gel    Need for prophylactic vaccination with Streptococcus pneumoniae (Pneumococcus) and Influenza vaccines        Relevant Orders    (In Office Administered) Pneumococcal Conjugate Vaccine (20 Valent) (IM) (Completed)            --------------------------------------------      Health Maintenance:  Health Maintenance         Date Due Completion Date    Pneumococcal Vaccines (Age 65+) (3 - PPSV23 if available, else PCV20) 10/23/2020 1/5/2018    COVID-19 Vaccine (3 - Booster for Pfizer series) 10/15/2021 8/20/2021    Colorectal Cancer Screening 04/20/2023 4/20/2018    High Dose Statin 10/13/2023 10/13/2022    Hemoglobin A1c (Prediabetes) 10/13/2023 10/13/2022    Lipid Panel 10/13/2027 10/13/2022    TETANUS VACCINE 06/29/2031 6/29/2021            Health maintenance reviewed.  pcv20    Follow Up:  Follow up in about 2 weeks (around 2/16/2023), or if symptoms worsen or fail to improve.    Exam     Review of Systems:  (as noted above)  Review of Systems   Constitutional:  Negative for fever.   HENT:  Negative for trouble swallowing.    Eyes:  Negative for visual disturbance.   Respiratory:  Negative for chest tightness and shortness of breath.    Cardiovascular:  Negative for chest pain.   Gastrointestinal:  Negative for blood in stool.   Musculoskeletal:  Positive for arthralgias. Negative for gait problem, joint swelling, neck pain and neck stiffness.   Neurological:  Negative for tingling and numbness.     Physical Exam:   Physical Exam  Constitutional:       General: He is not in acute distress.     Appearance: He is not ill-appearing or diaphoretic.   HENT:      Head: Normocephalic and atraumatic.   Eyes:      General: No scleral icterus.  Cardiovascular:      Rate and Rhythm: Normal rate and regular rhythm.      Pulses: Normal pulses.      Heart sounds: No murmur heard.    No friction  rub. No gallop.   Pulmonary:      Effort: No respiratory distress.   Chest:      Chest wall: No tenderness.   Musculoskeletal:      Cervical back: No rigidity.   Skin:     Capillary Refill: Capillary refill takes 2 to 3 seconds.   Neurological:      General: No focal deficit present.      Mental Status: He is alert and oriented to person, place, and time.     Vitals:    23 1006   BP: 106/80   BP Location: Right arm   Patient Position: Sitting   BP Method: Large (Manual)   Pulse: 72   SpO2: 99%   Weight: 83.6 kg (184 lb 3.1 oz)      Body mass index is 28.85 kg/m².        History     Past Medical History:  Past Medical History:   Diagnosis Date    Asthma, intermittent     irritant induced    History of BPH     s/p laser TURP    History of hepatitis C     s/p treatment, in remission    History of migraine headaches     History of multiple pulmonary nodules     Hyperlipidemia     Low serum testosterone level     Migraine headache     OA (osteoarthritis)     Obesity     Sciatica of left side     Spinal stenosis        Past Surgical History:  Past Surgical History:   Procedure Laterality Date    arthroscopy of both knees      COLONOSCOPY N/A 2018    Procedure: COLONOSCOPY;  Surgeon: Jovon Nunez MD;  [facility]: [facility];  Service: Endoscopy;  Laterality: N/A;    left inguinal hernia repair      LIVER BIOPSY      tonsillectomy      TONSILLECTOMY      UMBILICAL HERNIA REPAIR      VENTRAL HERNIA REPAIR         Social History:  Social History     Socioeconomic History    Marital status:      Spouse name: Deloris    Number of children: 1   Occupational History    Occupation: works at a chemical plant    Occupation: Retired   Tobacco Use    Smoking status: Former     Types: Cigarettes     Quit date: 2006     Years since quittin.4    Smokeless tobacco: Never   Substance and Sexual Activity    Alcohol use: No    Drug use: No    Sexual activity: Yes     Partners: Female   Social History Narrative    He  is walking regularly.       Family History:  Family History   Problem Relation Age of Onset    Heart disease Father     Clotting disorder Brother     Diabetes Brother     Hyperlipidemia Brother     Cancer Brother     Diabetes Sister     Hyperlipidemia Sister        Allergies and Medications: (updated and reviewed)  Review of patient's allergies indicates:   Allergen Reactions    Levaquin [levofloxacin]      Current Outpatient Medications   Medication Sig Dispense Refill    ASCORBIC ACID, VITAMIN C, ORAL Take by mouth.      aspirin (ECOTRIN) 81 MG EC tablet Take 81 mg by mouth once daily.      atorvastatin (LIPITOR) 80 MG tablet TAKE 1 TABLET BY MOUTH EVERY EVENING 90 tablet 3    COMBIVENT RESPIMAT  mcg/actuation inhaler INHALE 2 PUFFS INTO THE LUNGS EVERY 4 (FOUR) HOURS AS NEEDED FOR WHEEZING. RESCUE 4 g 3    cyanocobalamin (VITAMIN B-12) 100 MCG tablet Take by mouth.      fluticasone propionate (FLONASE) 50 mcg/actuation nasal spray INSTILL 2 SPRAYS INTO EACH NOSTRIL DAILY AS NEEDED 48 mL 0    FOLIC ACID/MULTIVIT-MIN/LUTEIN (CENTRUM SILVER ORAL) Take by mouth.      glucosamine-chondroitin 500-400 mg tablet Take 1 tablet by mouth 3 (three) times daily.      KRILL OIL ORAL Take by mouth.      NON FORMULARY MEDICATION Take by mouth.      omega-3 fatty acids/fish oil (FISH OIL-OMEGA-3 FATTY ACIDS) 300-1,000 mg capsule Take 1 capsule by mouth once daily.      sumatriptan (IMITREX STATDOSE) 6 mg/0.5 mL kit USE AS DIRECTED MAY REPEAT IN 1 HR MAX 2 DOSES PER 24 HOURS 1 mL 6    testosterone cypionate (DEPOTESTOTERONE CYPIONATE) 200 mg/mL injection Inject 200 mg into the muscle every 14 (fourteen) days.  3    topiramate (TOPAMAX) 25 MG tablet TAKE 3 TABLETS (75 MG TOTAL) BY MOUTH EVERY EVENING. 270 tablet 1    topiramate (TOPAMAX) 25 MG tablet Take 75 mg by mouth.      vitamin E, dl, acetate, 22.5 mg (50 unit)/mL Drop Take by mouth.      diclofenac sodium (VOLTAREN) 1 % Gel Apply 2 g topically 2 (two) times daily as  needed (pain). 100 g 1    FLUZONE HIGHDOSE QUAD 21-22  mcg/0.7 mL Syrg        No current facility-administered medications for this visit.       Patient Care Team:  Angelica Gomez MD as PCP - General (Family Medicine)  Leonela Correa LPN as Licensed Practical Nurse      The patient expressed understanding and no barriers to adherence were identified.      - The patient indicates understanding of these issues and agrees with the plan. Brief care plan is updated and reviewed with the patient as applicable.      - The patient is given an After Visit Summary that lists all medications with directions, allergies, education, orders placed during this encounter and follow-up instructions.      - I have reviewed the patient's medical information including past medical, family, and social history sections including the medications and allergies.      - We discussed the patient's current medications.     This note was created by combination of typed  and MModal dictation.  Transcription errors may be present.  If there are any questions, please contact me.       Pepe Jacobs NP

## 2023-02-07 DIAGNOSIS — Z00.00 ENCOUNTER FOR MEDICARE ANNUAL WELLNESS EXAM: ICD-10-CM

## 2023-02-09 DIAGNOSIS — Z00.00 ENCOUNTER FOR MEDICARE ANNUAL WELLNESS EXAM: ICD-10-CM

## 2023-03-01 ENCOUNTER — PES CALL (OUTPATIENT)
Dept: ADMINISTRATIVE | Facility: CLINIC | Age: 73
End: 2023-03-01
Payer: MEDICARE

## 2023-03-03 ENCOUNTER — OFFICE VISIT (OUTPATIENT)
Dept: FAMILY MEDICINE | Facility: CLINIC | Age: 73
End: 2023-03-03
Payer: MEDICARE

## 2023-03-03 VITALS
BODY MASS INDEX: 29.47 KG/M2 | OXYGEN SATURATION: 98 % | SYSTOLIC BLOOD PRESSURE: 118 MMHG | WEIGHT: 187.75 LBS | HEIGHT: 67 IN | DIASTOLIC BLOOD PRESSURE: 78 MMHG | TEMPERATURE: 98 F | HEART RATE: 63 BPM

## 2023-03-03 DIAGNOSIS — E78.2 MIXED HYPERLIPIDEMIA: ICD-10-CM

## 2023-03-03 DIAGNOSIS — I70.0 ATHEROSCLEROSIS OF AORTA: ICD-10-CM

## 2023-03-03 DIAGNOSIS — J84.10 CALCIFIED GRANULOMA OF LUNG: ICD-10-CM

## 2023-03-03 DIAGNOSIS — J47.9 BRONCHIECTASIS WITHOUT COMPLICATION: ICD-10-CM

## 2023-03-03 DIAGNOSIS — Z00.00 ENCOUNTER FOR PREVENTIVE HEALTH EXAMINATION: Primary | ICD-10-CM

## 2023-03-03 DIAGNOSIS — Z86.19 HISTORY OF HEPATITIS C: ICD-10-CM

## 2023-03-03 PROCEDURE — 3008F BODY MASS INDEX DOCD: CPT | Mod: HCNC,CPTII,S$GLB, | Performed by: NURSE PRACTITIONER

## 2023-03-03 PROCEDURE — 1101F PR PT FALLS ASSESS DOC 0-1 FALLS W/OUT INJ PAST YR: ICD-10-PCS | Mod: HCNC,CPTII,S$GLB, | Performed by: NURSE PRACTITIONER

## 2023-03-03 PROCEDURE — 1126F AMNT PAIN NOTED NONE PRSNT: CPT | Mod: HCNC,CPTII,S$GLB, | Performed by: NURSE PRACTITIONER

## 2023-03-03 PROCEDURE — 3074F SYST BP LT 130 MM HG: CPT | Mod: HCNC,CPTII,S$GLB, | Performed by: NURSE PRACTITIONER

## 2023-03-03 PROCEDURE — 1126F PR PAIN SEVERITY QUANTIFIED, NO PAIN PRESENT: ICD-10-PCS | Mod: HCNC,CPTII,S$GLB, | Performed by: NURSE PRACTITIONER

## 2023-03-03 PROCEDURE — G0439 PPPS, SUBSEQ VISIT: HCPCS | Mod: HCNC,S$GLB,, | Performed by: NURSE PRACTITIONER

## 2023-03-03 PROCEDURE — 1170F FXNL STATUS ASSESSED: CPT | Mod: HCNC,CPTII,S$GLB, | Performed by: NURSE PRACTITIONER

## 2023-03-03 PROCEDURE — 99999 PR PBB SHADOW E&M-EST. PATIENT-LVL V: ICD-10-PCS | Mod: PBBFAC,HCNC,, | Performed by: NURSE PRACTITIONER

## 2023-03-03 PROCEDURE — 99999 PR PBB SHADOW E&M-EST. PATIENT-LVL V: CPT | Mod: PBBFAC,HCNC,, | Performed by: NURSE PRACTITIONER

## 2023-03-03 PROCEDURE — 3008F PR BODY MASS INDEX (BMI) DOCUMENTED: ICD-10-PCS | Mod: HCNC,CPTII,S$GLB, | Performed by: NURSE PRACTITIONER

## 2023-03-03 PROCEDURE — 1170F PR FUNCTIONAL STATUS ASSESSED: ICD-10-PCS | Mod: HCNC,CPTII,S$GLB, | Performed by: NURSE PRACTITIONER

## 2023-03-03 PROCEDURE — 3078F DIAST BP <80 MM HG: CPT | Mod: HCNC,CPTII,S$GLB, | Performed by: NURSE PRACTITIONER

## 2023-03-03 PROCEDURE — 1160F RVW MEDS BY RX/DR IN RCRD: CPT | Mod: HCNC,CPTII,S$GLB, | Performed by: NURSE PRACTITIONER

## 2023-03-03 PROCEDURE — G0439 PR MEDICARE ANNUAL WELLNESS SUBSEQUENT VISIT: ICD-10-PCS | Mod: HCNC,S$GLB,, | Performed by: NURSE PRACTITIONER

## 2023-03-03 PROCEDURE — 1159F PR MEDICATION LIST DOCUMENTED IN MEDICAL RECORD: ICD-10-PCS | Mod: HCNC,CPTII,S$GLB, | Performed by: NURSE PRACTITIONER

## 2023-03-03 PROCEDURE — 3288F PR FALLS RISK ASSESSMENT DOCUMENTED: ICD-10-PCS | Mod: HCNC,CPTII,S$GLB, | Performed by: NURSE PRACTITIONER

## 2023-03-03 PROCEDURE — 3288F FALL RISK ASSESSMENT DOCD: CPT | Mod: HCNC,CPTII,S$GLB, | Performed by: NURSE PRACTITIONER

## 2023-03-03 PROCEDURE — 3074F PR MOST RECENT SYSTOLIC BLOOD PRESSURE < 130 MM HG: ICD-10-PCS | Mod: HCNC,CPTII,S$GLB, | Performed by: NURSE PRACTITIONER

## 2023-03-03 PROCEDURE — 1160F PR REVIEW ALL MEDS BY PRESCRIBER/CLIN PHARMACIST DOCUMENTED: ICD-10-PCS | Mod: HCNC,CPTII,S$GLB, | Performed by: NURSE PRACTITIONER

## 2023-03-03 PROCEDURE — 3078F PR MOST RECENT DIASTOLIC BLOOD PRESSURE < 80 MM HG: ICD-10-PCS | Mod: HCNC,CPTII,S$GLB, | Performed by: NURSE PRACTITIONER

## 2023-03-03 PROCEDURE — 1159F MED LIST DOCD IN RCRD: CPT | Mod: HCNC,CPTII,S$GLB, | Performed by: NURSE PRACTITIONER

## 2023-03-03 PROCEDURE — 1101F PT FALLS ASSESS-DOCD LE1/YR: CPT | Mod: HCNC,CPTII,S$GLB, | Performed by: NURSE PRACTITIONER

## 2023-03-03 NOTE — PATIENT INSTRUCTIONS
Counseling and Referral of Other Preventative  (Italic type indicates deductible and co-insurance are waived)    Patient Name: Tommy Keith  Today's Date: 3/3/2023    Health Maintenance       Date Due Completion Date    COVID-19 Vaccine (3 - Booster for Pfizer series) 10/15/2021 8/20/2021    Colorectal Cancer Screening 04/20/2023 4/20/2018    Hemoglobin A1c (Prediabetes) 10/13/2023 10/13/2022    High Dose Statin 02/02/2024 2/2/2023    Lipid Panel 10/13/2027 10/13/2022    TETANUS VACCINE 06/29/2031 6/29/2021        No orders of the defined types were placed in this encounter.      The following information is provided to all patients.  This information is to help you find resources for any of the problems found today that may be affecting your health:                Living healthy guide: www.[facility].[place].gov      Understanding Diabetes: www.diabetes.org      Eating healthy: www.cdc.gov/healthyweight      [facility] home safety checklist: www.cdc.gov/steadi/patient.html      Agency on Aging: www.goea.[place].[facility]      Alcoholics anonymous (AA): www.aa.org      Physical Activity: www.royal.nih.gov/dp7vyog      Tobacco use: www.quitwithusla.org

## 2023-03-03 NOTE — PROGRESS NOTES
"  Tommy Keith presented for a  Medicare AWV and comprehensive Health Risk Assessment today. The following components were reviewed and updated:    Medical history  Family History  Social history  Allergies and Current Medications  Health Risk Assessment  Health Maintenance  Care Team       ** See Completed Assessments for Annual Wellness Visit within the encounter summary.**       The following assessments were completed:  Living Situation  CAGE  Depression Screening  Timed Get Up and Go  Whisper Test  Cognitive Function Screening  Nutrition Screening  ADL Screening  PAQ Screening          Vitals:    03/03/23 0857   BP: 118/78   Pulse: 63   Temp: 98 °F (36.7 °C)   TempSrc: Oral   SpO2: 98%   Weight: 85.2 kg (187 lb 11.6 oz)   Height: 5' 7" (1.702 m)     Body mass index is 29.4 kg/m².  Physical Exam  Vitals and nursing note reviewed.   Constitutional:       Appearance: Normal appearance.   Cardiovascular:      Rate and Rhythm: Normal rate.      Pulses: Normal pulses.      Heart sounds: Normal heart sounds.   Pulmonary:      Effort: Pulmonary effort is normal.      Breath sounds: Normal breath sounds.   Musculoskeletal:         General: Normal range of motion.   Neurological:      Mental Status: He is alert and oriented to person, place, and time.   Psychiatric:         Mood and Affect: Mood normal.         Behavior: Behavior normal.           Diagnoses and health risks identified today and associated recommendations/orders:    1. Encounter for preventive health examination  Pt was seen today for an Annual Wellness visit. Healthcare maintenance and screening recommendations were discussed and updated as indicated. Return in one year for AWV.    Review current opioid prescriptions:n/a  Screen for potential Substance Use Disorders:n/a    2. Atherosclerosis of aorta  The current medical regimen is effective;  continue present plan and medications.    3. Calcified granuloma of lung  The current medical regimen is " effective;  continue present plan and medications.    4. Bronchiectasis without complication  The current medical regimen is effective;  continue present plan and medications.    5. Mixed hyperlipidemia  The current medical regimen is effective;  continue present plan and medications.    6. History of hepatitis C  The current medical regimen is effective;  continue present plan and medications.        Provided [place] with a 5-10 year written screening schedule and personal prevention plan. Recommendations were developed using the USPSTF age appropriate recommendations. Education, counseling, and referrals were provided as needed. After Visit Summary printed and given to patient which includes a list of additional screenings\tests needed.    Follow up in about 1 year (around 3/3/2024).    BO Leonard  I offered to discuss advanced care planning, including how to pick a person who would make decisions for you if you were unable to make them for yourself, called a health care power of , and what kind of decisions you might make such as use of life sustaining treatments such as ventilators and tube feeding when faced with a life limiting illness recorded on a living will that they will need to know. (How you want to be cared for as you near the end of your natural life)     X Patient is interested in learning more about how to make advanced directives.  I provided them paperwork and offered to discuss this with them.

## 2023-03-09 ENCOUNTER — CLINICAL SUPPORT (OUTPATIENT)
Dept: REHABILITATION | Facility: HOSPITAL | Age: 73
End: 2023-03-09
Payer: MEDICARE

## 2023-03-09 DIAGNOSIS — M76.829 TIBIALIS POSTERIOR DYSFUNCTION: ICD-10-CM

## 2023-03-09 DIAGNOSIS — G89.29 CHRONIC PAIN OF LEFT ANKLE: ICD-10-CM

## 2023-03-09 DIAGNOSIS — M25.572 ACUTE LEFT ANKLE PAIN: ICD-10-CM

## 2023-03-09 DIAGNOSIS — S76.212A STRAIN OF GROIN, LEFT, INITIAL ENCOUNTER: ICD-10-CM

## 2023-03-09 DIAGNOSIS — M25.572 CHRONIC PAIN OF LEFT ANKLE: ICD-10-CM

## 2023-03-09 PROCEDURE — 97161 PT EVAL LOW COMPLEX 20 MIN: CPT | Mod: HCNC,PN

## 2023-03-09 PROCEDURE — 97110 THERAPEUTIC EXERCISES: CPT | Mod: HCNC,PN

## 2023-03-09 NOTE — PROGRESS NOTES
" [facility]  Physical Therapy Initial Evaluation    Date: 3/9/2023   Name: Tommy REYES [facility]  Clinic Number: [id number]    Therapy Diagnosis:   Encounter Diagnoses   Name Primary?    Strain of groin, left, initial encounter     Acute left ankle pain     Chronic pain of left ankle     Tibialis posterior dysfunction      Physician: Pepe Jacobs, NP    Physician Orders: PT Eval and Treat   Medical Diagnosis from Referral: S76.212A (ICD-10-CM) - Strain of groin, left, initial encounter M25.572 (ICD-10-CM) - Acute left ankle pain   Evaluation Date: 3/9/2023  Authorization Period Expiration: 2/2/2024  Plan of Care Expiration: 4/21/2023  Visit # / Visits authorized: 1/ 1 Progress Note Due: 4/9/23  FOTO: 1/ 1    Precautions: Standard    Time In: 0815  Time Out: 0915  Total Appointment Time (timed & untimed codes): 60 minutes    Subjective   Date of onset: 6mo - insidious  History of current condition - Tommy reports: No pain while walking however it is sore to the touch on the  medial side of his L ankle. No loss of motor function or ROM. Is performing regular cardio in the morning and returns to the gym in the evening with his grandson to perform weight training without limitation from L ankle.       ENRIQUE: insidious  Any popping: no  Any locking: no  Any clicking: no  Pain radiates: no localized to L medial distal ankle just superior and inferior to medial malleolus  Pain is constant or intermittent: intermittent   Any injections:  no    Pain:  Current 0/10, worst 2/10, best 0/10   Location: left ankles  Description: sensitive   Aggravating Factors: Touching  Easing Factors:  not touching     Prior Therapy: yes - for RTCr surgery   Social History:  lives with their spouse  Occupation: retired  Prior Level of Function: full   Current level of function: full     Pts goals: "I'm not sure why I am here"         Medical History:   Past Medical History:   Diagnosis Date    Asthma, " intermittent     irritant induced    History of BPH     s/p laser TURP    History of hepatitis C     s/p treatment, in remission    History of migraine headaches     History of multiple pulmonary nodules     Hyperlipidemia     Low serum testosterone level     Migraine headache     OA (osteoarthritis)     Obesity     Sciatica of left side     Spinal stenosis        Surgical History:   Tommy Keith  has a past surgical history that includes tonsillectomy; left inguinal hernia repair; Umbilical hernia repair; Ventral hernia repair; arthroscopy of both knees; Liver biopsy; Tonsillectomy; and Colonoscopy (N/A, 4/20/2018).    Medications:   Tommy has a current medication list which includes the following prescription(s): ascorbic acid, aspirin, atorvastatin, combivent respimat, cyanocobalamin, diclofenac sodium, fluticasone propionate, fluzone highdose quad 21-22 pf, folic acid/multivit-min/lutein, glucosamine-chondroitin, krill oil, NON FORMULARY MEDICATION, fish oil-omega-3 fatty acids, sumatriptan, testosterone cypionate, topiramate, topiramate, and vitamin e (dl, acetate).    Allergies:   Review of patient's allergies indicates:   Allergen Reactions    Levaquin [levofloxacin]           Objective       Observation: good posture, good postural awareness, good squat and dead lift form as well as weight training knowledge, no gait deficits, no loss of ROM or strength in either LE    Sensation: Light touch: intact     DTR: intact         Hip Range of Motion:  WNL B    Lower Extremity Strength  Right LE  Left LE    Knee extension: 5/5 Knee extension: 5/5   Knee flexion: 5/5 Knee flexion: 5/5   Hip flexion: 5/5 Hip flexion: 5/5   Hip Internal Rotation:  5/5    Hip Internal Rotation: 5/5      Hip External Rotation: 5/5    Hip External Rotation: 5/5      Hip extension:  5/5 Hip extension: 5/5   Hip abduction: 5/5 Hip abduction: 5/5   Hip adduction: 5/5 Hip adduction: 5/5   Ankle dorsiflexion: 5/5 Ankle dorsiflexion:  "5/5   Ankle plantarflexion: 5/5 Ankle plantarflexion: 5/5       Joint Mobility: TC joint L - lacks CKC DF slightly more than R TC    Palpation: TTP over L posterior tib posterior to medial malleolus             CMS Impairment/Limitation/Restriction for FOTO LEFS Survey    Therapist reviewed FOTO scores for Tommy Keith on 3/9/2023.   FOTO documents [facility] into [facility] - see Media section.    Limitation Score: 1%           TREATMENT     Total Treatment time separate from Evaluation: 10 minutes    Tommy received therapeutic exercises to develop strength, endurance, ROM, flexibility, posture, and core stabilization for 10 minutes including:  Posterior tib SL strength training 2x10 c YTB  Gastroc stretch in standing 3x30"  Soleus stretch in standing 3x30"  Posterior tib stretching in standing 3x30"        Home Exercises and Patient Education Provided    Education provided:   - HEP    Written Home Exercises Provided: yes.  Exercises were reviewed and Tommy was able to demonstrate them prior to the end of the session.  Tommy demonstrated good  understanding of the education provided.     See EMR under Patient Instructions for exercises provided 3/9/2023.    Assessment   Tommy is a 72 y.o. male referred to outpatient Physical Therapy with a medical diagnosis of S76.212A (ICD-10-CM) - Strain of groin, left, initial encounter M25.572 (ICD-10-CM) - Acute left ankle pain . Pt presents with no strength deficits, slight hypomobility in L TC however functionally ROM is good, no gait deficits or edema present in LLE. Pt has tenderness over L distal posterior tib tendon however it does not limit him. DPT instructed pt to perform CFM and stretches provided to pt and return in 3wks to assess any changes in tenderness. Pt will not require skilled PT for extended times and will return for 1-2 visits to assess for any changes to TTP to L post tib.     Pt prognosis is Good.   Pt will benefit from skilled outpatient Physical " Therapy to address the deficits stated above and in the chart below, provide pt/family education, and to maximize pt's level of independence.     Plan of care discussed with patient: Yes  Pt's spiritual, cultural and educational needs considered and patient is agreeable to the plan of care and goals as stated below:     Anticipated Barriers for therapy: none    Medical Necessity is demonstrated by the following  History  Co-morbidities and personal factors that may impact the plan of care Co-morbidities:   Past Medical History:   Diagnosis Date    Asthma, intermittent     irritant induced    History of BPH     s/p laser TURP    History of hepatitis C     s/p treatment, in remission    History of migraine headaches     History of multiple pulmonary nodules     Hyperlipidemia     Low serum testosterone level     Migraine headache     OA (osteoarthritis)     Obesity     Sciatica of left side     Spinal stenosis        Personal Factors:   no deficits     low   Examination  Body Structures and Functions, activity limitations and participation restrictions that may impact the plan of care Body Regions:   lower extremities    Body Systems:    TTP    Participation Restrictions:   none    Activity limitations:   Learning and applying knowledge  no deficits    General Tasks and Commands  no deficits    Communication  no deficits    Mobility  no deficits    Self care  no deficits    Domestic Life  no deficits    Interactions/Relationships  no deficits    Life Areas  no deficits    Community and Social Life  no deficits         Complexity: low  See FOTO outcome assessment    Clinical Presentation stable and uncomplicated low   Decision Making/ Complexity Score: low     GOALS: Short Term Goals:  3 weeks  1.Pt to tolerate HEP to improve ROM and independence with ADL's.On going  2. Pt to report a 50% reduction in TTP on L posterior tib      Long Term Goals:  6 weeks  1.Pt to tolerate HEP to improve ROM and independence with  ADL's.On going  2. Pt to report a 80% reduction in TTP on L posterior tib    Plan   Plan of care Certification: 3/9/2023 to 4/21/2023.    Outpatient Physical Therapy 1 times every 3wks for 2 visits total  to include the following interventions: Manual Therapy, Neuromuscular Re-ed, Patient Education, Self Care, Therapeutic Activities, and Therapeutic Exercise. Verona Crain, PT, DPT, Cert DN      I CERTIFY THE NEED FOR THESE SERVICES FURNISHED UNDER THIS PLAN OF TREATMENT AND WHILE UNDER MY CARE   Physician's comments:     Physician's Signature: ___________________________________________________

## 2023-03-17 NOTE — PLAN OF CARE
" [facility]  Physical Therapy Initial Evaluation    Date: 3/9/2023   Name: Tommy REYES [facility]  Clinic Number: [id number]    Therapy Diagnosis:   Encounter Diagnoses   Name Primary?    Strain of groin, left, initial encounter     Acute left ankle pain     Chronic pain of left ankle     Tibialis posterior dysfunction      Physician: Pepe Jacobs, NP    Physician Orders: PT Eval and Treat   Medical Diagnosis from Referral: S76.212A (ICD-10-CM) - Strain of groin, left, initial encounter M25.572 (ICD-10-CM) - Acute left ankle pain   Evaluation Date: 3/9/2023  Authorization Period Expiration: 2/2/2024  Plan of Care Expiration: 4/21/2023  Visit # / Visits authorized: 1/ 1 Progress Note Due: 4/9/23  FOTO: 1/ 1    Precautions: Standard    Time In: 0815  Time Out: 0915  Total Appointment Time (timed & untimed codes): 60 minutes    Subjective   Date of onset: 6mo - insidious  History of current condition - Tommy reports: No pain while walking however it is sore to the touch on the  medial side of his L ankle. No loss of motor function or ROM. Is performing regular cardio in the morning and returns to the gym in the evening with his grandson to perform weight training without limitation from L ankle.       ENRIQUE: insidious  Any popping: no  Any locking: no  Any clicking: no  Pain radiates: no localized to L medial distal ankle just superior and inferior to medial malleolus  Pain is constant or intermittent: intermittent   Any injections:  no    Pain:  Current 0/10, worst 2/10, best 0/10   Location: left ankles  Description: sensitive   Aggravating Factors: Touching  Easing Factors: not touching     Prior Therapy: yes - for RTCr surgery   Social History:  lives with their spouse  Occupation: retired  Prior Level of Function: full   Current level of function: full     Pts goals: "I'm not sure why I am here"         Medical History:   Past Medical History:   Diagnosis Date    Asthma, " intermittent     irritant induced    History of BPH     s/p laser TURP    History of hepatitis C     s/p treatment, in remission    History of migraine headaches     History of multiple pulmonary nodules     Hyperlipidemia     Low serum testosterone level     Migraine headache     OA (osteoarthritis)     Obesity     Sciatica of left side     Spinal stenosis        Surgical History:   Tommy Keith  has a past surgical history that includes tonsillectomy; left inguinal hernia repair; Umbilical hernia repair; Ventral hernia repair; arthroscopy of both knees; Liver biopsy; Tonsillectomy; and Colonoscopy (N/A, 4/20/2018).    Medications:   Tommy has a current medication list which includes the following prescription(s): ascorbic acid, aspirin, atorvastatin, combivent respimat, cyanocobalamin, diclofenac sodium, fluticasone propionate, fluzone highdose quad 21-22 pf, folic acid/multivit-min/lutein, glucosamine-chondroitin, krill oil, NON FORMULARY MEDICATION, fish oil-omega-3 fatty acids, sumatriptan, testosterone cypionate, topiramate, topiramate, and vitamin e (dl, acetate).    Allergies:   Review of patient's allergies indicates:   Allergen Reactions    Levaquin [levofloxacin]           Objective       Observation: good posture, good postural awareness, good squat and dead lift form as well as weight training knowledge, no gait deficits, no loss of ROM or strength in either LE    Sensation: Light touch: intact     DTR: intact         Hip Range of Motion:  WNL B    Lower Extremity Strength  Right LE  Left LE    Knee extension: 5/5 Knee extension: 5/5   Knee flexion: 5/5 Knee flexion: 5/5   Hip flexion: 5/5 Hip flexion: 5/5   Hip Internal Rotation:  5/5    Hip Internal Rotation: 5/5      Hip External Rotation: 5/5    Hip External Rotation: 5/5      Hip extension:  5/5 Hip extension: 5/5   Hip abduction: 5/5 Hip abduction: 5/5   Hip adduction: 5/5 Hip adduction: 5/5   Ankle dorsiflexion: 5/5 Ankle dorsiflexion:  "5/5   Ankle plantarflexion: 5/5 Ankle plantarflexion: 5/5       Joint Mobility: TC joint L - lacks CKC DF slightly more than R TC    Palpation: TTP over L posterior tib posterior to medial malleolus             CMS Impairment/Limitation/Restriction for FOTO LEFS Survey    Therapist reviewed FOTO scores for Tommy Keith on 3/9/2023.   FOTO documents entered into [facility] - see Media section.    Limitation Score: 1%           TREATMENT     Total Treatment time separate from Evaluation: 10 minutes    Tommy received therapeutic exercises to develop strength, endurance, ROM, flexibility, posture, and core stabilization for 10 minutes including:  Posterior tib SL strength training 2x10 c YTB  Gastroc stretch in standing 3x30"  Soleus stretch in standing 3x30"  Posterior tib stretching in standing 3x30"        Home Exercises and Patient Education Provided    Education provided:   - HEP    Written Home Exercises Provided: yes.  Exercises were reviewed and Tommy was able to demonstrate them prior to the end of the session.  Tommy demonstrated good  understanding of the education provided.     See EMR under Patient Instructions for exercises provided 3/9/2023.    Assessment   Tommy is a 72 y.o. male referred to outpatient Physical Therapy with a medical diagnosis of S76.212A (ICD-10-CM) - Strain of groin, left, initial encounter M25.572 (ICD-10-CM) - Acute left ankle pain . Pt presents with no strength deficits, slight hypomobility in L TC however functionally ROM is good, no gait deficits or edema present in LLE. Pt has tenderness over L distal posterior tib tendon however it does not limit him. DPT instructed pt to perform CFM and stretches provided to pt and return in 3wks to assess any changes in tenderness. Pt will not require skilled PT for extended times and will return for 1-2 visits to assess for any changes to TTP to L post tib.     Pt prognosis is Good.   Pt will benefit from skilled outpatient Physical " Therapy to address the deficits stated above and in the chart below, provide pt/family education, and to maximize pt's level of independence.     Plan of care discussed with patient: Yes  Pt's spiritual, cultural and educational needs considered and patient is agreeable to the plan of care and goals as stated below:     Anticipated Barriers for therapy: none    Medical Necessity is demonstrated by the following  History  Co-morbidities and personal factors that may impact the plan of care Co-morbidities:   Past Medical History:   Diagnosis Date    Asthma, intermittent     irritant induced    History of BPH     s/p laser TURP    History of hepatitis C     s/p treatment, in remission    History of migraine headaches     History of multiple pulmonary nodules     Hyperlipidemia     Low serum testosterone level     Migraine headache     OA (osteoarthritis)     Obesity     Sciatica of left side     Spinal stenosis        Personal Factors:   no deficits     low   Examination  Body Structures and Functions, activity limitations and participation restrictions that may impact the plan of care Body Regions:   lower extremities    Body Systems:    TTP    Participation Restrictions:   none    Activity limitations:   Learning and applying knowledge  no deficits    General Tasks and Commands  no deficits    Communication  no deficits    Mobility  no deficits    Self care  no deficits    Domestic Life  no deficits    Interactions/Relationships  no deficits    Life Areas  no deficits    Community and Social Life  no deficits         Complexity: low  See FOTO outcome assessment    Clinical Presentation stable and uncomplicated low   Decision Making/ Complexity Score: low     GOALS: Short Term Goals:  3 weeks  1.Pt to tolerate HEP to improve ROM and independence with ADL's.On going  2. Pt to report a 50% reduction in TTP on L posterior tib      Long Term Goals:  6 weeks  1.Pt to tolerate HEP to improve ROM and independence with  ADL's.On going  2. Pt to report a 80% reduction in TTP on L posterior tib    Plan   Plan of care Certification: 3/9/2023 to 4/21/2023.    Outpatient Physical Therapy 1 times every 3wks for 2 visits total  to include the following interventions: Manual Therapy, Neuromuscular Re-ed, Patient Education, Self Care, Therapeutic Activities, and Therapeutic Exercise. Ver[place] Crain, PT, DPT, Cert DN      I CERTIFY THE NEED FOR THESE SERVICES FURNISHED UNDER THIS PLAN OF TREATMENT AND WHILE UNDER MY CARE   Physician's comments:     Physician's Signature: ___________________________________________________

## 2023-06-11 NOTE — PROGRESS NOTES
Subjective:    Patient ID:  Tommy Keith is a 68 y.o. male who presents for follow-up of Results      HPI   previous history:  Here for follow-up of chest pain.  He again is experiencing significant substernal chest tightness which is somewhat different than his episodes in the past.  It is left-sided and does occur with exertion but can also happen at rest.  He denies any other associated symptoms.  There has been no significant radiation or nausea/vomiting.  He denies any PND, orthopnea or lower extremity edema. He has not experienced any dizziness, presyncope or syncope.  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  He mainly was concerned with a strong family history and wants to get back and exercise program as well.    Today:  Here for follow-up of chest pain.  He feels better as been working out without any problems.  He underwent nuclear stress as below.  This was within normal limits.  He denies any other associated symptoms.  He has experienced no PND, orthopnea or lower extremity edema. He denies any dizziness, presyncope or syncope.  He is scheduled for possible urological procedure and was asking about clearance.      Review of Systems   Constitution: Negative.   HENT: Negative.    Eyes: Negative.    Cardiovascular: Negative for chest pain, dyspnea on exertion, irregular heartbeat, leg swelling, near-syncope, orthopnea, palpitations, paroxysmal nocturnal dyspnea and syncope.   Respiratory: Negative for shortness of breath.    Skin: Negative.    Musculoskeletal: Negative.    Gastrointestinal: Negative for abdominal pain, constipation and diarrhea.   Genitourinary: Negative for dysuria.   Neurological: Negative for dizziness.   Psychiatric/Behavioral: Negative.         Objective:    Physical Exam   Constitutional: He is oriented to person, place, and time. He appears well-developed and well-nourished. No distress.   HENT:   Head: Normocephalic and  atraumatic.   Eyes: Conjunctivae and EOM are normal. Pupils are equal, round, and reactive to light.   Neck: Normal range of motion. Neck supple. No thyromegaly present.   Cardiovascular: Normal rate, regular rhythm and normal heart sounds.   No murmur heard.  Pulmonary/Chest: Effort normal and breath sounds normal. No respiratory distress. He has no wheezes. He has no rales. He exhibits no tenderness.   Abdominal: Soft. Bowel sounds are normal.   Musculoskeletal: He exhibits no edema.   Neurological: He is alert and oriented to person, place, and time.   Skin: Skin is warm and dry.   Psychiatric: He has a normal mood and affect. His behavior is normal.       NST:  9-18  Impression: NORMAL MYOCARDIAL PERFUSION  1. The perfusion scan is free of evidence for myocardial ischemia or injury.   2. Resting wall motion is physiologic.   3. Resting LV function is normal.   4. The ventricular volumes are normal at rest and stress.   5. The extracardiac distribution of radioactivity is normal.   6. When compared to the previous study from 09/12/2016, no change noted.    Echo:  5-18  CONCLUSIONS     1 - Normal left ventricular systolic function (EF 55-60%).     2 - Concentric remodeling.     3 - Indeterminate LV diastolic function.       Assessment:       1. Chest pain, unspecified type    2. History of hepatitis C    3. Mixed hyperlipidemia    4. Atherosclerosis of aorta         Plan:       -mainly reassurance in light of testing  -Continue risk factor modification i.e. Diet and exercise  -will be cleared for urological procedure at low risk  -check labs in particular lipid profile    Return to clinic in 6 months             none

## 2023-10-27 DIAGNOSIS — G43.909 MIGRAINE WITHOUT STATUS MIGRAINOSUS, NOT INTRACTABLE, UNSPECIFIED MIGRAINE TYPE: ICD-10-CM

## 2023-10-27 RX ORDER — CEFUROXIME AXETIL 250 MG/1
TABLET ORAL
Qty: 1 KIT | Refills: 6 | Status: SHIPPED | OUTPATIENT
Start: 2023-10-27

## 2023-10-27 NOTE — TELEPHONE ENCOUNTER
No care due was identified.  [facility] Embedded Care Due Messages. Reference number: [id number].   10/27/2023 8:30:03 AM CDT

## 2023-11-16 LAB — CRC RECOMMENDATION EXT: NORMAL

## 2023-12-01 ENCOUNTER — OFFICE VISIT (OUTPATIENT)
Dept: FAMILY MEDICINE | Facility: CLINIC | Age: 73
End: 2023-12-01
Payer: MEDICARE

## 2023-12-01 ENCOUNTER — PATIENT OUTREACH (OUTPATIENT)
Dept: ADMINISTRATIVE | Facility: HOSPITAL | Age: 73
End: 2023-12-01
Payer: MEDICARE

## 2023-12-01 VITALS
WEIGHT: 207.88 LBS | HEIGHT: 67 IN | BODY MASS INDEX: 32.63 KG/M2 | TEMPERATURE: 98 F | SYSTOLIC BLOOD PRESSURE: 119 MMHG | DIASTOLIC BLOOD PRESSURE: 80 MMHG | OXYGEN SATURATION: 97 % | HEART RATE: 82 BPM

## 2023-12-01 DIAGNOSIS — R73.03 PREDIABETES: ICD-10-CM

## 2023-12-01 DIAGNOSIS — E78.2 MIXED HYPERLIPIDEMIA: Primary | ICD-10-CM

## 2023-12-01 DIAGNOSIS — Z00.00 ROUTINE MEDICAL EXAM: ICD-10-CM

## 2023-12-01 DIAGNOSIS — I70.0 ATHEROSCLEROSIS OF AORTA: ICD-10-CM

## 2023-12-01 DIAGNOSIS — R79.89 LOW SERUM TESTOSTERONE LEVEL: ICD-10-CM

## 2023-12-01 PROCEDURE — 3079F PR MOST RECENT DIASTOLIC BLOOD PRESSURE 80-89 MM HG: ICD-10-PCS | Mod: HCNC,CPTII,S$GLB,

## 2023-12-01 PROCEDURE — 99999 PR PBB SHADOW E&M-EST. PATIENT-LVL V: CPT | Mod: PBBFAC,HCNC,,

## 2023-12-01 PROCEDURE — 1159F PR MEDICATION LIST DOCUMENTED IN MEDICAL RECORD: ICD-10-PCS | Mod: HCNC,CPTII,S$GLB,

## 2023-12-01 PROCEDURE — 1160F RVW MEDS BY RX/DR IN RCRD: CPT | Mod: HCNC,CPTII,S$GLB,

## 2023-12-01 PROCEDURE — 3008F BODY MASS INDEX DOCD: CPT | Mod: HCNC,CPTII,S$GLB,

## 2023-12-01 PROCEDURE — 3074F SYST BP LT 130 MM HG: CPT | Mod: HCNC,CPTII,S$GLB,

## 2023-12-01 PROCEDURE — 99999 PR PBB SHADOW E&M-EST. PATIENT-LVL V: ICD-10-PCS | Mod: PBBFAC,HCNC,,

## 2023-12-01 PROCEDURE — 1160F PR REVIEW ALL MEDS BY PRESCRIBER/CLIN PHARMACIST DOCUMENTED: ICD-10-PCS | Mod: HCNC,CPTII,S$GLB,

## 2023-12-01 PROCEDURE — 3288F PR FALLS RISK ASSESSMENT DOCUMENTED: ICD-10-PCS | Mod: HCNC,CPTII,S$GLB,

## 2023-12-01 PROCEDURE — 3008F PR BODY MASS INDEX (BMI) DOCUMENTED: ICD-10-PCS | Mod: HCNC,CPTII,S$GLB,

## 2023-12-01 PROCEDURE — 3074F PR MOST RECENT SYSTOLIC BLOOD PRESSURE < 130 MM HG: ICD-10-PCS | Mod: HCNC,CPTII,S$GLB,

## 2023-12-01 PROCEDURE — 1125F AMNT PAIN NOTED PAIN PRSNT: CPT | Mod: HCNC,CPTII,S$GLB,

## 2023-12-01 PROCEDURE — 1125F PR PAIN SEVERITY QUANTIFIED, PAIN PRESENT: ICD-10-PCS | Mod: HCNC,CPTII,S$GLB,

## 2023-12-01 PROCEDURE — 3079F DIAST BP 80-89 MM HG: CPT | Mod: HCNC,CPTII,S$GLB,

## 2023-12-01 PROCEDURE — 3288F FALL RISK ASSESSMENT DOCD: CPT | Mod: HCNC,CPTII,S$GLB,

## 2023-12-01 PROCEDURE — 1101F PR PT FALLS ASSESS DOC 0-1 FALLS W/OUT INJ PAST YR: ICD-10-PCS | Mod: HCNC,CPTII,S$GLB,

## 2023-12-01 PROCEDURE — 1101F PT FALLS ASSESS-DOCD LE1/YR: CPT | Mod: HCNC,CPTII,S$GLB,

## 2023-12-01 PROCEDURE — 99397 PER PM REEVAL EST PAT 65+ YR: CPT | Mod: HCNC,S$GLB,,

## 2023-12-01 PROCEDURE — 99397 PR PREVENTIVE VISIT,EST,65 & OVER: ICD-10-PCS | Mod: HCNC,S$GLB,,

## 2023-12-01 PROCEDURE — 1159F MED LIST DOCD IN RCRD: CPT | Mod: HCNC,CPTII,S$GLB,

## 2023-12-01 NOTE — ASSESSMENT & PLAN NOTE
Stable on statin therapy. Will monitor lipid panel. The current medical regimen is effective;  continue present plan and medications.

## 2023-12-01 NOTE — ASSESSMENT & PLAN NOTE
The current medical regimen is effective;  continue present plan and medications.    Statin. Labs today. Heart healthy diet

## 2023-12-01 NOTE — PROGRESS NOTES
"  [facility]     Chief Complaint:  Chief Complaint   Patient presents with    Annual Exam       Tommy Keith is a 73 y.o. male with multiple medical diagnoses as listed in the medical history and problem list that presents for annual.  Pt is new to me     HPI    Doing well. Receives a lot of his care at [facility] because his wife works for them.     HLD: compliant with meds.   Migraine-imitrex helping but has been getting more lately. Getting migraines weekly. Unsure of triggers. Sometimes thinks its chocolate. Think migraines are better when he is meal prepping and going to gym.     Leg and back pain since feb of 2023 after going to massage place. 2 injections didn't work. Hasn't been to gym lately but needs to start going back. Dr. Rojo ([facility]) back and spine.     Gets testosterone injections with urology (carrier).     Social Factors  Tobacco use: No  Ready to Quit: n/a  Alcohol: No  Intimate partner violence screening  "Do you feel safe in your current relationship?" Yes lives with wife, grandson and daughter.   "Have you ever been in a relationship in which your partner frightened you or hurt you?" No  Living Will/POA: No  Regular Exercise: Yes     Depression  Over the past two weeks, have you felt down, depressed, or hopeless? No  Over the past two weeks, have you felt little interest or pleasure in doing things? No    Reproductive Health  STD screening in last year: declines  HIV screening: reviewed      Screen for Chronic Disease  CHD Risk Factors: dyslipidemia and hypertension  Estimated body mass index is 32.56 kg/m² as calculated from the following:    Height as of this encounter: 5' 7" (1.702 m).    Weight as of this encounter: 94.3 kg (207 lb 14.3 oz).  Dyslipidemia screening needed: order placed  T2DM screening needed: order placed  Colonoscopy needed: order placed, last colonoscopy 2018  with [facility]. Pt states just had colonoscopy 11/16 of this year. Dr. Bailey, [address].   PSA needed: " reviewed WNL 6/21/2023  AAA screening needed:reviewed 2018  Screen men 35 years and older, and men 20 to 34 years of age who have cardiovascular risk factors for dyslipidemia  Begin screening colonoscopies at 50 years of age in men of average risk, and continue until 75 years of age; offer fecal occult blood testing every year, flexible sigmoidoscopy every five years combined with fecal occult blood testing every three years, or colonoscopy every 10 years   The American Urological Association recommends offering PSA testing and digital rectal examination to well-informed men beginning at 40 years of age and continuing until life expectancy is less than 10 years  Screen once with ultrasonography in men 65 to 75 years of age if they have a family history or have smoked at [id number] cigarettes in their lifetime  Screen men with a sustained blood pressure greater than 135/80 mm Hg for T2DM    Other concerns below     Assessment & Plan       Problem List Items Addressed This Visit          Cardiac/Vascular    Mixed hyperlipidemia - Primary    Current Assessment & Plan     The current medical regimen is effective;  continue present plan and medications.    Statin. Labs today. Heart healthy diet         Relevant Orders    TSH    Hemoglobin A1C    Lipid Panel    Atherosclerosis of aorta    Overview     US Abdominal AortaPerformed 1/16/2018  Patient with Atherosclerosis of the Aorta.  Stable/asymptomatic. Currently stable on lipid lowering treatment and b/p monitoring.           Current Assessment & Plan     Stable on statin therapy. Will monitor lipid panel. The current medical regimen is effective;  continue present plan and medications.           Relevant Orders    TSH    Hemoglobin A1C    Lipid Panel       Endocrine    Low serum testosterone level    Current Assessment & Plan     Managed by roniyPrabhakar.           Other Visit Diagnoses       Routine medical exam      Counseled on age appropriate medical preventative  services including age appropriate cancer screenings, age appropriate eye and dental exams, over all nutritional health, need for a consistent exercise regimen, and an over all push towards maintaining a vigorous and active lifestyle.  Counseled on age appropriate vaccines and discussed upcoming health care needs based on age/gender. Discussed good sleep hygiene and stress management.      Relevant Orders    TSH    Hemoglobin A1C    Lipid Panel    Comprehensive Metabolic Panel    CBC Auto Differential    Prediabetes        Relevant Orders    TSH    Hemoglobin A1C    Comprehensive Metabolic Panel    CBC Auto Differential            --------------------------------------------      Health Maintenance:  Health Maintenance         Date Due Completion Date    RSV Vaccine (Age 60+ and Pregnant patients) (1 - 1-dose 60+ series) Never done ---    Colorectal Cancer Screening 04/20/2023 4/20/2018    COVID-19 Vaccine (3 - 2023-24 season) 09/01/2023 8/20/2021    Hemoglobin A1c (Prediabetes) 10/13/2023 10/13/2022    High Dose Statin 04/06/2024 4/6/2023    Lipid Panel 10/13/2027 10/13/2022    TETANUS VACCINE 06/29/2031 6/29/2021            Health maintenance reviewed, Lipid panel ordered, and A1c ordered    Follow Up:  Follow up in about 1 year (around 12/1/2024) for appt with PCP.    Discussed DDx, condition, and treatment.   Education sent to patient portal/included in after visit summary.  ED precautions given.   Notify provider if symptoms do not resolve or increase in severity.   Patient verbalizes understanding and agrees with plan of care.      Exam     Review of Systems:  (as noted above)  Review of Systems   Constitutional:  Negative for activity change and appetite change.   Gastrointestinal:  Negative for abdominal pain.   Genitourinary:  Negative for dysuria and frequency.   Musculoskeletal:  Positive for back pain.   Neurological:  Negative for dizziness and light-headedness.       Physical Exam:   Physical  "Exam  Vitals reviewed.   Constitutional:       General: He is not in acute distress.     Appearance: Normal appearance. He is obese. He is not toxic-appearing.   HENT:      Head: Normocephalic and atraumatic.   Cardiovascular:      Rate and Rhythm: Normal rate and regular rhythm.      Pulses: Normal pulses.      Heart sounds: Normal heart sounds. No murmur heard.  Pulmonary:      Effort: Pulmonary effort is normal. No respiratory distress.      Breath sounds: Normal breath sounds.   Abdominal:      General: Abdomen is flat. Bowel sounds are normal. There is no distension.      Palpations: Abdomen is soft.   Musculoskeletal:         General: Normal range of motion.      Cervical back: Normal range of motion. No rigidity.   Skin:     General: Skin is warm and dry.      Capillary Refill: Capillary refill takes less than 2 seconds.      Coloration: Skin is not jaundiced.   Neurological:      General: No focal deficit present.      Mental Status: He is alert and oriented to person, place, and time.   Psychiatric:         Mood and Affect: Mood normal.       Vitals:    12/01/23 1335   BP: 119/80   Pulse: 82   Temp: 98.1 °F (36.7 °C)   TempSrc: Oral   SpO2: 97%   Weight: 94.3 kg (207 lb 14.3 oz)   Height: 5' 7" (1.702 m)      Body mass index is 32.56 kg/m².        History     Past Medical History:  Past Medical History:   Diagnosis Date    Asthma, intermittent     irritant induced    History of BPH     s/p laser TURP    History of hepatitis C     s/p treatment, in remission    History of migraine headaches     History of multiple pulmonary nodules     Hyperlipidemia     Low serum testosterone level     Migraine headache     OA (osteoarthritis)     Obesity     Sciatica of left side     Spinal stenosis        Past Surgical History:  Past Surgical History:   Procedure Laterality Date    arthroscopy of both knees      COLONOSCOPY N/A 4/20/2018    Procedure: COLONOSCOPY;  Surgeon: Jovon Nunez MD;  Location: [facility];  Service: " Endoscopy;  Laterality: N/A;    left inguinal hernia repair      LIVER BIOPSY      tonsillectomy      TONSILLECTOMY      UMBILICAL HERNIA REPAIR      VENTRAL HERNIA REPAIR         Social History:  Social History     Socioeconomic History    Marital status:      Spouse name: Deloris    Number of children: 1   Occupational History    Occupation: works at a chemical plant    Occupation: Retired   Tobacco Use    Smoking status: Former     Current packs/day: 0.00     Types: Cigarettes     Quit date: 2006     Years since quittin.3     Passive exposure: Past    Smokeless tobacco: Never   Substance and Sexual Activity    Alcohol use: No    Drug use: No    Sexual activity: Yes     Partners: Female   Social History Narrative    He is walking regularly.       Family History:  Family History   Problem Relation Age of Onset    Heart disease Father     Clotting disorder Brother     Diabetes Brother     Hyperlipidemia Brother     Cancer Brother     Diabetes Sister     Hyperlipidemia Sister        Allergies and Medications: (updated and reviewed)  Review of patient's allergies indicates:   Allergen Reactions    Levaquin [levofloxacin]      Current Outpatient Medications   Medication Sig Dispense Refill    ASCORBIC ACID, VITAMIN C, ORAL Take by mouth.      aspirin (ECOTRIN) 81 MG EC tablet Take 81 mg by mouth once daily.      atorvastatin (LIPITOR) 80 MG tablet TAKE 1 TABLET BY MOUTH EVERY DAY IN THE EVENING 90 tablet 3    COMBIVENT RESPIMAT  mcg/actuation inhaler INHALE 2 PUFFS INTO THE LUNGS EVERY 4 (FOUR) HOURS AS NEEDED FOR WHEEZING. RESCUE 4 g 3    cyanocobalamin (VITAMIN B-12) 100 MCG tablet Take by mouth.      diclofenac sodium (VOLTAREN) 1 % Gel Apply 2 g topically 2 (two) times daily as needed (pain). 100 g 1    fluticasone propionate (FLONASE) 50 mcg/actuation nasal spray INSTILL 2 SPRAYS INTO EACH NOSTRIL DAILY AS NEEDED 48 mL 0    FLUZONE HIGHDOSE QUAD 21-22  mcg/0.7 mL Syrg       FOLIC  ACID/MULTIVIT-MIN/LUTEIN (CENTRUM SILVER ORAL) Take by mouth.      glucosamine-chondroitin 500-400 mg tablet Take 1 tablet by mouth 3 (three) times daily.      KRILL OIL ORAL Take by mouth.      NON FORMULARY MEDICATION Take by mouth.      omega-3 fatty acids/fish oil (FISH OIL-OMEGA-3 FATTY ACIDS) 300-1,000 mg capsule Take 1 capsule by mouth once daily.      sumatriptan (IMITREX STATDOSE) 6 mg/0.5 mL kit USE AS DIRECTED MAY REPEAT IN 1 HR MAX 2 DOSES PER 24 HOURS 1 kit 6    testosterone cypionate (DEPOTESTOTERONE CYPIONATE) 200 mg/mL injection Inject 200 mg into the muscle every 14 (fourteen) days.  3    topiramate (TOPAMAX) 25 MG tablet TAKE 3 TABLETS (75 MG TOTAL) BY MOUTH EVERY EVENING. 270 tablet 1    topiramate (TOPAMAX) 25 MG tablet Take 75 mg by mouth.      vitamin E, dl, acetate, 22.5 mg (50 unit)/mL Drop Take by mouth.       No current facility-administered medications for this visit.       Patient Care Team:  Angelica Gomez MD as PCP - General (Family Medicine)  TulsaLeonela LPN as Licensed Practical Nurse         - The patient is given an After Visit Summary that lists all medications with directions, allergies, education, orders placed during this encounter and follow-up instructions.      - I have reviewed the patient's medical information including past medical, family, and social history sections including the medications and allergies.      - We discussed the patient's current medications.     This note was created by combination of typed  and MModal dictation.  Transcription errors may be present.  If there are any questions, please contact me.

## 2023-12-01 NOTE — Clinical Note
Hey! I'm back! Can you find Mr. Holley's colonoscopy record. Completed 11/16 with Dr. Bailey, [address].

## 2023-12-05 ENCOUNTER — LAB VISIT (OUTPATIENT)
Dept: LAB | Facility: HOSPITAL | Age: 73
End: 2023-12-05
Attending: FAMILY MEDICINE
Payer: MEDICARE

## 2023-12-05 DIAGNOSIS — E78.2 MIXED HYPERLIPIDEMIA: ICD-10-CM

## 2023-12-05 DIAGNOSIS — R73.03 PREDIABETES: ICD-10-CM

## 2023-12-05 DIAGNOSIS — Z00.00 ROUTINE MEDICAL EXAM: ICD-10-CM

## 2023-12-05 DIAGNOSIS — I70.0 ATHEROSCLEROSIS OF AORTA: ICD-10-CM

## 2023-12-05 LAB
ALBUMIN SERPL BCP-MCNC: 4.2 G/DL (ref 3.5–5.2)
ALP SERPL-CCNC: 94 U/L (ref 55–135)
ALT SERPL W/O P-5'-P-CCNC: 59 U/L (ref 10–44)
ANION GAP SERPL CALC-SCNC: 8 MMOL/L (ref 8–16)
AST SERPL-CCNC: 40 U/L (ref 10–40)
BASOPHILS # BLD AUTO: 0.05 K/UL (ref 0–0.2)
BASOPHILS NFR BLD: 0.7 % (ref 0–1.9)
BILIRUB SERPL-MCNC: 1.1 MG/DL (ref 0.1–1)
BUN SERPL-MCNC: 26 MG/DL (ref 8–23)
CALCIUM SERPL-MCNC: 10.1 MG/DL (ref 8.7–10.5)
CHLORIDE SERPL-SCNC: 112 MMOL/L (ref 95–110)
CHOLEST SERPL-MCNC: 216 MG/DL (ref 120–199)
CHOLEST/HDLC SERPL: 6.4 {RATIO} (ref 2–5)
CO2 SERPL-SCNC: 21 MMOL/L (ref 23–29)
CREAT SERPL-MCNC: 0.9 MG/DL (ref 0.5–1.4)
DIFFERENTIAL METHOD: ABNORMAL
EOSINOPHIL # BLD AUTO: 0.1 K/UL (ref 0–0.5)
EOSINOPHIL NFR BLD: 1.5 % (ref 0–8)
ERYTHROCYTE [DISTWIDTH] IN BLOOD BY AUTOMATED COUNT: 12.9 % (ref 11.5–14.5)
EST. GFR  (NO RACE VARIABLE): >60 ML/MIN/1.73 M^2
ESTIMATED AVG GLUCOSE: 105 MG/DL (ref 68–131)
GLUCOSE SERPL-MCNC: 101 MG/DL (ref 70–110)
HBA1C MFR BLD: 5.3 % (ref 4–5.6)
HCT VFR BLD AUTO: 45.9 % (ref 40–54)
HDLC SERPL-MCNC: 34 MG/DL (ref 40–75)
HDLC SERPL: 15.7 % (ref 20–50)
HGB BLD-MCNC: 15.8 G/DL (ref 14–18)
IMM GRANULOCYTES # BLD AUTO: 0.02 K/UL (ref 0–0.04)
IMM GRANULOCYTES NFR BLD AUTO: 0.3 % (ref 0–0.5)
LDLC SERPL CALC-MCNC: 163.6 MG/DL (ref 63–159)
LYMPHOCYTES # BLD AUTO: 1.5 K/UL (ref 1–4.8)
LYMPHOCYTES NFR BLD: 20.4 % (ref 18–48)
MCH RBC QN AUTO: 31.4 PG (ref 27–31)
MCHC RBC AUTO-ENTMCNC: 34.4 G/DL (ref 32–36)
MCV RBC AUTO: 91 FL (ref 82–98)
MONOCYTES # BLD AUTO: 0.6 K/UL (ref 0.3–1)
MONOCYTES NFR BLD: 8.7 % (ref 4–15)
NEUTROPHILS # BLD AUTO: 4.9 K/UL (ref 1.8–7.7)
NEUTROPHILS NFR BLD: 68.4 % (ref 38–73)
NONHDLC SERPL-MCNC: 182 MG/DL
NRBC BLD-RTO: 0 /100 WBC
PLATELET # BLD AUTO: 235 K/UL (ref 150–450)
PMV BLD AUTO: 10.3 FL (ref 9.2–12.9)
POTASSIUM SERPL-SCNC: 4.1 MMOL/L (ref 3.5–5.1)
PROT SERPL-MCNC: 7.5 G/DL (ref 6–8.4)
RBC # BLD AUTO: 5.03 M/UL (ref 4.6–6.2)
SODIUM SERPL-SCNC: 141 MMOL/L (ref 136–145)
TRIGL SERPL-MCNC: 92 MG/DL (ref 30–150)
TSH SERPL DL<=0.005 MIU/L-ACNC: 1.38 UIU/ML (ref 0.4–4)
WBC # BLD AUTO: 7.21 K/UL (ref 3.9–12.7)

## 2023-12-05 PROCEDURE — 84443 ASSAY THYROID STIM HORMONE: CPT | Mod: HCNC

## 2023-12-05 PROCEDURE — 80061 LIPID PANEL: CPT | Mod: HCNC

## 2023-12-05 PROCEDURE — 36415 COLL VENOUS BLD VENIPUNCTURE: CPT | Mod: HCNC,PO

## 2023-12-05 PROCEDURE — 85025 COMPLETE CBC W/AUTO DIFF WBC: CPT | Mod: HCNC

## 2023-12-05 PROCEDURE — 80053 COMPREHEN METABOLIC PANEL: CPT | Mod: HCNC

## 2023-12-05 PROCEDURE — 83036 HEMOGLOBIN GLYCOSYLATED A1C: CPT | Mod: HCNC

## 2023-12-06 ENCOUNTER — PATIENT OUTREACH (OUTPATIENT)
Dept: ADMINISTRATIVE | Facility: HOSPITAL | Age: 73
End: 2023-12-06
Payer: MEDICARE

## 2023-12-06 ENCOUNTER — TELEPHONE (OUTPATIENT)
Dept: FAMILY MEDICINE | Facility: CLINIC | Age: 73
End: 2023-12-06
Payer: MEDICARE

## 2023-12-06 NOTE — TELEPHONE ENCOUNTER
The 10-year ASCVD risk score (Lesli NICHOLS, et al., 2019) is: 20.8%    Values used to calculate the score:      Age: 73 years      Sex: Male      Is Non- : No      Diabetic: No      Tobacco smoker: No      Systolic Blood Pressure: 119 mmHg      Is BP treated: No      HDL Cholesterol: 34 mg/dL      Total Cholesterol: 216 mg/dL      Cholesterol: Continue Lipitor 80mg. Add omega 3. Guidelines: Heart Healthy Diet (AHA Guidelines)    -Aerobic Exercise for at least 30 minutes on most days should accompany dietary changes  -Eat smaller portions   -Eat at home.   -Increase Omega-3 Fatty Acid intake  -Eat fish twice weekly or Supplement with Omega-3 Fatty Acids  -Increase fruit and vegetable intake to drop CAD risk  -Fruits and Vegetables with greatest risk reduction  -Green leafy vegetables  -Cruciferous vegetables (broccoli, cauliflower)  -Fruits and vegetables high in Vitamin C  -Increase whole grains and high Dietary Fiber foods    LIMIT:   -saturated fat and trans-Fatty Acid intake (replace with olive oil, canola oil, nuts)  -Alcohol intake (2 drinks per day for men, and 1 drink per day for women)  -sugar intake  -salt intake (<1.5 to 2 grams per day is optimal)    Kidney function  Liver enzymes are a little elevated. This can be due to increase weight, poor diet, alcohol or taking tylenol  Please remember to exercise  No diabetes  Normal thyroid

## 2024-01-27 ENCOUNTER — OFFICE VISIT (OUTPATIENT)
Dept: URGENT CARE | Facility: CLINIC | Age: 74
End: 2024-01-27
Payer: MEDICARE

## 2024-01-27 VITALS
SYSTOLIC BLOOD PRESSURE: 127 MMHG | WEIGHT: 200 LBS | DIASTOLIC BLOOD PRESSURE: 83 MMHG | HEART RATE: 83 BPM | OXYGEN SATURATION: 97 % | BODY MASS INDEX: 31.39 KG/M2 | HEIGHT: 67 IN | TEMPERATURE: 98 F | RESPIRATION RATE: 18 BRPM

## 2024-01-27 DIAGNOSIS — R05.9 COUGH, UNSPECIFIED TYPE: Primary | ICD-10-CM

## 2024-01-27 DIAGNOSIS — B34.9 ACUTE VIRAL SYNDROME: ICD-10-CM

## 2024-01-27 DIAGNOSIS — J31.0 CHRONIC RHINITIS: ICD-10-CM

## 2024-01-27 LAB
CTP QC/QA: YES
CTP QC/QA: YES
POC MOLECULAR INFLUENZA A AGN: NEGATIVE
POC MOLECULAR INFLUENZA B AGN: NEGATIVE
SARS-COV-2 AG RESP QL IA.RAPID: NEGATIVE

## 2024-01-27 PROCEDURE — 87811 SARS-COV-2 COVID19 W/OPTIC: CPT | Mod: QW,S$GLB,, | Performed by: NURSE PRACTITIONER

## 2024-01-27 PROCEDURE — 99214 OFFICE O/P EST MOD 30 MIN: CPT | Mod: S$GLB,,, | Performed by: NURSE PRACTITIONER

## 2024-01-27 PROCEDURE — 87502 INFLUENZA DNA AMP PROBE: CPT | Mod: QW,S$GLB,, | Performed by: NURSE PRACTITIONER

## 2024-01-27 RX ORDER — FLUTICASONE PROPIONATE 50 MCG
2 SPRAY, SUSPENSION (ML) NASAL DAILY
Qty: 48 ML | Refills: 1 | Status: SHIPPED | OUTPATIENT
Start: 2024-01-27

## 2024-01-27 RX ORDER — AZELASTINE 1 MG/ML
1 SPRAY, METERED NASAL 2 TIMES DAILY
Qty: 30 ML | Refills: 1 | Status: SHIPPED | OUTPATIENT
Start: 2024-01-27 | End: 2025-01-26

## 2024-01-27 RX ORDER — BENZONATATE 200 MG/1
200 CAPSULE ORAL 3 TIMES DAILY PRN
Qty: 30 CAPSULE | Refills: 1 | Status: CANCELLED | OUTPATIENT
Start: 2024-01-27 | End: 2024-02-16

## 2024-01-27 RX ORDER — PROMETHAZINE HYDROCHLORIDE AND DEXTROMETHORPHAN HYDROBROMIDE 6.25; 15 MG/5ML; MG/5ML
5 SYRUP ORAL EVERY 8 HOURS PRN
Qty: 180 ML | Refills: 1 | Status: SHIPPED | OUTPATIENT
Start: 2024-01-27 | End: 2024-04-08

## 2024-01-27 RX ORDER — PROMETHAZINE HYDROCHLORIDE AND DEXTROMETHORPHAN HYDROBROMIDE 6.25; 15 MG/5ML; MG/5ML
5 SYRUP ORAL EVERY 6 HOURS PRN
Qty: 180 ML | Refills: 1 | Status: CANCELLED | OUTPATIENT
Start: 2024-01-27 | End: 2024-02-14

## 2024-01-27 RX ORDER — BENZONATATE 200 MG/1
200 CAPSULE ORAL 3 TIMES DAILY PRN
Qty: 30 CAPSULE | Refills: 1 | Status: SHIPPED | OUTPATIENT
Start: 2024-01-27 | End: 2024-02-16

## 2024-01-27 NOTE — PROGRESS NOTES
"Subjective:      Patient ID: Tommy Keith is a 73 y.o. male.    Vitals:  height is 5' 7" (1.702 m) and weight is 90.7 kg (200 lb). His oral temperature is 98 °F (36.7 °C). His blood pressure is 127/83 and his pulse is 83. His respiration is 18 and oxygen saturation is 97%.     Chief Complaint: Cough    Sore throat, cough, chills, body aches, headaches, . Pt symp started 2 days ago. Pt has been taking dayquil with minimum relief but none today. Last night coughing and aches became worse. Non-smoker.     Cough  This is a new problem. Episode onset: 2 days ago. The problem has been unchanged. The problem occurs constantly. The cough is Productive of sputum (yellow green). Associated symptoms include chills, headaches, myalgias, nasal congestion, postnasal drip, a sore throat and sweats. Pertinent negatives include no chest pain, ear congestion, ear pain, fever, heartburn, hemoptysis, rash, rhinorrhea, shortness of breath, weight loss or wheezing. Nothing aggravates the symptoms. He has tried OTC cough suppressant for the symptoms. The treatment provided no relief.     Constitution: Positive for chills. Negative for activity change, appetite change and fever.   HENT:  Positive for postnasal drip and sore throat. Negative for ear pain, sinus pain and sinus pressure.    Cardiovascular:  Negative for chest pain.   Respiratory:  Positive for cough. Negative for bloody sputum, shortness of breath and wheezing.    Gastrointestinal:  Negative for heartburn.   Musculoskeletal:  Positive for muscle ache.   Skin:  Negative for rash.   Neurological:  Positive for headaches.      Objective:     Physical Exam   Constitutional: He is oriented to person, place, and time. He appears well-developed. He is cooperative.  Non-toxic appearance. He does not appear ill. No distress.   HENT:   Head: Normocephalic and atraumatic.   Ears:   Right Ear: Hearing, tympanic membrane, external ear and ear canal normal.   Left Ear: Hearing, " tympanic membrane, external ear and ear canal normal.   Nose: Nose normal. No mucosal edema, rhinorrhea or nasal deformity. No epistaxis. Right sinus exhibits no maxillary sinus tenderness and no frontal sinus tenderness. Left sinus exhibits no maxillary sinus tenderness and no frontal sinus tenderness.   Mouth/Throat: Uvula is midline and mucous membranes are normal. No trismus in the jaw. Normal dentition. No uvula swelling. Posterior oropharyngeal erythema present. No oropharyngeal exudate or posterior oropharyngeal edema.   Eyes: Conjunctivae and lids are normal. No scleral icterus.   Neck: Trachea normal and phonation normal. Neck supple. No edema present. No erythema present. No neck rigidity present.   Cardiovascular: Normal rate, regular rhythm, normal heart sounds and normal pulses.   Pulmonary/Chest: Effort normal and breath sounds normal. No respiratory distress. He has no decreased breath sounds. He has no rhonchi.   Abdominal: Normal appearance.   Musculoskeletal: Normal range of motion.         General: No deformity. Normal range of motion.   Neurological: He is alert and oriented to person, place, and time. He exhibits normal muscle tone. Coordination normal.   Skin: Skin is warm, dry, intact, not diaphoretic and not pale.   Psychiatric: His speech is normal and behavior is normal. Judgment and thought content normal.   Nursing note and vitals reviewed.    Results for orders placed or performed in visit on 01/27/24   SARS Coronavirus 2 Antigen, POCT Manual Read   Result Value Ref Range    SARS Coronavirus 2 Antigen Negative Negative     Acceptable Yes    POCT Influenza A/B MOLECULAR   Result Value Ref Range    POC Molecular Influenza A Ag Negative Negative, Not Reported    POC Molecular Influenza B Ag Negative Negative, Not Reported     Acceptable Yes        Assessment:     1. Cough, unspecified type    2. Acute viral syndrome    3. Chronic rhinitis        Plan:        Cough, unspecified type  -     SARS Coronavirus 2 Antigen, POCT Manual Read  -     POCT Influenza A/B MOLECULAR  -     promethazine-dextromethorphan (PROMETHAZINE-DM) 6.25-15 mg/5 mL Syrp; Take 5 mLs by mouth every 8 (eight) hours as needed (cough).  Dispense: 180 mL; Refill: 1  -     benzonatate (TESSALON) 200 MG capsule; Take 1 capsule (200 mg total) by mouth 3 (three) times daily as needed for Cough.  Dispense: 30 capsule; Refill: 1    Acute viral syndrome  -     azelastine (ASTELIN) 137 mcg (0.1 %) nasal spray; 1 spray (137 mcg total) by Nasal route 2 (two) times daily.  Dispense: 30 mL; Refill: 1    Chronic rhinitis  -     fluticasone propionate (FLONASE) 50 mcg/actuation nasal spray; 2 sprays (100 mcg total) by Each Nostril route once daily.  Dispense: 48 mL; Refill: 1      Patient Instructions   Flonase nasal spray daily and Astelin 2 times daily for the next 3-5 days.  Antihistamine pill daily (Claritin, zyrtec, allegra, or xyzal)  Saline nasal rinses daily if tolerated.  Plain mucinex every 4 hours with a full bottle water for at least the next 24 hours then as needed for congestion.  Alternate tylenol and motrin/ibuprofen every 4-6 hours for the next 24-48 hours for fever.  Increase water/propel intake.  Follow up with your PCP in the next 5-7 days or sooner to urgent care/ED if symptoms worsen or fail to improve.     Cough   If your condition worsens or fails to improve we recommend that you receive another evaluation at the ER immediately or contact your PCP to discuss your concerns or return here. You must understand that you've received an urgent care treatment only and that you may be released before all your medical problems are known or treated. You the patient will arrange for follouwp care as instructed. .  Rest and fluids are important  Can use honey with dolores to soothe your throat  Take prescription cough meds (pills) as prescribed; take prescription cough syrup at night as needed for  "cough.  Do not take both the prescribed cough pills and syrup at the same time or within 6 hours of each other.  Do not take the cough syrup with any other sedative medication as it can can cause drowsiness. Do not operate any heavy machinery, drink or drive while taking the cough syrup.   -  Flonase (fluticasone) is a nasal spray which is available over the counter and may help with your symptoms.   -  If you have hypertension avoid using the "D" which is the decongestant.  Instead you can use Coricidin HBP for cold and cough symptoms.    -  If you just have drainage you can take plain Zyrtec, Claritin or Allegra   -  Tylenol or ibuprofen can also be used as directed for pain unless you have an allergy to them or medical condition such as stomach ulcers, kidney or liver disease or blood thinners etc for which you should not be taking these type of medications.   Please follow up with your primary care doctor or specialist in the next 48-72hrs as needed and if no improvement  If you  smoke, please stop smoking.     Patient education provided.  All questions and concerns addressed  RTC PRN and if symptoms worsen or fail to improve  Patient verbalizes understanding                   "

## 2024-01-27 NOTE — PATIENT INSTRUCTIONS
"Flonase nasal spray daily and Astelin 2 times daily for the next 3-5 days.  Antihistamine pill daily (Claritin, zyrtec, allegra, or xyzal)  Saline nasal rinses daily if tolerated.  Plain mucinex every 4 hours with a full bottle water for at least the next 24 hours then as needed for congestion.  Alternate tylenol and motrin/ibuprofen every 4-6 hours for the next 24-48 hours for fever.  Increase water/propel intake.  Follow up with your PCP in the next 5-7 days or sooner to urgent care/ED if symptoms worsen or fail to improve.     Cough   If your condition worsens or fails to improve we recommend that you receive another evaluation at the ER immediately or contact your PCP to discuss your concerns or return here. You must understand that you've received an urgent care treatment only and that you may be released before all your medical problems are known or treated. You the patient will arrange for follouwp care as instructed. .  Rest and fluids are important  Can use honey with dolores to soothe your throat  Take prescription cough meds (pills) as prescribed; take prescription cough syrup at night as needed for cough.  Do not take both the prescribed cough pills and syrup at the same time or within 6 hours of each other.  Do not take the cough syrup with any other sedative medication as it can can cause drowsiness. Do not operate any heavy machinery, drink or drive while taking the cough syrup.   -  Flonase (fluticasone) is a nasal spray which is available over the counter and may help with your symptoms.   -  If you have hypertension avoid using the "D" which is the decongestant.  Instead you can use Coricidin HBP for cold and cough symptoms.    -  If you just have drainage you can take plain Zyrtec, Claritin or Allegra   -  Tylenol or ibuprofen can also be used as directed for pain unless you have an allergy to them or medical condition such as stomach ulcers, kidney or liver disease or blood thinners etc for which " you should not be taking these type of medications.   Please follow up with your primary care doctor or specialist in the next 48-72hrs as needed and if no improvement  If you  smoke, please stop smoking.

## 2024-01-31 ENCOUNTER — TELEPHONE (OUTPATIENT)
Dept: FAMILY MEDICINE | Facility: CLINIC | Age: 74
End: 2024-01-31
Payer: MEDICARE

## 2024-01-31 NOTE — TELEPHONE ENCOUNTER
----- Message from Delilah Valencia sent at 1/31/2024  9:26 AM CST -----  Regarding: [id number]  Type: Patient Call Back    Who called:self     What is the request in detail: pt states he went to the urgent care over the weekend, will like a call back from the nurse     Can the clinic reply by [facility]?no     Would the patient rather a call back or a response via [facility]? Call back     Best call back number:[phone]       Additional Information:

## 2024-01-31 NOTE — TELEPHONE ENCOUNTER
Spoke with patient went to urgent care for cough and body aches 4 days ago, tested negative for Flu and Covid. Received medications taking all prescribed meds not feeling any better. Told to follow up with PCP office if not feeling any better in a few days. Patient scheduled to see NP Lizz tomorrow at this time. Patient verbalized understanding at this time.

## 2024-02-01 ENCOUNTER — OFFICE VISIT (OUTPATIENT)
Dept: FAMILY MEDICINE | Facility: CLINIC | Age: 74
End: 2024-02-01
Payer: MEDICARE

## 2024-02-01 VITALS
OXYGEN SATURATION: 97 % | HEART RATE: 90 BPM | DIASTOLIC BLOOD PRESSURE: 88 MMHG | WEIGHT: 208.31 LBS | BODY MASS INDEX: 32.7 KG/M2 | TEMPERATURE: 98 F | SYSTOLIC BLOOD PRESSURE: 132 MMHG | HEIGHT: 67 IN

## 2024-02-01 DIAGNOSIS — I70.0 ATHEROSCLEROSIS OF AORTA: ICD-10-CM

## 2024-02-01 DIAGNOSIS — J98.01 BRONCHOSPASM: Primary | ICD-10-CM

## 2024-02-01 DIAGNOSIS — J47.9 BRONCHIECTASIS WITHOUT COMPLICATION: ICD-10-CM

## 2024-02-01 PROCEDURE — 3288F FALL RISK ASSESSMENT DOCD: CPT | Mod: HCNC,CPTII,S$GLB,

## 2024-02-01 PROCEDURE — 99214 OFFICE O/P EST MOD 30 MIN: CPT | Mod: HCNC,S$GLB,,

## 2024-02-01 PROCEDURE — 1126F AMNT PAIN NOTED NONE PRSNT: CPT | Mod: HCNC,CPTII,S$GLB,

## 2024-02-01 PROCEDURE — 3075F SYST BP GE 130 - 139MM HG: CPT | Mod: HCNC,CPTII,S$GLB,

## 2024-02-01 PROCEDURE — 1101F PT FALLS ASSESS-DOCD LE1/YR: CPT | Mod: HCNC,CPTII,S$GLB,

## 2024-02-01 PROCEDURE — 1159F MED LIST DOCD IN RCRD: CPT | Mod: HCNC,CPTII,S$GLB,

## 2024-02-01 PROCEDURE — 3079F DIAST BP 80-89 MM HG: CPT | Mod: HCNC,CPTII,S$GLB,

## 2024-02-01 PROCEDURE — 3008F BODY MASS INDEX DOCD: CPT | Mod: HCNC,CPTII,S$GLB,

## 2024-02-01 PROCEDURE — 99999 PR PBB SHADOW E&M-EST. PATIENT-LVL V: CPT | Mod: PBBFAC,HCNC,,

## 2024-02-01 RX ORDER — ALBUTEROL SULFATE 90 UG/1
2 AEROSOL, METERED RESPIRATORY (INHALATION) EVERY 6 HOURS PRN
Qty: 18 G | Refills: 0 | Status: SHIPPED | OUTPATIENT
Start: 2024-02-01

## 2024-02-01 RX ORDER — METHYLPREDNISOLONE 4 MG/1
TABLET ORAL
Qty: 21 EACH | Refills: 0 | Status: SHIPPED | OUTPATIENT
Start: 2024-02-01 | End: 2024-02-22

## 2024-02-01 NOTE — PROGRESS NOTES
HPI     Chief Complaint:  Chief Complaint   Patient presents with    Cough    Nasal Congestion       Tommy Keith is a 73 y.o. male with multiple medical diagnoses as listed in the medical history and problem list that presents for cough.  Pt is new to me but seen in clinic with last appointment 12/1/2023.      HPI    Went to [facility] last week for cough, body aches. Neg for covid/flu. Cough still persistent. Having body aches and productive cough. Symptoms worse in the afternoon. No help from cough medication. Prescribed promethazine-DM and tessalon. Denies chest pain or SOB. Wheezing throughout day. No fever. Going on vacation next week and worried about persistent symptoms. Using flonase. Symptoms have improved except for cough and body aches.     Other concerns below  Assessment & Plan       Problem List Items Addressed This Visit          Pulmonary    Bronchiectasis without complication  Stable, asymptomatic chronic condition.  Will continue to maximize risk factor reduction and adjust medication as needed      Overview     -mild basilar bronchiectasis.  Clinically asymptomatic until to covid infection.  Now fully vaccinated.    -recent symptoms may relate to covid infection.  Most recent cxr with increase reticular markings but not overt consolidation.  This can be c/w recent covid infection. Ok to resume exercise.  -will obtain repeat pft for comparison  -continue with combivent.            Cardiac/Vascular    Atherosclerosis of aorta  Stable, asymptomatic chronic condition.  Will continue to maximize risk factor reduction and adjust medication as needed  ASA, statin    Overview     US Abdominal AortaPerformed 1/16/2018  Patient with Atherosclerosis of the Aorta.  Stable/asymptomatic. Currently stable on lipid lowering treatment and b/p monitoring.            Other Visit Diagnoses       Bronchospasm    -  Primary  Albuterol inhaler  Dosepack    Return precautions  Education provided    Relevant  Medications    albuterol (VENTOLIN HFA) 90 mcg/actuation inhaler    methylPREDNISolone (MEDROL DOSEPACK) 4 mg tablet            --------------------------------------------      Health Maintenance:  Health Maintenance         Date Due Completion Date    RSV Vaccine (Age 60+ and Pregnant patients) (1 - 1-dose 60+ series) Never done ---    COVID-19 Vaccine (3 - 2023-24 season) 09/01/2023 8/20/2021    High Dose Statin 12/01/2024 12/1/2023    Hemoglobin A1c (Prediabetes) 12/05/2024 12/5/2023    Colorectal Cancer Screening 11/16/2028 11/16/2023    Lipid Panel 12/05/2028 12/5/2023    TETANUS VACCINE 06/29/2031 6/29/2021            Follow Up:  Follow up if symptoms worsen or fail to improve.    Discussed DDx, condition, and treatment.   Education sent to patient portal/included in after visit summary.  ED precautions given.   Notify provider if symptoms do not resolve or increase in severity.   Patient verbalizes understanding and agrees with plan of care.      Exam     Review of Systems:  (as noted above)  Review of Systems   Respiratory:  Positive for cough and wheezing. Negative for shortness of breath.    Cardiovascular:  Negative for chest pain and palpitations.   Neurological:  Positive for headaches. Negative for dizziness.       Physical Exam:   Physical Exam  Constitutional:       General: He is not in acute distress.     Appearance: Normal appearance. He is normal weight. He is not toxic-appearing.   HENT:      Head: Normocephalic and atraumatic.      Right Ear: Tympanic membrane normal.      Left Ear: Tympanic membrane normal.      Nose: No congestion.      Mouth/Throat:      Mouth: Mucous membranes are moist.      Pharynx: No oropharyngeal exudate.   Cardiovascular:      Rate and Rhythm: Normal rate and regular rhythm.      Pulses: Normal pulses.      Heart sounds: No murmur heard.  Pulmonary:      Effort: Pulmonary effort is normal. No respiratory distress.      Breath sounds: Normal breath sounds. No  "wheezing.   Abdominal:      General: Bowel sounds are normal.      Palpations: Abdomen is soft.   Musculoskeletal:         General: Normal range of motion.      Cervical back: Normal range of motion and neck supple.   Skin:     General: Skin is warm and dry.      Capillary Refill: Capillary refill takes less than 2 seconds.   Neurological:      General: No focal deficit present.      Mental Status: He is alert.   Psychiatric:         Mood and Affect: Mood normal.       Vitals:    24 1124   BP: 132/88   Pulse: 90   Temp: 97.6 °F (36.4 °C)   TempSrc: Oral   SpO2: 97%   Weight: 94.5 kg (208 lb 5.4 oz)   Height: 5' 7" (1.702 m)      Body mass index is 32.63 kg/m².        History     Past Medical History:  Past Medical History:   Diagnosis Date    Asthma, intermittent     irritant induced    History of BPH     s/p laser TURP    History of hepatitis C     s/p treatment, in remission    History of migraine headaches     History of multiple pulmonary nodules     Hyperlipidemia     Low serum testosterone level     Migraine headache     OA (osteoarthritis)     Obesity     Sciatica of left side     Spinal stenosis        Past Surgical History:  Past Surgical History:   Procedure Laterality Date    arthroscopy of both knees      COLONOSCOPY N/A 2018    Procedure: COLONOSCOPY;  Surgeon: Jovon Nunez MD;  Location: [facility];  Service: Endoscopy;  Laterality: N/A;    left inguinal hernia repair      LIVER BIOPSY      tonsillectomy      TONSILLECTOMY      UMBILICAL HERNIA REPAIR      VENTRAL HERNIA REPAIR         Social History:  Social History     Socioeconomic History    Marital status:      Spouse name: Deloris    Number of children: 1   Occupational History    Occupation: works at a chemical plant    Occupation: Retired   Tobacco Use    Smoking status: Former     Current packs/day: 0.00     Types: Cigarettes     Quit date: 2006     Years since quittin.4     Passive exposure: Past    Smokeless " tobacco: Never   Substance and Sexual Activity    Alcohol use: No    Drug use: No    Sexual activity: Yes     Partners: Female   Social History Narrative    He is walking regularly.       Family History:  Family History   Problem Relation Age of Onset    Heart disease Father     Clotting disorder Brother     Diabetes Brother     Hyperlipidemia Brother     Cancer Brother     Diabetes Sister     Hyperlipidemia Sister        Allergies and Medications: (updated and reviewed)  Review of patient's allergies indicates:   Allergen Reactions    Levaquin [levofloxacin]      Current Outpatient Medications   Medication Sig Dispense Refill    ASCORBIC ACID, VITAMIN C, ORAL Take by mouth.      aspirin (ECOTRIN) 81 MG EC tablet Take 81 mg by mouth once daily.      atorvastatin (LIPITOR) 80 MG tablet TAKE 1 TABLET BY MOUTH EVERY DAY IN THE EVENING 90 tablet 3    azelastine (ASTELIN) 137 mcg (0.1 %) nasal spray 1 spray (137 mcg total) by Nasal route 2 (two) times daily. 30 mL 1    benzonatate (TESSALON) 200 MG capsule Take 1 capsule (200 mg total) by mouth 3 (three) times daily as needed for Cough. 30 capsule 1    COMBIVENT RESPIMAT  mcg/actuation inhaler INHALE 2 PUFFS INTO THE LUNGS EVERY 4 (FOUR) HOURS AS NEEDED FOR WHEEZING. RESCUE 4 g 3    cyanocobalamin (VITAMIN B-12) 100 MCG tablet Take by mouth.      diclofenac sodium (VOLTAREN) 1 % Gel Apply 2 g topically 2 (two) times daily as needed (pain). 100 g 1    fluticasone propionate (FLONASE) 50 mcg/actuation nasal spray 2 sprays (100 mcg total) by Each Nostril route once daily. 48 mL 1    FOLIC ACID/MULTIVIT-MIN/LUTEIN (CENTRUM SILVER ORAL) Take by mouth.      glucosamine-chondroitin 500-400 mg tablet Take 1 tablet by mouth 3 (three) times daily.      KRILL OIL ORAL Take by mouth.      NON FORMULARY MEDICATION Take by mouth.      omega-3 fatty acids/fish oil (FISH OIL-OMEGA-3 FATTY ACIDS) 300-1,000 mg capsule Take 1 capsule by mouth once daily.       promethazine-dextromethorphan (PROMETHAZINE-DM) 6.25-15 mg/5 mL Syrp Take 5 mLs by mouth every 8 (eight) hours as needed (cough). 180 mL 1    sumatriptan (IMITREX STATDOSE) 6 mg/0.5 mL kit USE AS DIRECTED MAY REPEAT IN 1 HR MAX 2 DOSES PER 24 HOURS 1 kit 6    testosterone cypionate (DEPOTESTOTERONE CYPIONATE) 200 mg/mL injection Inject 200 mg into the muscle every 14 (fourteen) days.  3    topiramate (TOPAMAX) 25 MG tablet TAKE 3 TABLETS (75 MG TOTAL) BY MOUTH EVERY EVENING. 270 tablet 1    topiramate (TOPAMAX) 25 MG tablet Take 75 mg by mouth.      vitamin E, dl, acetate, 22.5 mg (50 unit)/mL Drop Take by mouth.      albuterol (VENTOLIN HFA) 90 mcg/actuation inhaler Inhale 2 puffs into the lungs every 6 (six) hours as needed for Wheezing. Rescue 18 g 0    methylPREDNISolone (MEDROL DOSEPACK) 4 mg tablet use as directed 21 each 0     No current facility-administered medications for this visit.       Patient Care Team:  Angelica Gomez MD as PCP - General (Family Medicine)  Tavon Bailey II, MD as Consulting Physician (Gastroenterology)  Vik Brian MA as Care Coordinator         - The patient is given an After Visit Summary that lists all medications with directions, allergies, education, orders placed during this encounter and follow-up instructions.      - I have reviewed the patient's medical information including past medical, family, and social history sections including the medications and allergies.      - We discussed the patient's current medications.     This note was created by combination of typed  and MModal dictation.  Transcription errors may be present.  If there are any questions, please contact me.

## 2024-02-12 NOTE — ED PROVIDER NOTES
Encounter Date: 1/29/2018       History     Chief Complaint   Patient presents with    Urinary Urgency     started last night. reports pmh enlarged protaste    Urinary Frequency     Tommy Keith is a 67 y.o. male who presents to the Emergency Department with  Fevers, chills, rectal pain, and dysuria.  Feels like another prostate infection.  Sick for last few weeks.  Getting worse.,  This morning temperature was 101.  Patient reports fevers and chills.  He did take Tylenol at home.  It does help.  Patient reports his last prostate infection was one year ago.  He has urologist with [facility] is in surgery and was unable to see him today.  They told him to the ED       The history is provided by the patient and the spouse.     Review of patient's allergies indicates:   Allergen Reactions    Levaquin [levofloxacin]      Past Medical History:   Diagnosis Date    Asthma, intermittent     irritant induced    History of BPH     s/p laser TURP    History of hepatitis C     s/p treatment, in remission    History of migraine headaches     History of multiple pulmonary nodules     Hyperlipidemia     Low serum testosterone level     Migraine headache     OA (osteoarthritis)     Obesity     Sciatica of left side     Spinal stenosis      Past Surgical History:   Procedure Laterality Date    arthroscopy of both knees      left inguinal hernia repair      LIVER BIOPSY      tonsillectomy      TONSILLECTOMY      UMBILICAL HERNIA REPAIR      VENTRAL HERNIA REPAIR       Family History   Problem Relation Age of Onset    Heart disease Father     Clotting disorder Brother      Social History   Substance Use Topics    Smoking status: Former Smoker     Quit date: 8/16/2006    Smokeless tobacco: Never Used    Alcohol use No     Review of Systems   Constitutional: Positive for chills and fever.   HENT: Negative for sore throat.    Respiratory: Positive for cough. Negative for shortness of breath.   Goal Outcome Evaluation:      Plan of Care Reviewed With: patient, family    Pt A&Ox3, [place] speaking. Son at bedside this afternoon. Stable on RA; cont pulse ox. Up 1A gb/w. Voiding in BR. BG controlled. PT to evaluate- discharging pending therapy recommendations HHC vs TCU.        Cardiovascular: Negative for chest pain.   Gastrointestinal: Positive for rectal pain. Negative for nausea and vomiting.   Genitourinary: Positive for flank pain, frequency and urgency. Negative for dysuria.   Musculoskeletal: Negative for back pain.   Skin: Negative for rash.   Neurological: Negative for weakness.   Hematological: Does not bruise/bleed easily.   All other systems reviewed and are negative.      Physical Exam     Initial Vitals [01/29/18 0850]   BP Pulse Resp Temp SpO2   116/79 (!) 115 18 98.2 °F (36.8 °C) 98 %      MAP       91.33         Physical Exam    Nursing note and vitals reviewed.  Constitutional: He appears well-developed and well-nourished. He is not diaphoretic. No distress.   HENT:   Head: Normocephalic and atraumatic.   Right Ear: External ear normal.   Left Ear: External ear normal.   Nose: Nose normal.   Mouth/Throat: Uvula is midline. Mucous membranes are dry. No posterior oropharyngeal erythema.   Eyes: EOM are normal. Pupils are equal, round, and reactive to light. Right eye exhibits no discharge. Left eye exhibits no discharge.   Neck: Normal range of motion. Neck supple.   Cardiovascular: Regular rhythm, S1 normal, S2 normal, normal heart sounds and intact distal pulses. Tachycardia present.  Exam reveals no gallop and no friction rub.    No murmur heard.  Pulmonary/Chest: Breath sounds normal. No respiratory distress. He has no wheezes. He has no rhonchi. He has no rales. He exhibits no tenderness.   Abdominal: Soft. Bowel sounds are normal. He exhibits no distension and no mass. There is tenderness. There is no rebound and no guarding.   Genitourinary: Rectal exam shows tenderness. Rectal exam shows no external hemorrhoid, no internal hemorrhoid and guaiac negative stool. Guaiac negative stool. Prostate is enlarged and tender.   Musculoskeletal: Normal range of motion.   Neurological: He is alert and oriented to person, place, and time. No cranial nerve deficit or sensory  deficit.   Skin: Skin is warm. Capillary refill takes less than 2 seconds. No rash noted. No pallor.   Psychiatric: He has a normal mood and affect.         ED Course   Critical Care  Date/Time: 1/29/2018 12:09 PM  Performed by: CHANTELL BULLARD  Authorized by: CHANTELL BULLARD   Direct patient critical care time: 15 minutes  Additional history critical care time: 10 minutes  Ordering / reviewing critical care time: 15 minutes  Documentation critical care time: 12 minutes  Consult with family critical care time: 10 minutes  Total critical care time (exclusive of procedural time) : 62 minutes  Critical care was necessary to treat or prevent imminent or life-threatening deterioration of the following conditions: sepsis.  Critical care was time spent personally by me on the following activities: evaluation of patient's response to treatment, examination of patient, obtaining history from patient or surrogate, ordering and performing treatments and interventions, ordering and review of laboratory studies, ordering and review of radiographic studies, pulse oximetry, re-evaluation of patient's condition and review of old charts.        Labs Reviewed   POCT URINALYSIS W/O SCOPE - Abnormal; Notable for the following:        Result Value    Glucose, UA Negative (*)     Bilirubin, UA Negative (*)     Ketones, UA Negative (*)     Blood, UA 3+ (*)     Protein, UA 1+ (*)     Nitrite, UA Negative (*)     Leukocytes, UA 3+ (*)     All other components within normal limits   CULTURE, URINE   CULTURE, BLOOD   CULTURE, BLOOD   POCT URINALYSIS W/O SCOPE   POCT CBC   POCT B-TYPE NATRIURETIC PEPTIDE (BNP)   POCT CMP   POCT PROTIME-INR   POCT TROPONIN     EKG Readings: (Independently Interpreted)   Initial Reading: No STEMI. Rhythm: Normal Sinus Rhythm. Heart Rate: 95. Axis: Normal. Clinical Impression: Normal Sinus Rhythm Other Impression: EKG, ventricular rate of 95, QTC normal at 389.     Imaging Results          CT Abdomen Pelvis With  Contrast (Final result)  Result time 01/29/18 11:40:01    Final result by Jimenez Gaston DO (01/29/18 11:40:01)                 Impression:     Nonspecific mild enlargement of the prostate with prostate calcifications. There is tubular hypodensity in the central aspect of the prostate with central calcification measuring 3 mm. overall concerning for dilated prostatic urethra and a superimposed prostatic urethral calculus. Clinical correlation and urological evaluation advised.    No evidence for hydronephrosis with uptake and excretion of contrast bilaterally    Colonic diverticulosis without evidence for acute diverticulitis.    No signs of appendicitis    Remote operative change from umbilical and left inguinal hernia repair. There is a small right inguinal hernia containing fat only     stable subcentimeter right lower lobe lung nodule and small left renal cyst.    Please see above for additional details.      Electronically signed by: JIMENEZ GASTON DO  Date:     01/29/18  Time:    11:40              Narrative:      Procedure: contrast-enhanced CT of the abdomen and pelvis    Technique: 5 mm axial images of the abdomen and pelvis post intravenous contrast administration. 5 mm axial delayed images of the abdomen and pelvis also performed. 100 cc of Omnipaque 350 IV contrast administered.          Comparison: CT without contrast 01/22/2016     Results:Subcentimeter nodule within the posterior segment of the right lower lobe measuring 5 mm unchanged there there is linear opacities in the bilateral lower lobes suggestive for atelectasis. The liver, spleen and pancreas are normal in size without focal lesion. The adrenal glands are within normal limits bilaterally.    The kidneys measure approximately 10-11 cm in length bilaterally. There is uptake and excretion of contrast bilaterally.    There is a 1.6 cm hypodense lesion within the mid aspect of left kidney with Hounsfield units of 10 compatible with a  cyst        There is an additional subcentimeter hypodensities in the kidneys bilaterally which may be additional cysts are too small for further characterization. Scans from umbilical and left inguinal hernia repairs. A small right inguinal hernia containing fat only. Colonic diverticulosis without evidence for acute diverticulitis.    Normal appendix identified. No free intraperitoneal gas or fluid. Mild prostatomegaly with prostate calcifications. Tubular dilatation of the presumed prostatic urethra with 3 mm calculus central prostate concerning for possible prostatic urethral calculus. Clinical correlation advised.     No evidence for acute fracture of the visualized spine with grade 1 anterolisthesis of L4 on L5                             X-Ray Chest AP Portable (Final result)  Result time 01/29/18 09:53:15    Final result by Tg Arellano MD (01/29/18 09:53:15)                 Impression:     No evidence acute cardiac or pulmonary disease.      Electronically signed by: TG ARELLANO MD  Date:     01/29/18  Time:    09:53              Narrative:    Views: Portable    There is no pneumothorax, pleural effusion or focal consolidation. Right costophrenic angle is not completely the field-of-view. The cardiac silhouette is within normal limits. The trachea is midline.  The osseous structures are unremarkable.                                                   ED Course        Medical decision making   Chief complaint: Eye prostate infection.  Patient reports fevers chills flank pain and dysuria.  Symptoms getting much worse today  Differential diagnosis: Retract infection, pyelonephritis, acute prostatitis, sepsis, and septic shock  Treatment in the ED Physical Exam, IV fluids, ibuprofen, Pyridium, and ceftriaxone  Patient reports not feeling better after medication.  Patient states he feels very weak and flushed.   Discussed labs, and imaging results.  Urine culture pending.    White blood cell count 25,000,  hemoglobin 16.9, hematocrit 51.6 and platelets 176 INR 1.1 VBG pH is 7.4, lactic acid is 1.57 troponin 0.00 BNP 24 CMP sodium 142, potassium 4.5, bicarbonate 26, chloride 106, glucose 100, BUN 16, creatinine 1.2  UA 3+ blood, leukocytes 3+, nitrates negative.  Negative glucose and negative ketones     CT findings abnormal.  Read as follows:  Enlargement of the prostate with prostate calcifications. There is tubular hypodensity in the central aspect of the prostate with central calcification measuring 3 mm. overall concerning for dilated prostatic urethra and a superimposed prostatic urethral calculus. Clinical correlation and urological evaluation advised.   patient and his wife are requesting admission at  [facility], his urologist works there   Contacted [facility] and  spoke with [facility] at 12:08.  Patient is requesting admission at [facility].  His urologist is Dr. Hitesh Montesinos.   Discussed patient's presentation, past medical history, physical exam, and ED course.  Also discussed vital signs and diagnosis is.  At this time patient will be transferred & admitted.  Patient is agreeable to transfer & admission at [facility].  Patient accepted [facility] ED transfer by Dr. Hayes.    At time of transfer patient is awake alert oriented ×4 speaking clearly in full sentences moving all 4 extremities        Clinical Impression:   The primary encounter diagnosis was Acute cystitis with hematuria. Diagnoses of Tachycardia, Acute prostatitis with hematuria, and Sepsis, due to unspecified organism were also pertinent to this visit.                           Drea Zamudio DO  01/29/18 [id number]

## 2024-03-05 ENCOUNTER — OFFICE VISIT (OUTPATIENT)
Dept: URGENT CARE | Facility: CLINIC | Age: 74
End: 2024-03-05
Payer: MEDICARE

## 2024-03-05 VITALS
HEART RATE: 97 BPM | WEIGHT: 208 LBS | RESPIRATION RATE: 16 BRPM | DIASTOLIC BLOOD PRESSURE: 84 MMHG | BODY MASS INDEX: 32.65 KG/M2 | SYSTOLIC BLOOD PRESSURE: 127 MMHG | OXYGEN SATURATION: 96 % | TEMPERATURE: 98 F | HEIGHT: 67 IN

## 2024-03-05 DIAGNOSIS — R05.8 UPPER AIRWAY COUGH SYNDROME: Primary | ICD-10-CM

## 2024-03-05 DIAGNOSIS — Z76.0 ENCOUNTER FOR MEDICATION REFILL: ICD-10-CM

## 2024-03-05 PROCEDURE — 99213 OFFICE O/P EST LOW 20 MIN: CPT | Mod: S$GLB,,, | Performed by: FAMILY MEDICINE

## 2024-03-05 RX ORDER — IPRATROPIUM BROMIDE AND ALBUTEROL 20; 100 UG/1; UG/1
2 SPRAY, METERED RESPIRATORY (INHALATION) EVERY 4 HOURS PRN
Qty: 4 G | Refills: 0 | Status: SHIPPED | OUTPATIENT
Start: 2024-03-05 | End: 2024-04-04

## 2024-03-05 RX ORDER — AZITHROMYCIN 250 MG/1
TABLET, FILM COATED ORAL
Qty: 6 TABLET | Refills: 0 | Status: SHIPPED | OUTPATIENT
Start: 2024-03-05 | End: 2024-03-10

## 2024-03-05 RX ORDER — CODEINE PHOSPHATE AND GUAIFENESIN 10; 100 MG/5ML; MG/5ML
5 SOLUTION ORAL 3 TIMES DAILY PRN
Qty: 100 ML | Refills: 0 | Status: SHIPPED | OUTPATIENT
Start: 2024-03-05 | End: 2024-03-15

## 2024-03-05 NOTE — PROGRESS NOTES
"Subjective:      Patient ID: Tommy Keith is a 73 y.o. male.    Vitals:  height is 5' 7" (1.702 m) and weight is 94.3 kg (208 lb). His oral temperature is 97.6 °F (36.4 °C). His blood pressure is 127/84 and his pulse is 97. His respiration is 16 and oxygen saturation is 96%.     Chief Complaint: Cough    Pt is being seen for a persistent cough for over a month now that gradually improved and resolved but then returned after a week long vacation that gradually worsen to a sore throat, chest congestion, nasal congestion.Pt has been using nasal spray, cough pearls and an " combivant inhaler ( needs refill). Pt took at home Covid test at home ( neg results)     Cough  This is a recurrent problem. The current episode started more than 1 month ago. The problem has been unchanged. The cough is Productive of sputum. Associated symptoms include nasal congestion and a sore throat. Associated symptoms comments: Fatigue   Ear fullness   Body aches   . Nothing aggravates the symptoms. He has tried OTC cough suppressant and prescription cough suppressant for the symptoms. The treatment provided no relief.       HENT:  Positive for sore throat.    Respiratory:  Positive for cough.       Objective:     Physical Exam   Constitutional: He is oriented to person, place, and time.   HENT:   Head: Normocephalic.   Ears:   Right Ear: External ear normal.   Left Ear: External ear normal.   Nose: Nose normal.   Mouth/Throat: Mucous membranes are moist.   Eyes: Conjunctivae are normal.   Cardiovascular: Normal rate.   Pulmonary/Chest: Effort normal. No respiratory distress. He has no wheezes. He has no rales.         Comments: A bit of a heavy breather but no wheezing.     Musculoskeletal: Normal range of motion.         General: Normal range of motion.   Neurological: He is alert and oriented to person, place, and time.   Skin: Skin is dry.   Psychiatric: His behavior is normal.       Assessment:     1. Upper airway cough syndrome  "   2. Encounter for medication refill        Plan:       Upper airway cough syndrome  -     ipratropium-albuteroL (COMBIVENT RESPIMAT)  mcg/actuation inhaler; Inhale 2 puffs into the lungs every 4 (four) hours as needed for Wheezing or Shortness of Breath. Rescue  Dispense: 4 g; Refill: 0  -     azithromycin (Z-TRAVIS) 250 MG tablet; Take 2 tablets by mouth on day 1; Take 1 tablet by mouth on days 2-5  Dispense: 6 tablet; Refill: 0  -     guaiFENesin-codeine 100-10 mg/5 ml (TUSSI-ORGANIDIN NR)  mg/5 mL syrup; Take 5 mLs by mouth 3 (three) times daily as needed for Cough.  Dispense: 100 mL; Refill: 0    Encounter for medication refill  -     ipratropium-albuteroL (COMBIVENT RESPIMAT)  mcg/actuation inhaler; Inhale 2 puffs into the lungs every 4 (four) hours as needed for Wheezing or Shortness of Breath. Rescue  Dispense: 4 g; Refill: 0    Recent visit history related to cough:   01.27 promethazine DM and tessalon   02.01-- was given steroids and inhaler.   Return from vacation with worsening symptoms again. No wheezing on exam. Given pulmonary history will treat with antibiotics and tussin. Rec he follow up with is pcp.    I have refilled his inhaler.

## 2024-04-01 DIAGNOSIS — E78.5 HYPERLIPIDEMIA, UNSPECIFIED HYPERLIPIDEMIA TYPE: ICD-10-CM

## 2024-04-02 RX ORDER — ATORVASTATIN CALCIUM 80 MG/1
TABLET, FILM COATED ORAL
Qty: 90 TABLET | Refills: 3 | Status: SHIPPED | OUTPATIENT
Start: 2024-04-02

## 2024-07-26 NOTE — TELEPHONE ENCOUNTER
Last visit: 2/26/19  Last Med refill: 3/19/19, with 3 refills  Does patient have enough medication for 72 hours: Yes, writer unable to refuse.     Next Visit Date:  Future Appointments   Date Time Provider Jersey Leblanc   5/31/2019  8:55 AM [address] Delvin Sprague MD [facility] IM [address] Maintenance   Topic Date Due    Shingles Vaccine (1 of 2) 09/09/2016    Colon Cancer Screen FIT/FOBT  12/16/2017    Lipid screen  01/23/2024    DTaP/Tdap/Td vaccine (2 - Td) 01/07/2026    Flu vaccine  Completed    Pneumococcal 0-64 years Vaccine  Completed    HIV screen  Completed       Hemoglobin A1C (%)   Date Value   01/23/2019 5.3   08/13/2015 5.2             ( goal A1C is < 7)   No results found for: LABMICR  LDL Cholesterol (mg/dL)   Date Value   01/23/2019 118   08/13/2015 96       (goal LDL is <100)   AST (U/L)   Date Value   11/02/2017 24     ALT (U/L)   Date Value   11/02/2017 43 (H)     BUN (mg/dL)   Date Value   06/27/2018 15     BP Readings from Last 3 Encounters:   02/26/19 127/83   09/26/18 133/81   06/27/18 129/87          (goal 120/80)    All Future Testing planned in [facility]  Lab Frequency Next Occurrence   Echocardiogram complete Once 05/26/2018   HIV Screen Once 04/18/2019   Vitamin D 25 Hydroxy Once 04/19/2019               Patient Active Problem List:     Smoker     Alcohol abuse     Chronic back pain     GERD (gastroesophageal reflux disease)     Vitamin D deficiency     Carpal tunnel syndrome of right wrist     Dry skin     Allergic rhinitis     Regular astigmatism     Error, refractive, myopia     Presbyopia Needs appointment for future refills      0 (no pain/absence of nonverbal indicators of pain)

## 2024-10-17 DIAGNOSIS — G43.909 MIGRAINE WITHOUT STATUS MIGRAINOSUS, NOT INTRACTABLE, UNSPECIFIED MIGRAINE TYPE: ICD-10-CM

## 2024-10-17 NOTE — TELEPHONE ENCOUNTER
No care due was identified.  [facility] Embedded Care Due Messages. Reference number: [id number].   10/17/2024 12:26:48 AM CDT

## 2024-10-21 RX ORDER — CEFUROXIME AXETIL 250 MG/1
TABLET ORAL
Qty: 1 KIT | Refills: 1 | Status: SHIPPED | OUTPATIENT
Start: 2024-10-21

## 2024-12-03 ENCOUNTER — OFFICE VISIT (OUTPATIENT)
Dept: FAMILY MEDICINE | Facility: CLINIC | Age: 74
End: 2024-12-03
Payer: MEDICARE

## 2024-12-03 ENCOUNTER — HOSPITAL ENCOUNTER (OUTPATIENT)
Dept: RADIOLOGY | Facility: HOSPITAL | Age: 74
Discharge: HOME | End: 2024-12-03
Attending: FAMILY MEDICINE
Payer: MEDICARE

## 2024-12-03 VITALS
WEIGHT: 214.06 LBS | SYSTOLIC BLOOD PRESSURE: 126 MMHG | HEIGHT: 67 IN | HEART RATE: 83 BPM | OXYGEN SATURATION: 97 % | DIASTOLIC BLOOD PRESSURE: 86 MMHG | BODY MASS INDEX: 33.6 KG/M2

## 2024-12-03 DIAGNOSIS — Z12.5 ENCOUNTER FOR SCREENING FOR MALIGNANT NEOPLASM OF PROSTATE: ICD-10-CM

## 2024-12-03 DIAGNOSIS — Z00.00 ANNUAL PHYSICAL EXAM: ICD-10-CM

## 2024-12-03 DIAGNOSIS — R73.03 PREDIABETES: ICD-10-CM

## 2024-12-03 DIAGNOSIS — M25.572 ACUTE LEFT ANKLE PAIN: ICD-10-CM

## 2024-12-03 DIAGNOSIS — Z23 FLU VACCINE NEED: Primary | ICD-10-CM

## 2024-12-03 DIAGNOSIS — E78.2 MIXED HYPERLIPIDEMIA: ICD-10-CM

## 2024-12-03 DIAGNOSIS — G43.909 MIGRAINE WITHOUT STATUS MIGRAINOSUS, NOT INTRACTABLE, UNSPECIFIED MIGRAINE TYPE: ICD-10-CM

## 2024-12-03 DIAGNOSIS — I89.0 LYMPHEDEMA: ICD-10-CM

## 2024-12-03 PROCEDURE — 73610 X-RAY EXAM OF ANKLE: CPT | Mod: 26,HCNC,LT, | Performed by: RADIOLOGY

## 2024-12-03 PROCEDURE — 73610 X-RAY EXAM OF ANKLE: CPT | Mod: TC,HCNC,FY,PO,LT

## 2024-12-03 PROCEDURE — 3288F FALL RISK ASSESSMENT DOCD: CPT | Mod: CPTII,S$GLB,, | Performed by: FAMILY MEDICINE

## 2024-12-03 PROCEDURE — 3079F DIAST BP 80-89 MM HG: CPT | Mod: CPTII,S$GLB,, | Performed by: FAMILY MEDICINE

## 2024-12-03 PROCEDURE — 99213 OFFICE O/P EST LOW 20 MIN: CPT | Mod: 25,S$GLB,, | Performed by: FAMILY MEDICINE

## 2024-12-03 PROCEDURE — 3074F SYST BP LT 130 MM HG: CPT | Mod: CPTII,S$GLB,, | Performed by: FAMILY MEDICINE

## 2024-12-03 PROCEDURE — G0008 ADMIN INFLUENZA VIRUS VAC: HCPCS | Mod: S$GLB,,, | Performed by: FAMILY MEDICINE

## 2024-12-03 PROCEDURE — 99397 PER PM REEVAL EST PAT 65+ YR: CPT | Mod: S$GLB,,, | Performed by: FAMILY MEDICINE

## 2024-12-03 PROCEDURE — 90653 IIV ADJUVANT VACCINE IM: CPT | Mod: S$GLB,,, | Performed by: FAMILY MEDICINE

## 2024-12-03 PROCEDURE — 1125F AMNT PAIN NOTED PAIN PRSNT: CPT | Mod: CPTII,S$GLB,, | Performed by: FAMILY MEDICINE

## 2024-12-03 PROCEDURE — 3008F BODY MASS INDEX DOCD: CPT | Mod: CPTII,S$GLB,, | Performed by: FAMILY MEDICINE

## 2024-12-03 PROCEDURE — 1101F PT FALLS ASSESS-DOCD LE1/YR: CPT | Mod: CPTII,S$GLB,, | Performed by: FAMILY MEDICINE

## 2024-12-03 PROCEDURE — 99999 PR PBB SHADOW E&M-EST. PATIENT-LVL V: CPT | Mod: PBBFAC,HCNC,, | Performed by: FAMILY MEDICINE

## 2024-12-03 RX ORDER — MELOXICAM 7.5 MG/1
7.5 TABLET ORAL DAILY PRN
Qty: 90 TABLET | Refills: 3 | Status: SHIPPED | OUTPATIENT
Start: 2024-12-03

## 2024-12-03 RX ORDER — TOPIRAMATE 25 MG/1
75 TABLET ORAL NIGHTLY
Qty: 270 TABLET | Refills: 3 | Status: SHIPPED | OUTPATIENT
Start: 2024-12-03

## 2024-12-03 RX ORDER — CEFUROXIME AXETIL 250 MG/1
TABLET ORAL
Qty: 1 KIT | Refills: 3 | Status: SHIPPED | OUTPATIENT
Start: 2024-12-03

## 2024-12-03 RX ORDER — MELOXICAM 7.5 MG/1
7.5 TABLET ORAL DAILY PRN
COMMUNITY
End: 2024-12-03 | Stop reason: SDUPTHER

## 2024-12-03 NOTE — PROGRESS NOTES
"Routine Office Visit     Patient Name: Tommy Keith    : 1950  MRN: [id number]    Subjective     History of Present Illness    CHIEF COMPLAINT:  Patient presents today for annual follow-up and ankle pain.    ANKLE PAIN:  He reports ankle pain that started last night after moving his leg across a lounge chair. He mentions a fall that occurred a week ago. The pain is described as similar to a previous blood clot. The ankle is puffy.    MEDICATIONS:  He is currently taking Topiramate, 3 tablets at night, for migraines. He uses Imitrex injection for migraines, up to 3 times per week. He is on Meloxicam for arthritis and takes a cholesterol medication prescribed by his cardiologist. He also uses allergy medications and sprays for asthma. He takes baby aspirin.    MEDICAL HISTORY:  He has a history of migraines, arthritis, asthma, and cardiovascular issues.    DIET:  He reports avoiding red meat and shrimp.    LABS:  He is scheduled for labs, including a PSA test and uric acid level to evaluate for gout, to be completed at [facility].      ROS:  General: no fever, no chills, no fatigue, no weight gain, no weight loss  Eyes: no vision changes, no redness, no discharge  ENT: no ear pain, no nasal congestion, no sore throat  Cardiovascular: no chest pain, no palpitations, no lower extremity edema  Respiratory: no cough, no shortness of breath  Gastrointestinal: no abdominal pain, no nausea, no vomiting, no diarrhea, no constipation, no blood in stool  Genitourinary: no dysuria, no hematuria, no frequency  Musculoskeletal: +joint pain, no muscle pain, +joint swelling  Skin: no rash, no lesion  Neurological: no headache, no dizziness, no numbness, no tingling  Psychiatric: no anxiety, no depression, no sleep difficulty           Objective     /86   Pulse 83   Ht 5' 7" (1.702 m)   Wt 97.1 kg (214 lb 1.1 oz)   SpO2 97%   BMI 33.53 kg/m²   Physical Exam  Constitutional:       Appearance: He is " well-developed.   HENT:      Head: Normocephalic and atraumatic.   Eyes:      Conjunctiva/sclera: Conjunctivae normal.      Pupils: Pupils are equal, round, and reactive to light.   Neck:      Thyroid: No thyromegaly.      Vascular: No JVD.   Cardiovascular:      Rate and Rhythm: Normal rate and regular rhythm.      Heart sounds: Normal heart sounds.   Pulmonary:      Effort: Pulmonary effort is normal.      Breath sounds: Normal breath sounds. No wheezing.   Abdominal:      General: Bowel sounds are normal. There is no distension.      Palpations: Abdomen is soft.      Tenderness: There is no abdominal tenderness. There is no guarding.   Musculoskeletal:         General: Normal range of motion.      Cervical back: Normal range of motion and neck supple.   Lymphadenopathy:      Cervical: No cervical adenopathy.   Skin:     General: Skin is warm and dry.   Neurological:      Mental Status: He is alert and oriented to person, place, and time.   Psychiatric:         Behavior: Behavior normal.           Assessment     Assessment & Plan        LONG-TERM ASPIRIN USE:   Continued baby aspirin.    FOLLOW-UP:   Follow up in 6 months.   Contact the office after getting x-ray results.   Office will call patient with results if patient does not have [employer] access.         Problem List Items Addressed This Visit          Cardiac/Vascular    Mixed hyperlipidemia    Relevant Orders    CBC Auto Differential    Comprehensive Metabolic Panel    Lipid Panel    TSH    PSA, SCREENING    CBC Auto Differential    Comprehensive Metabolic Panel    Lipid Panel    TSH     Other Visit Diagnoses       Flu vaccine need    -  Primary    Relevant Medications    influenza (adjuvanted) (Fluad) 45 mcg/0.5 mL IM vaccine (> or = 64 yo) 0.5 mL (Completed)    Migraine without status migrainosus, not intractable, unspecified migraine type        Relevant Medications    topiramate (TOPAMAX) 25 MG tablet    sumatriptan (IMITREX STATDOSE) 6 mg/0.5 mL kit     Annual physical exam        Relevant Orders    CBC Auto Differential    Comprehensive Metabolic Panel    Lipid Panel    Hemoglobin A1C    TSH    PSA, SCREENING    Prediabetes        Relevant Orders    Hemoglobin A1C    Hemoglobin A1C    Encounter for screening for malignant neoplasm of prostate        Relevant Orders    PSA, SCREENING    Modified visit - topics discussed outside of annual physical    ROS: ankle pain   Palpable pain on lateral ankle left   Acute left ankle pain        Relevant Orders    X-Ray Ankle Complete Left (Completed)    URIC ACID    US Lower Extremity Veins Bilateral   Assessed patient's ankle pain, considering possibility of blood clot, fracture, or gout   Ankle swelling localized to joint area, less suspicious for blood clot   Considered gout as potential cause, though patient denies recent consumption of typical trigger foods   Will add uric acid test to labs to evaluate for gout   Discussed potential causes of ankle pain, including fracture and gout.   Patient to elevate legs when at home.   Patient to apply ice to swollen area of ankle.   Restarted meloxicam for inflammation.   X-ray of left ankle ordered.   Blood work including uric acid test ordered.   Explained differences in presentation between blood clot and current symptoms.        Lymphedema        Relevant Orders    US Lower Extremity Veins Bilateral              This note was generated with the assistance of ambient listening technology. Verbal consent was obtained by the patient and accompanying visitor(s) for the recording of patient appointment to facilitate this note. I attest to having reviewed and edited the generated note for accuracy, though some syntax or spelling errors may persist. Please contact the author of this note for any clarification.

## 2024-12-04 ENCOUNTER — PATIENT OUTREACH (OUTPATIENT)
Dept: ADMINISTRATIVE | Facility: HOSPITAL | Age: 74
End: 2024-12-04
Payer: MEDICARE

## 2024-12-04 ENCOUNTER — LAB VISIT (OUTPATIENT)
Dept: LAB | Facility: HOSPITAL | Age: 74
End: 2024-12-04
Payer: MEDICARE

## 2024-12-04 DIAGNOSIS — Z00.00 ANNUAL PHYSICAL EXAM: ICD-10-CM

## 2024-12-04 DIAGNOSIS — Z12.5 ENCOUNTER FOR SCREENING FOR MALIGNANT NEOPLASM OF PROSTATE: ICD-10-CM

## 2024-12-04 DIAGNOSIS — E78.2 MIXED HYPERLIPIDEMIA: ICD-10-CM

## 2024-12-04 DIAGNOSIS — R73.03 PREDIABETES: ICD-10-CM

## 2024-12-04 LAB
ALBUMIN SERPL BCP-MCNC: 4.1 G/DL (ref 3.5–5.2)
ALP SERPL-CCNC: 79 U/L (ref 40–150)
ALT SERPL W/O P-5'-P-CCNC: 57 U/L (ref 10–44)
ANION GAP SERPL CALC-SCNC: 8 MMOL/L (ref 8–16)
AST SERPL-CCNC: 42 U/L (ref 10–40)
BASOPHILS # BLD AUTO: 0.05 K/UL (ref 0–0.2)
BASOPHILS NFR BLD: 0.6 % (ref 0–1.9)
BILIRUB SERPL-MCNC: 1 MG/DL (ref 0.1–1)
BUN SERPL-MCNC: 19 MG/DL (ref 8–23)
CALCIUM SERPL-MCNC: 10 MG/DL (ref 8.7–10.5)
CHLORIDE SERPL-SCNC: 108 MMOL/L (ref 95–110)
CHOLEST SERPL-MCNC: 224 MG/DL (ref 120–199)
CHOLEST/HDLC SERPL: 5.9 {RATIO} (ref 2–5)
CO2 SERPL-SCNC: 22 MMOL/L (ref 23–29)
COMPLEXED PSA SERPL-MCNC: 2.2 NG/ML (ref 0–4)
CREAT SERPL-MCNC: 0.9 MG/DL (ref 0.5–1.4)
DIFFERENTIAL METHOD BLD: ABNORMAL
EOSINOPHIL # BLD AUTO: 0.2 K/UL (ref 0–0.5)
EOSINOPHIL NFR BLD: 1.8 % (ref 0–8)
ERYTHROCYTE [DISTWIDTH] IN BLOOD BY AUTOMATED COUNT: 13.5 % (ref 11.5–14.5)
EST. GFR  (NO RACE VARIABLE): >60 ML/MIN/1.73 M^2
ESTIMATED AVG GLUCOSE: 111 MG/DL (ref 68–131)
GLUCOSE SERPL-MCNC: 88 MG/DL (ref 70–110)
HBA1C MFR BLD: 5.5 % (ref 4–5.6)
HCT VFR BLD AUTO: 50.7 % (ref 40–54)
HDLC SERPL-MCNC: 38 MG/DL (ref 40–75)
HDLC SERPL: 17 % (ref 20–50)
HGB BLD-MCNC: 16.5 G/DL (ref 14–18)
IMM GRANULOCYTES # BLD AUTO: 0.04 K/UL (ref 0–0.04)
IMM GRANULOCYTES NFR BLD AUTO: 0.5 % (ref 0–0.5)
LDLC SERPL CALC-MCNC: 163 MG/DL (ref 63–159)
LYMPHOCYTES # BLD AUTO: 1.6 K/UL (ref 1–4.8)
LYMPHOCYTES NFR BLD: 18.6 % (ref 18–48)
MCH RBC QN AUTO: 31.7 PG (ref 27–31)
MCHC RBC AUTO-ENTMCNC: 32.5 G/DL (ref 32–36)
MCV RBC AUTO: 97 FL (ref 82–98)
MONOCYTES # BLD AUTO: 0.9 K/UL (ref 0.3–1)
MONOCYTES NFR BLD: 10.1 % (ref 4–15)
NEUTROPHILS # BLD AUTO: 5.7 K/UL (ref 1.8–7.7)
NEUTROPHILS NFR BLD: 68.4 % (ref 38–73)
NONHDLC SERPL-MCNC: 186 MG/DL
NRBC BLD-RTO: 0 /100 WBC
PLATELET # BLD AUTO: 238 K/UL (ref 150–450)
PMV BLD AUTO: 10.8 FL (ref 9.2–12.9)
POTASSIUM SERPL-SCNC: 4.5 MMOL/L (ref 3.5–5.1)
PROT SERPL-MCNC: 7.3 G/DL (ref 6–8.4)
RBC # BLD AUTO: 5.21 M/UL (ref 4.6–6.2)
SODIUM SERPL-SCNC: 138 MMOL/L (ref 136–145)
TRIGL SERPL-MCNC: 115 MG/DL (ref 30–150)
TSH SERPL DL<=0.005 MIU/L-ACNC: 0.96 UIU/ML (ref 0.4–4)
WBC # BLD AUTO: 8.38 K/UL (ref 3.9–12.7)

## 2024-12-04 PROCEDURE — 80061 LIPID PANEL: CPT | Mod: HCNC | Performed by: FAMILY MEDICINE

## 2024-12-04 PROCEDURE — 83036 HEMOGLOBIN GLYCOSYLATED A1C: CPT | Performed by: FAMILY MEDICINE

## 2024-12-04 PROCEDURE — 80053 COMPREHEN METABOLIC PANEL: CPT | Mod: HCNC | Performed by: FAMILY MEDICINE

## 2024-12-04 PROCEDURE — 85025 COMPLETE CBC W/AUTO DIFF WBC: CPT | Mod: HCNC | Performed by: FAMILY MEDICINE

## 2024-12-04 PROCEDURE — 84153 ASSAY OF PSA TOTAL: CPT | Mod: HCNC | Performed by: FAMILY MEDICINE

## 2024-12-04 PROCEDURE — 84443 ASSAY THYROID STIM HORMONE: CPT | Mod: HCNC | Performed by: FAMILY MEDICINE

## 2024-12-12 ENCOUNTER — HOSPITAL ENCOUNTER (OUTPATIENT)
Dept: RADIOLOGY | Facility: HOSPITAL | Age: 74
Discharge: HOME | End: 2024-12-12
Attending: FAMILY MEDICINE
Payer: MEDICARE

## 2024-12-12 DIAGNOSIS — I89.0 LYMPHEDEMA: ICD-10-CM

## 2024-12-12 DIAGNOSIS — M25.572 ACUTE LEFT ANKLE PAIN: ICD-10-CM

## 2024-12-12 PROCEDURE — 93970 EXTREMITY STUDY: CPT | Mod: TC,HCNC

## 2025-01-30 DIAGNOSIS — Z00.00 ENCOUNTER FOR MEDICARE ANNUAL WELLNESS EXAM: ICD-10-CM

## 2025-03-12 ENCOUNTER — PATIENT MESSAGE (OUTPATIENT)
Dept: ADMINISTRATIVE | Facility: CLINIC | Age: 75
End: 2025-03-12
Payer: MEDICARE

## 2025-03-12 ENCOUNTER — TELEPHONE (OUTPATIENT)
Dept: ADMINISTRATIVE | Facility: CLINIC | Age: 75
End: 2025-03-12
Payer: MEDICARE

## 2025-03-13 ENCOUNTER — OFFICE VISIT (OUTPATIENT)
Dept: FAMILY MEDICINE | Facility: CLINIC | Age: 75
End: 2025-03-13
Payer: MEDICARE

## 2025-03-13 VITALS
DIASTOLIC BLOOD PRESSURE: 82 MMHG | SYSTOLIC BLOOD PRESSURE: 130 MMHG | WEIGHT: 215.75 LBS | OXYGEN SATURATION: 97 % | HEIGHT: 67 IN | TEMPERATURE: 98 F | HEART RATE: 84 BPM | BODY MASS INDEX: 33.86 KG/M2

## 2025-03-13 DIAGNOSIS — E78.2 MIXED HYPERLIPIDEMIA: ICD-10-CM

## 2025-03-13 DIAGNOSIS — Z87.438 HISTORY OF BPH: ICD-10-CM

## 2025-03-13 DIAGNOSIS — E66.811 CLASS 1 OBESITY DUE TO EXCESS CALORIES WITH SERIOUS COMORBIDITY AND BODY MASS INDEX (BMI) OF 33.0 TO 33.9 IN ADULT: ICD-10-CM

## 2025-03-13 DIAGNOSIS — M47.812 CERVICAL SPONDYLOSIS WITHOUT MYELOPATHY: ICD-10-CM

## 2025-03-13 DIAGNOSIS — J47.9 BRONCHIECTASIS WITHOUT COMPLICATION: ICD-10-CM

## 2025-03-13 DIAGNOSIS — R79.89 LOW SERUM TESTOSTERONE LEVEL: ICD-10-CM

## 2025-03-13 DIAGNOSIS — Z00.00 ENCOUNTER FOR MEDICARE ANNUAL WELLNESS EXAM: Primary | ICD-10-CM

## 2025-03-13 DIAGNOSIS — M54.32 SCIATICA OF LEFT SIDE: ICD-10-CM

## 2025-03-13 DIAGNOSIS — E66.09 CLASS 1 OBESITY DUE TO EXCESS CALORIES WITH SERIOUS COMORBIDITY AND BODY MASS INDEX (BMI) OF 33.0 TO 33.9 IN ADULT: ICD-10-CM

## 2025-03-13 DIAGNOSIS — Z71.89 ADVANCE DIRECTIVE DISCUSSED WITH PATIENT: ICD-10-CM

## 2025-03-13 DIAGNOSIS — Z86.19 HISTORY OF HEPATITIS C: ICD-10-CM

## 2025-03-13 PROBLEM — L97.411: Status: RESOLVED | Noted: 2023-02-02 | Resolved: 2025-03-13

## 2025-03-13 PROCEDURE — 99999 PR PBB SHADOW E&M-EST. PATIENT-LVL V: CPT | Mod: PBBFAC,HCNC,,

## 2025-03-13 NOTE — PATIENT INSTRUCTIONS
"Counseling and Referral of Other Preventative  (Italic type indicates deductible and co-insurance are waived)    Patient Name: Tommy Keith  Today's Date: 3/13/2025    Weight Loss  Calorie Restriction: The "[employer]" grover may be very helpful in counting calories to reach the targets described below.  -- Calorie restriction is effective for weight loss, and has added benefits for lower blood pressure, lowering cholesterol, improving glycemic control, increasing mobility and reducing strain on joints.   -- Aim for a daily calorie deficit of 500 calories to lose about one pound per week (3500 calories per lb).  For example, if the average adult requires 2,000 calories per day, reduce intake to 1,500 calories per day to create this deficit.    -- Ensure daily intake does not drop below 1,200 calories per day in order to avoid nutrient deficiency  Protein Intake:  -- To protect lean muscle mass, aim for about 0.7 grams of protein per pound (1.5g/kg) of body weight. Above 100g of protein per day can help preserve lean muscle mass during calorie restriction  -- Great sources of protein:  chicken breast, lean meats/fish, whey protein (powder is much more affordable than pre-mixed protein drinks), egg whites       Fiber Supplementation:  -- Supplemental fiber intake can increase satiety (especially when taken with or right before meals), lower cholesterol, and improve glucose homeostasis.  It is also somewhat protective against colorectal cancer, and promotes regularity.  -- ** Supplemental should be accompanied by ample hydration (about 2.5-3 liters of water per day at least) **       -   Ground Flax Seed:  Flax is a gentle (modest) source of fiber which is Not likely to cause bloating.  Try 1 tbsp of ground flax seed with a shake/smoothie or added to oatmeal.  Nutty flavor, affordable, well-tolerated.      -   Psyllium Husk:  Excellent source of fiber, but start slowly!  Start with about 1 tsp per day taken with a " glass (8oz) of water, and slowly increase to 1-3 tbsp per day before or with meals as tolerated.  -- Focus on regular intake of vegetables, which are generally high in fiber and low in calories (root vegetables generally being the exception      Carbohydrate Management:  -- Choose higher-fiber, lower-carbohydrate meals to control hunger and appetite. Opt for carbs high in fiber and low in added sugar, such as beans and sweet potatoes, and avoid sugary drinks and chips  Physical Activity:  -- Incorporate physical activity to enhance insulin sensitivity and slightly assist with achieving a calorie deficit.  Aim for strength-training activities at least two days per week, with 2-3 days of moderate to vigorous cardiovascular training (brisk walking, cycling, swimming).  Sodium and Hypertension:  -- Limit sodium addition to cooked or prepared foods.  Watch out for the very high sodium content in canned / packaged foods.    In Summary:  Focus on regular intake of vegetables, lean protein (i.e. chicken breast, turkey, fish), and healthy fats (i.e olive oil, avocados, nuts) to minimize hunger and avoid nutrient deficiency while restricting calories. Additionally, strive to limit sodium intake, increase fiber intake and incorporate regular physical activity to help preserve muscle mass as you lose weight.           Health Maintenance         Date Due Completion Date    RSV Vaccine (Age 60+ and Pregnant patients) (1 - Risk 60-74 years 1-dose series) Never done ---    COVID-19 Vaccine (3 - 2024-25 season) 09/01/2024 8/20/2021    Hemoglobin A1c (Prediabetes) 12/04/2025 12/4/2024    Colorectal Cancer Screening 11/16/2028 11/16/2023    Lipid Panel 12/04/2029 12/4/2024    TETANUS VACCINE 06/29/2031 6/29/2021          No orders of the defined types were placed in this encounter.      The following information is provided to all patients.  This information is to help you find resources for any of the problems found today that may be  affecting your health:                  Living healthy guide: www.[facility].[place].[facility]      Understanding Diabetes: www.diabetes.org      Eating healthy: www.cdc.gov/healthyweight      CDC home safety checklist: www.cdc.gov/steadi/patient.html      Agency on Aging: www.goea.[place].[facility]      Alcoholics anonymous (AA): www.aa.org      Physical Activity: www.royal.nih.gov/kv6xpzm      Tobacco use: www.quitwithusla.org

## 2025-03-13 NOTE — PROGRESS NOTES
"  Tommy Keith presented for a follow-up Medicare AWV today. The following components were reviewed and updated:    Medical history  Family History  Social history  Allergies and Current Medications  Health Risk Assessment  Health Maintenance  Care Team    **See Completed Assessments for Annual Wellness visit with in the encounter summary    The following assessments were completed:  Depression Screening  Cognitive function Screening  Timed Get Up Test  Whisper Test      Opioid documentation:      Patient does not have a current opioid prescription.          Vitals:    03/13/25 0859   BP: 130/82   Pulse: 84   Temp: 98.3 °F (36.8 °C)   TempSrc: Oral   SpO2: 97%   Weight: 97.8 kg (215 lb 11.5 oz)   Height: 5' 7" (1.702 m)     Body mass index is 33.79 kg/m².          Physical Exam   Vital signs reviewed.   Body mass index is 33.79 kg/m².   General:  Well-developed, well-nourished. NAD.   Skin:  Warm, dry.   Eyes:  Conjunctivae w/o exudates or hemorrhage.  Non-icteric sclerae.    Heart:  Normal S1 & S2.  No extra heart sounds.    Lungs:  CTAB without rales, rhonchi or wheezing.  Breathing comfortably on RA.  Extremities:  Radial pulses 2+ and symmetric  Neuro:  A&O4.  No obvious focal deficits. Negative Snyder's sign.    Psychiatric:  Appropriate affect.    Clock:       Assessment & Plan     Encounter for Medicare annual wellness exam  Pt was seen today for an Annual Wellness visit.  Healthcare maintenance and screening recommendations were discussed and updated as indicated.  Patient asked to return in one year for routine AWV.  -- Last saw PCP 12/2024.  Has follow up scheduled for 06/2025    Advance directive discussed with patient  I offered to discuss advanced care planning, including how to pick a person who would make decisions for you if you were unable to make them for yourself, called a health care power of , and what kind of decisions you might make such as use of life sustaining treatments such " as ventilators and tube feeding when faced with a life limiting illness recorded on a living will that they will need to know. (How you want to be cared for as you near the end of your natural life)  -- Patient is interested in learning more about how to make advanced directives.  I provided them paperwork and discussed the rationale and logistics of its completion and return at next OV.      Bronchiectasis without complication  Noted during workup for COVID.  Stable without dyspnea or SOB.  Will continue to monitor    Class 1 obesity due to excess calories with serious comorbidity and body mass index (BMI) of 33.0 to 33.9 in adult  Wt Readings from Last 4 Encounters:   03/13/25 97.8 kg (215 lb 11.5 oz)   12/03/24 97.1 kg (214 lb 1.1 oz)   03/05/24 94.3 kg (208 lb)   02/01/24 94.5 kg (208 lb 5.4 oz)   -- Discussed the role of weight loss in cardiovascular, metabolic and orthopedic risk reduction  -- Discussed the mechanics of weight loss in detail.  Patient expresses understanding and will attempt to make the changes discussed today.  Recommendations reiterated in AVS.    Mixed hyperlipidemia  Mild LDL and total cholesterol elevation per most recent labs.  Discussed weight loss as above.     Sciatica of left side  Stable on meloxicam.  Will continue to monitor.  Weight loss discussed as above.    History of BPH  S/p TURP.  Aware that testosterone supplementation can worsen this issue.  Will continue to monitor.    History of hepatitis C  S/p antiviral therapy.  Recent labs encouraging.  Will continue to monitor    Low serum testosterone level  History of low T.  Receiving supplemental Testosterone shots.  Reports good relief of low energy on this medication.  Discussed possible contribution to prostatism as above.  Will continue to monitor.     Cervical spondylosis without myelopathy  Stable.  Denies B/B incontinence.  Will continue to monitor.     Diverticulitis  Recently hospitalized for this.  Discussed the role  of dietary fiber in prevention.  Has f/u with GI next week with [facility].  Will continue to monitor.       Provided [place] with a 5-10 year written screening schedule and personal prevention plan. Recommendations were developed using the USPSTF age appropriate recommendations. Education, counseling, and referrals were provided as needed.  After Visit Summary printed and given to patient which includes a list of additional screenings\tests needed.    No follow-ups on file.      Santana Elliott PA-C

## 2025-04-11 ENCOUNTER — TELEPHONE (OUTPATIENT)
Dept: FAMILY MEDICINE | Facility: CLINIC | Age: 75
End: 2025-04-11
Payer: MEDICARE

## 2025-04-11 RX ORDER — TOPIRAMATE 25 MG/1
75 TABLET ORAL DAILY
Qty: 270 TABLET | Refills: 1 | Status: SHIPPED | OUTPATIENT
Start: 2025-04-11 | End: 2025-07-10

## 2025-04-11 NOTE — TELEPHONE ENCOUNTER
----- Message from Aby sent at 4/10/2025  3:21 PM CDT -----  Who Called:selg  Refill or New Rx:refill  RX Name and Strength:topiramate (TOPAMAX) 25 MG tablet       Is this a 30 day or 90 day RX:  Preferred Pharmacy with phone number:[facility] #[id number] [facility] [place] - [facility] [facility] [address] [phone]  Would the patient rather a call back or a response via [facility]?callback   Best Call Back Number:[phone] pt would like for you to refill this medication pt says he wellness doctor took it off

## 2025-06-03 ENCOUNTER — OFFICE VISIT (OUTPATIENT)
Dept: FAMILY MEDICINE | Facility: CLINIC | Age: 75
End: 2025-06-03
Payer: MEDICARE

## 2025-06-03 VITALS
SYSTOLIC BLOOD PRESSURE: 130 MMHG | OXYGEN SATURATION: 100 % | WEIGHT: 211 LBS | DIASTOLIC BLOOD PRESSURE: 88 MMHG | HEART RATE: 68 BPM | HEIGHT: 67 IN | BODY MASS INDEX: 33.12 KG/M2

## 2025-06-03 DIAGNOSIS — Z82.49 FAMILY HISTORY OF HEART DISEASE IN BROTHER: ICD-10-CM

## 2025-06-03 DIAGNOSIS — E66.811 CLASS 1 OBESITY DUE TO EXCESS CALORIES WITH SERIOUS COMORBIDITY AND BODY MASS INDEX (BMI) OF 33.0 TO 33.9 IN ADULT: ICD-10-CM

## 2025-06-03 DIAGNOSIS — Z01.818 PRE-OP EXAMINATION: ICD-10-CM

## 2025-06-03 DIAGNOSIS — N40.1 BENIGN PROSTATIC HYPERPLASIA WITH LOWER URINARY TRACT SYMPTOMS, SYMPTOM DETAILS UNSPECIFIED: ICD-10-CM

## 2025-06-03 DIAGNOSIS — E66.09 CLASS 1 OBESITY DUE TO EXCESS CALORIES WITH SERIOUS COMORBIDITY AND BODY MASS INDEX (BMI) OF 33.0 TO 33.9 IN ADULT: ICD-10-CM

## 2025-06-03 DIAGNOSIS — I70.0 ATHEROSCLEROSIS OF AORTA: Primary | ICD-10-CM

## 2025-06-03 DIAGNOSIS — R73.03 PREDIABETES: ICD-10-CM

## 2025-06-03 DIAGNOSIS — E78.2 MIXED HYPERLIPIDEMIA: ICD-10-CM

## 2025-06-03 DIAGNOSIS — M51.26 DISPLACEMENT OF LUMBAR INTERVERTEBRAL DISC WITHOUT MYELOPATHY: ICD-10-CM

## 2025-06-03 PROCEDURE — 99214 OFFICE O/P EST MOD 30 MIN: CPT | Mod: S$GLB,,, | Performed by: FAMILY MEDICINE

## 2025-06-03 PROCEDURE — 93010 ELECTROCARDIOGRAM REPORT: CPT | Mod: S$GLB,,, | Performed by: INTERNAL MEDICINE

## 2025-06-03 PROCEDURE — 3008F BODY MASS INDEX DOCD: CPT | Mod: CPTII,S$GLB,, | Performed by: FAMILY MEDICINE

## 2025-06-03 PROCEDURE — 93005 ELECTROCARDIOGRAM TRACING: CPT | Mod: S$GLB,,, | Performed by: FAMILY MEDICINE

## 2025-06-03 PROCEDURE — 1159F MED LIST DOCD IN RCRD: CPT | Mod: CPTII,S$GLB,, | Performed by: FAMILY MEDICINE

## 2025-06-03 PROCEDURE — 1101F PT FALLS ASSESS-DOCD LE1/YR: CPT | Mod: CPTII,S$GLB,, | Performed by: FAMILY MEDICINE

## 2025-06-03 PROCEDURE — 3075F SYST BP GE 130 - 139MM HG: CPT | Mod: CPTII,S$GLB,, | Performed by: FAMILY MEDICINE

## 2025-06-03 PROCEDURE — 3079F DIAST BP 80-89 MM HG: CPT | Mod: CPTII,S$GLB,, | Performed by: FAMILY MEDICINE

## 2025-06-03 PROCEDURE — 1160F RVW MEDS BY RX/DR IN RCRD: CPT | Mod: CPTII,S$GLB,, | Performed by: FAMILY MEDICINE

## 2025-06-03 PROCEDURE — 99999 PR PBB SHADOW E&M-EST. PATIENT-LVL IV: CPT | Mod: PBBFAC,,, | Performed by: FAMILY MEDICINE

## 2025-06-03 PROCEDURE — 3288F FALL RISK ASSESSMENT DOCD: CPT | Mod: CPTII,S$GLB,, | Performed by: FAMILY MEDICINE

## 2025-06-03 PROCEDURE — G2211 COMPLEX E/M VISIT ADD ON: HCPCS | Mod: S$GLB,,, | Performed by: FAMILY MEDICINE

## 2025-06-03 RX ORDER — TAMSULOSIN HYDROCHLORIDE 0.4 MG/1
1 CAPSULE ORAL NIGHTLY
COMMUNITY
Start: 2025-05-16

## 2025-06-03 RX ORDER — OXYBUTYNIN CHLORIDE 5 MG/1
5 TABLET, EXTENDED RELEASE ORAL
COMMUNITY
Start: 2025-05-30 | End: 2026-05-30

## 2025-06-03 RX ORDER — LOTILANER OPHTHALMIC SOLUTION 2.5 MG/ML
1 SOLUTION/ DROPS OPHTHALMIC 2 TIMES DAILY
COMMUNITY
Start: 2025-05-29

## 2025-06-05 LAB
OHS QRS DURATION: 92 MS
OHS QTC CALCULATION: 382 MS

## 2025-06-16 ENCOUNTER — TELEPHONE (OUTPATIENT)
Dept: FAMILY MEDICINE | Facility: CLINIC | Age: 75
End: 2025-06-16
Payer: MEDICARE

## 2025-06-16 ENCOUNTER — OFFICE VISIT (OUTPATIENT)
Dept: CARDIOLOGY | Facility: CLINIC | Age: 75
End: 2025-06-16
Payer: MEDICARE

## 2025-06-16 VITALS
DIASTOLIC BLOOD PRESSURE: 94 MMHG | HEIGHT: 67 IN | RESPIRATION RATE: 18 BRPM | WEIGHT: 214.5 LBS | SYSTOLIC BLOOD PRESSURE: 142 MMHG | BODY MASS INDEX: 33.67 KG/M2 | OXYGEN SATURATION: 95 % | HEART RATE: 87 BPM

## 2025-06-16 DIAGNOSIS — Z01.818 PRE-OP EXAM: Primary | ICD-10-CM

## 2025-06-16 DIAGNOSIS — E66.811 OBESITY, CLASS 1: ICD-10-CM

## 2025-06-16 DIAGNOSIS — R06.02 EXERTIONAL SHORTNESS OF BREATH: ICD-10-CM

## 2025-06-16 DIAGNOSIS — Z82.49 FAMILY HISTORY OF HEART DISEASE IN BROTHER: ICD-10-CM

## 2025-06-16 DIAGNOSIS — I70.0 AORTIC ATHEROSCLEROSIS: ICD-10-CM

## 2025-06-16 DIAGNOSIS — E78.5 HYPERLIPIDEMIA, UNSPECIFIED HYPERLIPIDEMIA TYPE: ICD-10-CM

## 2025-06-16 PROCEDURE — 3288F FALL RISK ASSESSMENT DOCD: CPT | Mod: CPTII,S$GLB,, | Performed by: INTERNAL MEDICINE

## 2025-06-16 PROCEDURE — 3080F DIAST BP >= 90 MM HG: CPT | Mod: CPTII,S$GLB,, | Performed by: INTERNAL MEDICINE

## 2025-06-16 PROCEDURE — 99999 PR PBB SHADOW E&M-EST. PATIENT-LVL V: CPT | Mod: PBBFAC,HCNC,, | Performed by: INTERNAL MEDICINE

## 2025-06-16 PROCEDURE — 99204 OFFICE O/P NEW MOD 45 MIN: CPT | Mod: S$GLB,,, | Performed by: INTERNAL MEDICINE

## 2025-06-16 PROCEDURE — 1159F MED LIST DOCD IN RCRD: CPT | Mod: CPTII,S$GLB,, | Performed by: INTERNAL MEDICINE

## 2025-06-16 PROCEDURE — 1126F AMNT PAIN NOTED NONE PRSNT: CPT | Mod: CPTII,S$GLB,, | Performed by: INTERNAL MEDICINE

## 2025-06-16 PROCEDURE — G2211 COMPLEX E/M VISIT ADD ON: HCPCS | Mod: S$GLB,,, | Performed by: INTERNAL MEDICINE

## 2025-06-16 PROCEDURE — 3077F SYST BP >= 140 MM HG: CPT | Mod: CPTII,S$GLB,, | Performed by: INTERNAL MEDICINE

## 2025-06-16 PROCEDURE — 3008F BODY MASS INDEX DOCD: CPT | Mod: CPTII,S$GLB,, | Performed by: INTERNAL MEDICINE

## 2025-06-16 PROCEDURE — 1101F PT FALLS ASSESS-DOCD LE1/YR: CPT | Mod: CPTII,S$GLB,, | Performed by: INTERNAL MEDICINE

## 2025-06-16 NOTE — PROGRESS NOTES
CARDIOVASCULAR PROGRESS NOTE    REASON FOR CONSULT:   Tommy Keith is a 74 y.o. male who presents for establishment of cardiology care.    He used to follow with Dr. Bowling in the last office visit was in April, 2021.    CARDIOVASCULAR HISTORY:     Hyperlipidemia -  in December, 2024  Mildly dilated aortic root  Mild aortic calcification - 3.7 cm in May 2018 on echo  Obesity class 1  Migraine headache    HISTORY OF PRESENT ILLNESS:     He is coming to Cardiology Clinic for preop risk stratification prior to prostate surgery which is scheduled on 26th June 2025.  Prostate surgery is being done for BPH.  He had a prostate surgery done 25 years ago and he did not have any prostate issues after that until now.    At baseline he is fairly active and denies getting any chest pain or palpitations.  On strenuous activities, he gets exertional shortness of breath which has been stable for the last 1 year or so.    He does not have hypertension or diabetes mellitus.  No orthopnea or PND.  No lower extremity edema.    He does not smoke cigarettes.  He does not drink alcohol.    PAST MEDICAL HISTORY:     Past Medical History:   Diagnosis Date    Asthma, intermittent     irritant induced    History of BPH     s/p laser TURP    History of hepatitis C     s/p treatment, in remission    History of migraine headaches     History of multiple pulmonary nodules     Hyperlipidemia     Low serum testosterone level     Migraine headache     OA (osteoarthritis)     Obesity     Sciatica of left side     Spinal stenosis        PAST SURGICAL HISTORY:     Past Surgical History:   Procedure Laterality Date    arthroscopy of both knees      COLONOSCOPY N/A 4/20/2018    Procedure: COLONOSCOPY;  Surgeon: Jovon Nunez MD;  Location: [facility];  Service: Endoscopy;  Laterality: N/A;    left inguinal hernia repair      LIVER BIOPSY      tonsillectomy      TONSILLECTOMY      UMBILICAL HERNIA REPAIR      VENTRAL HERNIA REPAIR          ALLERGIES AND MEDICATION:     Review of patient's allergies indicates:   Allergen Reactions    Levaquin [levofloxacin]         Medication List            Accurate as of June 16, 2025  8:14 AM. If you have any questions, ask your nurse or doctor.                CONTINUE taking these medications      aspirin 81 MG EC tablet  Commonly known as: ECOTRIN     atorvastatin 80 MG tablet  Commonly known as: LIPITOR  TAKE 1 TABLET BY MOUTH EVERY DAY IN THE EVENING     azelastine 137 mcg (0.1 %) nasal spray  Commonly known as: ASTELIN  1 spray (137 mcg total) by Nasal route 2 (two) times daily.     CENTRUM SILVER ORAL     cyanocobalamin 100 MCG tablet  Commonly known as: VITAMIN B-12     diclofenac sodium 1 % Gel  Commonly known as: VOLTAREN  Apply 2 g topically 2 (two) times daily as needed (pain).     fish oil-omega-3 fatty acids 300-1,000 mg capsule     fluticasone propionate 50 mcg/actuation nasal spray  Commonly known as: FLONASE  2 sprays (100 mcg total) by Each Nostril route once daily.     glucosamine-chondroitin 500-400 mg tablet     KRILL OIL ORAL     meloxicam 7.5 MG tablet  Commonly known as: MOBIC  Take 1 tablet (7.5 mg total) by mouth daily as needed for Pain.     oxybutynin 5 MG Tr24  Commonly known as: DITROPAN-XL     sumatriptan 6 mg/0.5 mL kit  Commonly known as: IMITREX STATDOSE  USE AS DIRECTED MAY REPEAT IN 1 HR MAX 2 DOSES PER 24 HOURS     tamsulosin 0.4 mg Cap  Commonly known as: FLOMAX     testosterone cypionate 200 mg/mL injection  Commonly known as: DEPOTESTOTERONE CYPIONATE     topiramate 25 MG tablet  Commonly known as: TOPAMAX  Take 3 tablets (75 mg total) by mouth once daily.     XDEMVY 0.25 % Drop  Generic drug: lotilaner              SOCIAL HISTORY:   Social History[1]    FAMILY HISTORY:     Family History   Problem Relation Name Age of Onset    Heart disease Father      Clotting disorder Brother x 2     Diabetes Brother x 2     Hyperlipidemia Brother x 2     Cancer Brother x 2      "Diabetes Sister x 4     Hyperlipidemia Sister x 4        REVIEW OF SYSTEMS:   Review of Systems   Constitutional:  Negative for chills and fever.   HENT:  Negative for hearing loss and tinnitus.    Eyes:  Negative for blurred vision and double vision.   Respiratory:  Positive for shortness of breath.    Cardiovascular:  Negative for chest pain and palpitations.   Gastrointestinal:  Negative for heartburn and nausea.   Genitourinary:  Negative for dysuria and urgency.   Musculoskeletal:  Negative for myalgias and neck pain.   Neurological:  Negative for dizziness and headaches.         PHYSICAL EXAM:     Vitals:    06/16/25 0809   BP: (!) 142/94   Pulse: 87   Resp: 18    Body mass index is 33.6 kg/m².  Weight: 97.3 kg (214 lb 8.1 oz)   Height: 5' 7" (170.2 cm)     Gen: NAD  Head/Eyes/Ears/Nose: MMM, good dentition   Neck: No carotid bruits, no JVD  Lung: Clear to auscultation bilaterally, no wheezes/rales/ronchi, symmetrical lung expansion with inspiration  Heart: Normal S1/S2, regular rate and rhythm, no murmurs/rubs/gallops  Abdomen: Soft, NT/ND, no masses  Extremities: No lower extremity edema.  No wounds or other skin lesions  Skin: Normal color and turgor. No wounds rashes, no petechia, no ecchymoses.   Neuro: AAOx3    DATA:     Laboratory:  CBC:  Recent Labs   Lab 10/13/22  0937 12/05/23  0900 12/04/24  0915   WBC 5.37 7.21 8.38   Hemoglobin 16.0 15.8 16.5   Hematocrit 48.7 45.9 50.7   Platelets 236 235 238       CHEMISTRIES:  Recent Labs   Lab 10/13/22  0937 06/21/23  1102 12/05/23  0900 12/04/24  0915   Glucose 96 89 101 88   Sodium 136 137 141 138   Potassium 4.6 4.9 4.1 4.5   BUN 23  --  26 H 19   Blood Urea Nitrogen  --  17  --   --    Creatinine 1.1 0.90 0.9 0.9   eGFR >60.0 92 >60.0 >60.0   Calcium 9.7 10.2 10.1 10.0       CARDIAC BIOMARKERS:        HBA1C:  Hemoglobin A1C   Date Value Ref Range Status   12/04/2024 5.5 4.0 - 5.6 % Final     Comment:     ADA Screening Guidelines:  5.7-6.4%  Consistent with " prediabetes  >or=6.5%  Consistent with diabetes    High levels of fetal hemoglobin interfere with the HbA1C  assay. Heterozygous hemoglobin variants (HbS, HgC, etc)do  not significantly interfere with this assay.   However, presence of multiple variants may affect accuracy.     2023 5.3 4.0 - 5.6 % Final     Comment:     ADA Screening Guidelines:  5.7-6.4%  Consistent with prediabetes  >or=6.5%  Consistent with diabetes    High levels of fetal hemoglobin interfere with the HbA1C  assay. Heterozygous hemoglobin variants (HbS, HgC, etc)do  not significantly interfere with this assay.   However, presence of multiple variants may affect accuracy.     10/13/2022 5.3 4.0 - 5.6 % Final     Comment:     ADA Screening Guidelines:  5.7-6.4%  Consistent with prediabetes  >or=6.5%  Consistent with diabetes    High levels of fetal hemoglobin interfere with the HbA1C  assay. Heterozygous hemoglobin variants (HbS, HgC, etc)do  not significantly interfere with this assay.   However, presence of multiple variants may affect accuracy.          COAGS:        LIPIDS/LFTS:  Recent Labs   Lab 10/13/22  0937 23  0900 24  0915   Cholesterol 183 216 H 224 H   Triglycerides 61 92 115   HDL 33 L 34 L 38 L   LDL Cholesterol 137.8 163.6 H 163.0 H   Non-HDL Cholesterol 150 182 186   AST 41 H 40 42 H   ALT 41 59 H 57 H         CARDIAC DIAGNOSTICS:  :     [id number]  Sinus rhythm without any ST-T wave change    ECHO  2020  Normal left ventricular systolic function. The estimated ejection fraction is 60%.  Mild concentric left ventricular hypertrophy.  Grade I (mild) left ventricular diastolic dysfunction consistent with impaired relaxation.  Normal right ventricular systolic function.  Moderate left atrial enlargement.  Mild tricuspid regurgitation.  Normal central venous pressure (3 mmHg).  The estimated PA systolic pressure is 35 mmHg.    3.  STRESS TEST  Stress echo in   The left ventricle is normal in size  with concentric hypertrophy and normal systolic function.  The estimated ejection fraction is 60%.  Indeterminate left ventricular diastolic function.  Normal right ventricular size with normal right ventricular systolic function.  Mild left atrial enlargement.  Normal central venous pressure (3 mmHg).  The estimated PA systolic pressure is 25 mmHg.  There were no arrhythmias during stress.  The patient's exercise capacity was above average.  The ECG portion of this study is negative for myocardial ischemia.  The stress echo portion of this study is negative for myocardial ischemia at 93% of age predicted maximal heart rate    4.  CARDIAC CATHETERIZATION  NA    5.  IMAGING   CT abdomen pelvis in January, 2018 showed minimal abdominal aortic calcification        ASSESSMENT:   Preop risk stratification prior to process surgery scheduled on 26 June 2026 for BPH  Hyperlipidemia -  in December, 2024  Mildly dilated aortic root  Mild aortic calcification - 3.7 cm in May 2018 on echo  Obesity class 1  Migraine headache  History of hepatitis-C status post treatment almost 30 years ago    Hemodynamically stable except for mildly elevated blood pressure (white coat hypertension).  He does have cardiovascular risk factors but all of them are stable.    He does have exertional shortness of breath but that has been stable for a year or so.    His RCRI score is 1 which means 6% chance of adverse outcome within 1st 30 days after surgery.    PLAN:   He is at moderate risk for this urological procedure.  No further cardiac testing is needed  Check nonurgent transthoracic echocardiogram and a stress test (for exertional shortness of breath).  Tests can be done after the surgery  Follow up on routine maintenance labs ordered by PCP  Continue with baby aspirin and atorvastatin 80 mg daily  Mediterranean diet and daily light exercise    Follow up in 6 months    Edward Resendez MD  [facility]    Visit  today included increased complexity associated with the care of the episodic problem preop risk stratification/hyperlipidemia addressed and managing the longitudinal care of the patient due to the serious and/or complex managed problem(s).    This note was created by combination of typed  and M-Modal dictation.   Transcription errors may be present.            [1]   Social History  Socioeconomic History    Marital status:      Spouse name: Deloris    Number of children: 1   Occupational History    Occupation: works at a chemical plant    Occupation: Retired   Tobacco Use    Smoking status: Former     Current packs/day: 0.00     Types: Cigarettes     Quit date: 2006     Years since quittin.8     Passive exposure: Past    Smokeless tobacco: Never   Substance and Sexual Activity    Alcohol use: No    Drug use: No    Sexual activity: Yes     Partners: Female   Social History Narrative    He is walking regularly.     Social Drivers of Health     Financial Resource Strain: Low Risk  (2025)    Received from [facility]    Overall Financial Resource Strain (CARDIA)     Difficulty of Paying Living Expenses: Not hard at all   Food Insecurity: No Food Insecurity (2025)    Received from [facility]    Hunger Vital Sign     Worried About Running Out of Food in the Last Year: Never true     Ran Out of Food in the Last Year: Never true   Transportation Needs: No Transportation Needs (2025)    Received from [facility]    PRAPARE - Transportation     Lack of Transportation (Medical): No     Lack of Transportation (Non-Medical): No   Physical Activity: Unknown (2025)    Received from [facility]    Exercise Vital Sign     Days of Exercise per Week: 3 days   Stress: No Stress Concern Present (2025)    Received from [facility]    [employer] - Occupational Stress Questionnaire     Feeling of Stress : Not at all   Housing Stability: Unknown (2025)     Received from [facility]    Housing Stability Vital Sign     Unable to Pay for Housing in the Last Year: No

## 2025-06-16 NOTE — TELEPHONE ENCOUNTER
Copied from CRM #[id number]. Topic: General Inquiry - Patient Advice  >> Jun 16, 2025  3:11 PM Dileep wrote:  .Type: Patient Call Back    Who called:self     What is the request in detail: pt would like a letter clearing him for sx     Can the clinic reply by [facility]? Call back     Would the patient rather a call back or a response via [facility]?  Call back     Best call back number:.post nasal drip      Additional Information:

## 2025-06-17 ENCOUNTER — TELEPHONE (OUTPATIENT)
Dept: FAMILY MEDICINE | Facility: CLINIC | Age: 75
End: 2025-06-17
Payer: MEDICARE

## 2025-06-17 NOTE — TELEPHONE ENCOUNTER
Spoke with patient informed that it says he is optimized for surgery on the copy from his last office visit. Patient verbalized understanding at this time.

## 2025-06-17 NOTE — TELEPHONE ENCOUNTER
Cardiac clearance was done yesterday.   Do they also need a medical clearance?  Where does surgery clearance need to be sent?

## 2025-06-17 NOTE — TELEPHONE ENCOUNTER
Copied from CRM #[id number]. Topic: General Inquiry - Information Request  >> Jun 17, 2025  3:51 PM Amy wrote:  Type: Patient Call Back    Who called: self    What is the request in detail: pt has questions about medical clearance paperwork , he's not finding it on his paperwork    Can the clinic reply by [facility]?    Would the patient rather a call back or a response via [facility]? call    Best call back number: [phone]      Additional Information:

## 2025-07-15 ENCOUNTER — HOSPITAL ENCOUNTER (OUTPATIENT)
Dept: RADIOLOGY | Facility: HOSPITAL | Age: 75
Discharge: HOME | End: 2025-07-15
Attending: INTERNAL MEDICINE
Payer: MEDICARE

## 2025-07-15 ENCOUNTER — HOSPITAL ENCOUNTER (OUTPATIENT)
Dept: CARDIOLOGY | Facility: HOSPITAL | Age: 75
Discharge: HOME | End: 2025-07-15
Attending: INTERNAL MEDICINE
Payer: MEDICARE

## 2025-07-15 DIAGNOSIS — Z82.49 FAMILY HISTORY OF HEART DISEASE IN BROTHER: ICD-10-CM

## 2025-07-15 DIAGNOSIS — E78.5 HYPERLIPIDEMIA, UNSPECIFIED HYPERLIPIDEMIA TYPE: ICD-10-CM

## 2025-07-15 DIAGNOSIS — R06.02 EXERTIONAL SHORTNESS OF BREATH: ICD-10-CM

## 2025-07-15 LAB
ASCENDING AORTA: 3.5 CM
AV INDEX (PROSTH): 0.96
AV MEAN GRADIENT: 5 MMHG
AV PEAK GRADIENT: 7 MMHG
AV VALVE AREA BY VELOCITY RATIO: 3.1 CM²
AV VALVE AREA: 3 CM²
AV VELOCITY RATIO: 1
CV ECHO LV RWT: 0.58 CM
CV STRESS BASE HR: 65 BPM
DIASTOLIC BLOOD PRESSURE: 99 MMHG
DOP CALC AO PEAK VEL: 1.3 M/S
DOP CALC AO VTI: 27.2 CM
DOP CALC LVOT AREA: 3.1 CM2
DOP CALC LVOT DIAMETER: 2 CM
DOP CALC LVOT PEAK VEL: 1.3 M/S
DOP CALC LVOT STROKE VOLUME: 81.6 CM3
DOP CALC MV VTI: 20.7 CM
DOP CALCLVOT PEAK VEL VTI: 26 CM
E WAVE DECELERATION TIME: 366 MSEC
E/A RATIO: 0.78
E/E' RATIO: 6 M/S
ECHO LV POSTERIOR WALL: 1.1 CM (ref 0.6–1.1)
FRACTIONAL SHORTENING: 28.9 % (ref 28–44)
INTERVENTRICULAR SEPTUM: 1.3 CM (ref 0.6–1.1)
IVC DIAMETER: 2.28 CM
LA MAJOR: 6.3 CM
LA MINOR: 6.2 CM
LA WIDTH: 3.8 CM
LEFT ATRIUM SIZE: 4.6 CM
LEFT ATRIUM VOLUME: 93 CM3
LEFT INTERNAL DIMENSION IN SYSTOLE: 2.7 CM (ref 2.1–4)
LEFT VENTRICLE DIASTOLIC VOLUME: 61 ML
LEFT VENTRICLE SYSTOLIC VOLUME: 28 ML
LEFT VENTRICULAR INTERNAL DIMENSION IN DIASTOLE: 3.8 CM (ref 3.5–6)
LEFT VENTRICULAR MASS: 153.2 G
LV LATERAL E/E' RATIO: 4.5 M/S
LV SEPTAL E/E' RATIO: 8.2 M/S
LVED V (TEICH): 60.81 ML
LVES V (TEICH): 28.08 ML
LVOT MG: 4.07 MMHG
LVOT MV: 0.96 CM/S
MV MEAN GRADIENT: 1 MMHG
MV PEAK A VEL: 0.63 M/S
MV PEAK E VEL: 0.49 M/S
MV PEAK GRADIENT: 3 MMHG
MV STENOSIS PRESSURE HALF TIME: 106.21 MS
MV VALVE AREA BY CONTINUITY EQUATION: 3.94 CM2
MV VALVE AREA P 1/2 METHOD: 2.07 CM2
NUC STRESS DIASTOLIC VOLUME INDEX: 76
NUC STRESS EJECTION FRACTION: 52 %
NUC STRESS SYSTOLIC VOLUME INDEX: 36
OHS CV CPX 85 PERCENT MAX PREDICTED HEART RATE MALE: 125
OHS CV CPX MAX PREDICTED HEART RATE: 147
OHS CV CPX PATIENT IS FEMALE: 0
OHS CV CPX PATIENT IS MALE: 1
OHS CV CPX PEAK DIASTOLIC BLOOD PRESSURE: 84 MMHG
OHS CV CPX PEAK HEAR RATE: 89 BPM
OHS CV CPX PEAK RATE PRESSURE PRODUCT: NORMAL
OHS CV CPX PEAK SYSTOLIC BLOOD PRESSURE: 113 MMHG
OHS CV CPX PERCENT MAX PREDICTED HEART RATE ACHIEVED: 61
OHS CV CPX RATE PRESSURE PRODUCT PRESENTING: 8905
OHS CV INITIAL DOSE: 10.3 MCG/KG/MIN
OHS CV PEAK DOSE: 30.3 MCG/KG/MIN
OHS CV RV/LV RATIO: 0.95 CM
PISA TR MAX VEL: 2.3 M/S
PV PEAK GRADIENT: 3 MMHG
PV PEAK VELOCITY: 0.93 M/S
RA MAJOR: 5.73 CM
RA PRESSURE ESTIMATED: 8 MMHG
RA WIDTH: 3.7 CM
RIGHT VENTRICLE DIASTOLIC BASEL DIMENSION: 3.6 CM
RIGHT VENTRICULAR END-DIASTOLIC DIMENSION: 3.55 CM
RV TB RVSP: 10 MMHG
RV TISSUE DOPPLER FREE WALL SYSTOLIC VELOCITY 1 (APICAL 4 CHAMBER VIEW): 6.66 CM/S
SINUS: 3.9 CM
STJ: 2.9 CM
SYSTOLIC BLOOD PRESSURE: 137 MMHG
TDI LATERAL: 0.11 M/S
TDI SEPTAL: 0.06 M/S
TDI: 0.09 M/S
TR MAX PG: 22 MMHG
TRICUSPID ANNULAR PLANE SYSTOLIC EXCURSION: 1.5 CM
TV REST PULMONARY ARTERY PRESSURE: 29 MMHG

## 2025-07-15 PROCEDURE — 63600175 PHARM REV CODE 636 W HCPCS: Mod: HCNC | Performed by: INTERNAL MEDICINE

## 2025-07-15 PROCEDURE — 93017 CV STRESS TEST TRACING ONLY: CPT | Mod: HCNC

## 2025-07-15 PROCEDURE — 78452 HT MUSCLE IMAGE SPECT MULT: CPT | Mod: 26,HCNC,, | Performed by: INTERNAL MEDICINE

## 2025-07-15 PROCEDURE — A9502 TC99M TETROFOSMIN: HCPCS | Mod: HCNC | Performed by: INTERNAL MEDICINE

## 2025-07-15 PROCEDURE — 93018 CV STRESS TEST I&R ONLY: CPT | Mod: HCNC,,, | Performed by: INTERNAL MEDICINE

## 2025-07-15 PROCEDURE — 93306 TTE W/DOPPLER COMPLETE: CPT | Mod: 26,HCNC,, | Performed by: INTERNAL MEDICINE

## 2025-07-15 PROCEDURE — 93016 CV STRESS TEST SUPVJ ONLY: CPT | Mod: HCNC,,, | Performed by: INTERNAL MEDICINE

## 2025-07-15 PROCEDURE — 78452 HT MUSCLE IMAGE SPECT MULT: CPT | Mod: HCNC

## 2025-07-15 PROCEDURE — 93306 TTE W/DOPPLER COMPLETE: CPT | Mod: HCNC

## 2025-07-15 RX ORDER — REGADENOSON 0.08 MG/ML
0.4 INJECTION, SOLUTION INTRAVENOUS ONCE
Status: COMPLETED | OUTPATIENT
Start: 2025-07-15 | End: 2025-07-15

## 2025-07-15 RX ADMIN — REGADENOSON 0.4 MG: 0.08 INJECTION, SOLUTION INTRAVENOUS at 08:07

## 2025-07-15 RX ADMIN — TETROFOSMIN 10.3 MILLICURIE: 1.38 INJECTION, POWDER, LYOPHILIZED, FOR SOLUTION INTRAVENOUS at 07:07

## 2025-07-15 RX ADMIN — TETROFOSMIN 30.3 MILLICURIE: 1.38 INJECTION, POWDER, LYOPHILIZED, FOR SOLUTION INTRAVENOUS at 08:07

## 2025-07-24 ENCOUNTER — OFFICE VISIT (OUTPATIENT)
Dept: FAMILY MEDICINE | Facility: CLINIC | Age: 75
End: 2025-07-24
Payer: MEDICARE

## 2025-07-24 ENCOUNTER — PATIENT MESSAGE (OUTPATIENT)
Dept: FAMILY MEDICINE | Facility: CLINIC | Age: 75
End: 2025-07-24
Payer: MEDICARE

## 2025-07-24 VITALS
DIASTOLIC BLOOD PRESSURE: 90 MMHG | OXYGEN SATURATION: 98 % | HEIGHT: 67 IN | BODY MASS INDEX: 33.08 KG/M2 | SYSTOLIC BLOOD PRESSURE: 140 MMHG | WEIGHT: 210.75 LBS | TEMPERATURE: 99 F | HEART RATE: 88 BPM | RESPIRATION RATE: 18 BRPM

## 2025-07-24 DIAGNOSIS — J06.9 VIRAL UPPER RESPIRATORY TRACT INFECTION: Primary | ICD-10-CM

## 2025-07-24 LAB
CTP QC/QA: YES
POC MOLECULAR INFLUENZA A AGN: NEGATIVE
POC MOLECULAR INFLUENZA B AGN: NEGATIVE

## 2025-07-24 PROCEDURE — 3008F BODY MASS INDEX DOCD: CPT | Mod: CPTII,S$GLB,, | Performed by: FAMILY MEDICINE

## 2025-07-24 PROCEDURE — 99999 PR PBB SHADOW E&M-EST. PATIENT-LVL IV: CPT | Mod: PBBFAC,,, | Performed by: FAMILY MEDICINE

## 2025-07-24 PROCEDURE — 3288F FALL RISK ASSESSMENT DOCD: CPT | Mod: CPTII,S$GLB,, | Performed by: FAMILY MEDICINE

## 2025-07-24 PROCEDURE — 3077F SYST BP >= 140 MM HG: CPT | Mod: CPTII,S$GLB,, | Performed by: FAMILY MEDICINE

## 2025-07-24 PROCEDURE — 87502 INFLUENZA DNA AMP PROBE: CPT | Mod: QW,S$GLB,, | Performed by: FAMILY MEDICINE

## 2025-07-24 PROCEDURE — 3080F DIAST BP >= 90 MM HG: CPT | Mod: CPTII,S$GLB,, | Performed by: FAMILY MEDICINE

## 2025-07-24 PROCEDURE — 1125F AMNT PAIN NOTED PAIN PRSNT: CPT | Mod: CPTII,S$GLB,, | Performed by: FAMILY MEDICINE

## 2025-07-24 PROCEDURE — 99213 OFFICE O/P EST LOW 20 MIN: CPT | Mod: 25,S$GLB,, | Performed by: FAMILY MEDICINE

## 2025-07-24 PROCEDURE — 96372 THER/PROPH/DIAG INJ SC/IM: CPT | Mod: S$GLB,,, | Performed by: FAMILY MEDICINE

## 2025-07-24 PROCEDURE — 1159F MED LIST DOCD IN RCRD: CPT | Mod: CPTII,S$GLB,, | Performed by: FAMILY MEDICINE

## 2025-07-24 PROCEDURE — 1101F PT FALLS ASSESS-DOCD LE1/YR: CPT | Mod: CPTII,S$GLB,, | Performed by: FAMILY MEDICINE

## 2025-07-24 RX ORDER — KETOROLAC TROMETHAMINE 30 MG/ML
60 INJECTION, SOLUTION INTRAMUSCULAR; INTRAVENOUS
Status: COMPLETED | OUTPATIENT
Start: 2025-07-24 | End: 2025-07-24

## 2025-07-24 RX ORDER — ALBUTEROL SULFATE 90 UG/1
1-2 INHALANT RESPIRATORY (INHALATION)
Qty: 18 G | Refills: 0 | Status: SHIPPED | OUTPATIENT
Start: 2025-07-24

## 2025-07-24 RX ADMIN — KETOROLAC TROMETHAMINE 60 MG: 30 INJECTION, SOLUTION INTRAMUSCULAR; INTRAVENOUS at 05:07

## 2025-07-24 NOTE — PROGRESS NOTES
Assessment & Plan:    Viral upper respiratory tract infection  -     POCT Influenza A/B Molecular  -     Cancel: COVID-19 Routine Screening; Future  -     X-Ray Chest PA And Lateral; Future; Expected date: 07/24/2025  -     albuterol (PROVENTIL/VENTOLIN HFA) 90 mcg/actuation inhaler; Inhale 1-2 puffs into the lungs every 4 to 6 hours as needed for Wheezing or Shortness of Breath (chest tightness).  Dispense: 18 g; Refill: 0  -     ketorolac injection 60 mg  -     nirmatrelvir-ritonavir 300 mg (150 mg x 2)-100 mg copackaged tablets (EUA); Take 3 tablets by mouth 2 (two) times daily for 5 days. Each dose contains 2 nirmatrelvir (pink tablets) and 1 ritonavir (white tablet). Take all 3 tablets together  Dispense: 30 tablet; Refill: 0      Etiology most likely viral.   POC influenza: negative  COVID tests unavailable in the office. Patient's wife plans to give him a COVID test at home.  CXR to be performed to evaluate for PNA, given dyspnea.    Toradol given in the office to help with myalgia. Albuterol prn for dyspnea. May continue symptomatic treatment with rest, oral fluids, OTC cold medications, NSAIDs, Tylenol. May start Paxlovid if COVID test is positive at home.         Follow-up: Follow up if symptoms worsen or fail to improve.  ______________________________________________________________________    Chief Complaint  Chief Complaint   Patient presents with    Generalized Body Aches     Onset x1 day    Fatigue     Onset x1 day    Shortness of Breath     Onset x1 day    Chills     Onset x1 day    Fever     Onset x1 day       HPI  Tommy Keith is a 75 y.o. male with medical diagnoses as listed in the medical history and problem list that presents to the office for myalgias, fever Tmax >101F, and chills that began yesterday. Endorses headaches and dyspnea. Took Tylenol Cold and Flu with improvement in his temperature to normal range by the time of this encounter.     PAST MEDICAL HISTORY:  Past Medical  "History:   Diagnosis Date    Asthma, intermittent     irritant induced    History of BPH     s/p laser TURP    History of hepatitis C     s/p treatment, in remission    History of migraine headaches     History of multiple pulmonary nodules     Hyperlipidemia     Low serum testosterone level     Migraine headache     OA (osteoarthritis)     Obesity     Sciatica of left side     Spinal stenosis        PAST SURGICAL HISTORY:  Past Surgical History:   Procedure Laterality Date    arthroscopy of both knees      COLONOSCOPY N/A 04/20/2018    Procedure: COLONOSCOPY;  Surgeon: Jovon Nunez MD;  Location: [facility];  Service: Endoscopy;  Laterality: N/A;    left inguinal hernia repair      LIVER BIOPSY      PROSTATE SURGERY      tonsillectomy      TONSILLECTOMY      UMBILICAL HERNIA REPAIR      VENTRAL HERNIA REPAIR         SOCIAL HISTORY:  Social History[1]    FAMILY HISTORY:  Family History   Problem Relation Name Age of Onset    Heart disease Father      Clotting disorder Brother x 2     Diabetes Brother x 2     Hyperlipidemia Brother x 2     Cancer Brother x 2     Diabetes Sister x 4     Hyperlipidemia Sister x 4        ALLERGIES AND MEDICATIONS: updated and reviewed.  Review of patient's allergies indicates:   Allergen Reactions    Levaquin [levofloxacin]      Current Medications[2]      ROS  Review of Systems   Constitutional:  Positive for chills and fever.   HENT:  Negative for congestion, ear pain, postnasal drip, rhinorrhea, sinus pressure, sinus pain, sneezing and sore throat.    Respiratory:  Positive for shortness of breath. Negative for cough and wheezing.    Musculoskeletal:  Positive for myalgias.   Neurological:  Positive for headaches.   Hematological:  Negative for adenopathy.           Physical Exam  Vitals:    07/24/25 1615   BP: (!) 140/90   Patient Position: Sitting   Pulse: 88   Resp: 18   Temp: 98.8 °F (37.1 °C)   TempSrc: Oral   SpO2: 98%   Weight: 95.6 kg (210 lb 12.2 oz)   Height: 5' 7" (1.702 m) " "   Body mass index is 33.01 kg/m².  Weight: 95.6 kg (210 lb 12.2 oz)   Height: 5' 7" (170.2 cm)   Physical Exam  Constitutional:       General: He is not in acute distress.  HENT:      Head: Normocephalic and atraumatic.      Right Ear: Ear canal normal. Tympanic membrane is bulging. Tympanic membrane is not erythematous.      Left Ear: Tympanic membrane and ear canal normal.      Nose: Rhinorrhea present.      Mouth/Throat:      Mouth: Mucous membranes are moist.      Pharynx: Oropharynx is clear.   Eyes:      Conjunctiva/sclera: Conjunctivae normal.   Neck:      Thyroid: No thyromegaly.   Cardiovascular:      Rate and Rhythm: Normal rate and regular rhythm.      Pulses: Normal pulses.      Heart sounds: Normal heart sounds.   Pulmonary:      Effort: Pulmonary effort is normal. No respiratory distress.      Breath sounds: Normal breath sounds.   Musculoskeletal:      Cervical back: Neck supple.   Lymphadenopathy:      Cervical: Cervical adenopathy present.      Right cervical: Superficial cervical adenopathy present.      Left cervical: Superficial cervical adenopathy present.   Skin:     General: Skin is warm and dry.   Neurological:      General: No focal deficit present.      Mental Status: He is alert and oriented to person, place, and time.   Psychiatric:         Mood and Affect: Mood normal.         Behavior: Behavior normal.         Thought Content: Thought content normal.                  [1]   Social History  Socioeconomic History    Marital status:      Spouse name: Deloris    Number of children: 1   Occupational History    Occupation: works at a chemical plant    Occupation: Retired   Tobacco Use    Smoking status: Former     Current packs/day: 0.00     Types: Cigarettes     Quit date: 2006     Years since quittin.9     Passive exposure: Past    Smokeless tobacco: Never   Substance and Sexual Activity    Alcohol use: No    Drug use: No    Sexual activity: Yes     Partners: Female "   Social History Narrative    He is walking regularly.     Social Drivers of Health     Financial Resource Strain: Low Risk  (7/14/2025)    Overall Financial Resource Strain (CARDIA)     Difficulty of Paying Living Expenses: Not hard at all   Food Insecurity: No Food Insecurity (7/14/2025)    Hunger Vital Sign     Worried About Running Out of Food in the Last Year: Never true     Ran Out of Food in the Last Year: Never true   Transportation Needs: No Transportation Needs (7/14/2025)    PRAPARE - Transportation     Lack of Transportation (Medical): No     Lack of Transportation (Non-Medical): No   Physical Activity: Sufficiently Active (7/14/2025)    Exercise Vital Sign     Days of Exercise per Week: 3 days     Minutes of Exercise per Session: 60 min   Stress: No Stress Concern Present (5/30/2025)    Received from [facility] - Occupational Stress Questionnaire     Feeling of Stress : Not at all   Housing Stability: Unknown (7/14/2025)    Housing Stability Vital Sign     Unable to Pay for Housing in the Last Year: Patient declined     Number of Times Moved in the Last Year: 1     Homeless in the Last Year: No   [2]   Current Outpatient Medications   Medication Sig Dispense Refill    aspirin (ECOTRIN) 81 MG EC tablet Take 81 mg by mouth once daily.      atorvastatin (LIPITOR) 80 MG tablet TAKE 1 TABLET BY MOUTH EVERY DAY IN THE EVENING 90 tablet 3    azelastine (ASTELIN) 137 mcg (0.1 %) nasal spray 1 spray (137 mcg total) by Nasal route 2 (two) times daily. 30 mL 1    cyanocobalamin (VITAMIN B-12) 100 MCG tablet Take by mouth.      diclofenac sodium (VOLTAREN) 1 % Gel Apply 2 g topically 2 (two) times daily as needed (pain). 100 g 1    fluticasone propionate (FLONASE) 50 mcg/actuation nasal spray 2 sprays (100 mcg total) by Each Nostril route once daily. 48 mL 1    FOLIC ACID/MULTIVIT-MIN/LUTEIN (CENTRUM SILVER ORAL) Take by mouth.      glucosamine-chondroitin 500-400 mg  tablet Take 1 tablet by mouth 3 (three) times daily.      KRILL OIL ORAL Take by mouth.      meloxicam (MOBIC) 7.5 MG tablet Take 1 tablet (7.5 mg total) by mouth daily as needed for Pain. 90 tablet 3    omega-3 fatty acids/fish oil (FISH OIL-OMEGA-3 FATTY ACIDS) 300-1,000 mg capsule Take 1 capsule by mouth once daily.      oxybutynin (DITROPAN-XL) 5 MG TR24 Take 5 mg by mouth.      sumatriptan (IMITREX STATDOSE) 6 mg/0.5 mL kit USE AS DIRECTED MAY REPEAT IN 1 HR MAX 2 DOSES PER 24 HOURS 1 kit 3    tamsulosin (FLOMAX) 0.4 mg Cap Take 1 capsule by mouth every evening.      testosterone cypionate (DEPOTESTOTERONE CYPIONATE) 200 mg/mL injection Inject 200 mg into the muscle every 14 (fourteen) days.  3    XDEMVY 0.25 % Drop Place 1 drop into both eyes 2 (two) times a day.      albuterol (PROVENTIL/VENTOLIN HFA) 90 mcg/actuation inhaler Inhale 1-2 puffs into the lungs every 4 to 6 hours as needed for Wheezing or Shortness of Breath (chest tightness). 18 g 0    nirmatrelvir-ritonavir 300 mg (150 mg x 2)-100 mg copackaged tablets (EUA) Take 3 tablets by mouth 2 (two) times daily for 5 days. Each dose contains 2 nirmatrelvir (pink tablets) and 1 ritonavir (white tablet). Take all 3 tablets together 30 tablet 0    topiramate (TOPAMAX) 25 MG tablet Take 3 tablets (75 mg total) by mouth once daily. 270 tablet 1     No current facility-administered medications for this visit.

## 2025-07-25 ENCOUNTER — HOSPITAL ENCOUNTER (OUTPATIENT)
Dept: RADIOLOGY | Facility: HOSPITAL | Age: 75
Discharge: HOME | End: 2025-07-25
Attending: FAMILY MEDICINE
Payer: MEDICARE

## 2025-07-25 DIAGNOSIS — J06.9 UPPER RESPIRATORY TRACT INFECTION, UNSPECIFIED TYPE: ICD-10-CM

## 2025-07-25 PROCEDURE — 71046 X-RAY EXAM CHEST 2 VIEWS: CPT | Mod: TC,PO

## 2025-07-25 PROCEDURE — 71046 X-RAY EXAM CHEST 2 VIEWS: CPT | Mod: 26,,, | Performed by: RADIOLOGY

## 2025-08-07 ENCOUNTER — OFFICE VISIT (OUTPATIENT)
Dept: CARDIOLOGY | Facility: CLINIC | Age: 75
End: 2025-08-07
Payer: MEDICARE

## 2025-08-07 VITALS
HEART RATE: 82 BPM | HEIGHT: 67 IN | SYSTOLIC BLOOD PRESSURE: 118 MMHG | WEIGHT: 209.56 LBS | RESPIRATION RATE: 18 BRPM | BODY MASS INDEX: 32.89 KG/M2 | DIASTOLIC BLOOD PRESSURE: 78 MMHG | OXYGEN SATURATION: 97 %

## 2025-08-07 DIAGNOSIS — I70.0 ATHEROSCLEROSIS OF AORTA: ICD-10-CM

## 2025-08-07 DIAGNOSIS — E78.2 MIXED HYPERLIPIDEMIA: Primary | ICD-10-CM

## 2025-08-07 PROCEDURE — 1159F MED LIST DOCD IN RCRD: CPT | Mod: CPTII,HCNC,S$GLB, | Performed by: INTERNAL MEDICINE

## 2025-08-07 PROCEDURE — 3074F SYST BP LT 130 MM HG: CPT | Mod: CPTII,HCNC,S$GLB, | Performed by: INTERNAL MEDICINE

## 2025-08-07 PROCEDURE — 3288F FALL RISK ASSESSMENT DOCD: CPT | Mod: CPTII,HCNC,S$GLB, | Performed by: INTERNAL MEDICINE

## 2025-08-07 PROCEDURE — 3078F DIAST BP <80 MM HG: CPT | Mod: CPTII,HCNC,S$GLB, | Performed by: INTERNAL MEDICINE

## 2025-08-07 PROCEDURE — 1126F AMNT PAIN NOTED NONE PRSNT: CPT | Mod: CPTII,HCNC,S$GLB, | Performed by: INTERNAL MEDICINE

## 2025-08-07 PROCEDURE — 99999 PR PBB SHADOW E&M-EST. PATIENT-LVL IV: CPT | Mod: PBBFAC,HCNC,, | Performed by: INTERNAL MEDICINE

## 2025-08-07 PROCEDURE — 1101F PT FALLS ASSESS-DOCD LE1/YR: CPT | Mod: CPTII,HCNC,S$GLB, | Performed by: INTERNAL MEDICINE

## 2025-08-07 PROCEDURE — 99214 OFFICE O/P EST MOD 30 MIN: CPT | Mod: HCNC,S$GLB,, | Performed by: INTERNAL MEDICINE

## 2025-08-07 PROCEDURE — 3008F BODY MASS INDEX DOCD: CPT | Mod: CPTII,HCNC,S$GLB, | Performed by: INTERNAL MEDICINE

## 2025-08-07 NOTE — PROGRESS NOTES
CARDIOVASCULAR PROGRESS NOTE    REASON FOR CONSULT:   Tommy Keith is a 75 y.o. male who presents for follow visit.    He was last time seen in clinic in June, 2025.    He used to follow with Dr. Bowling in the last office visit was in April, 2021.    CARDIOVASCULAR HISTORY:     Hyperlipidemia -  in December, 2024  Mildly dilated aortic root - 3.7 cm (2018) -> 3.9 cm (2025)  Mild aortic calcification   Obesity class 1  Migraine headache    HISTORY OF PRESENT ILLNESS:     He was seen in cardiology clinic last time before prostate surgery for preop risk stratification.  He underwent uneventful prostate surgery.    He was also recommended to get nonurgent transthoracic echocardiogram and a stress test during last office visit.  Stress test did not show any abnormality.  Echocardiogram revealed normal LV systolic function and mildly dilated aortic root.    At baseline he is fairly active and denies getting any chest pain or palpitations.  On strenuous activities, he gets exertional shortness of breath which has been stable for the last 1 year or so.    He does not have hypertension or diabetes mellitus.  No orthopnea or PND.  No lower extremity edema.    He does not smoke cigarettes.  He does not drink alcohol.    PAST MEDICAL HISTORY:     Past Medical History:   Diagnosis Date    Asthma, intermittent     irritant induced    History of BPH     s/p laser TURP    History of hepatitis C     s/p treatment, in remission    History of migraine headaches     History of multiple pulmonary nodules     Hyperlipidemia     Low serum testosterone level     Migraine headache     OA (osteoarthritis)     Obesity     Sciatica of left side     Spinal stenosis        PAST SURGICAL HISTORY:     Past Surgical History:   Procedure Laterality Date    arthroscopy of both knees      COLONOSCOPY N/A 04/20/2018    Procedure: COLONOSCOPY;  Surgeon: Jovon Nunez MD;  Location: [facility];  Service: Endoscopy;  Laterality: N/A;    left  inguinal hernia repair      LIVER BIOPSY      PROSTATE SURGERY      tonsillectomy      TONSILLECTOMY      UMBILICAL HERNIA REPAIR      VENTRAL HERNIA REPAIR         ALLERGIES AND MEDICATION:     Review of patient's allergies indicates:   Allergen Reactions    Levaquin [levofloxacin]         Medication List            Accurate as of August 7, 2025  9:13 AM. If you have any questions, ask your nurse or doctor.                CONTINUE taking these medications      albuterol 90 mcg/actuation inhaler  Commonly known as: PROVENTIL/VENTOLIN HFA  Inhale 1-2 puffs into the lungs every 4 to 6 hours as needed for Wheezing or Shortness of Breath (chest tightness).     aspirin 81 MG EC tablet  Commonly known as: ECOTRIN     atorvastatin 80 MG tablet  Commonly known as: LIPITOR  TAKE 1 TABLET BY MOUTH EVERY DAY IN THE EVENING     azelastine 137 mcg (0.1 %) nasal spray  Commonly known as: ASTELIN  1 spray (137 mcg total) by Nasal route 2 (two) times daily.     CENTRUM SILVER ORAL     cyanocobalamin 100 MCG tablet  Commonly known as: VITAMIN B-12     diclofenac sodium 1 % Gel  Commonly known as: VOLTAREN  Apply 2 g topically 2 (two) times daily as needed (pain).     fish oil-omega-3 fatty acids 300-1,000 mg capsule     fluticasone propionate 50 mcg/actuation nasal spray  Commonly known as: FLONASE  2 sprays (100 mcg total) by Each Nostril route once daily.     glucosamine-chondroitin 500-400 mg tablet     KRILL OIL ORAL     meloxicam 7.5 MG tablet  Commonly known as: MOBIC  Take 1 tablet (7.5 mg total) by mouth daily as needed for Pain.     oxybutynin 5 MG Tr24  Commonly known as: DITROPAN-XL     sumatriptan 6 mg/0.5 mL kit  Commonly known as: IMITREX STATDOSE  USE AS DIRECTED MAY REPEAT IN 1 HR MAX 2 DOSES PER 24 HOURS     tamsulosin 0.4 mg Cap  Commonly known as: FLOMAX     testosterone cypionate 200 mg/mL injection  Commonly known as: DEPOTESTOTERONE CYPIONATE     topiramate 25 MG tablet  Commonly known as: TOPAMAX  Take 3  tablets (75 mg total) by mouth once daily.     XDEMVY 0.25 % Drop  Generic drug: lotilaner              SOCIAL HISTORY:   Social History[1]    FAMILY HISTORY:     Family History   Problem Relation Name Age of Onset    Heart disease Father      Clotting disorder Brother x 2     Diabetes Brother x 2     Hyperlipidemia Brother x 2     Cancer Brother x 2     Diabetes Sister x 4     Hyperlipidemia Sister x 4        REVIEW OF SYSTEMS:   Review of Systems   Constitutional:  Negative for chills and fever.   HENT:  Negative for hearing loss and tinnitus.    Eyes:  Negative for blurred vision and double vision.   Respiratory:  Negative for shortness of breath.    Cardiovascular:  Negative for chest pain and palpitations.   Gastrointestinal:  Negative for heartburn and nausea.   Genitourinary:  Negative for dysuria and urgency.   Musculoskeletal:  Negative for myalgias and neck pain.   Neurological:  Negative for dizziness and headaches.         PHYSICAL EXAM:     There were no vitals filed for this visit.   There is no height or weight on file to calculate BMI.            Gen: NAD  Head/Eyes/Ears/Nose: MMM, good dentition   Neck: No carotid bruits, no JVD  Lung: Clear to auscultation bilaterally, no wheezes/rales/ronchi, symmetrical lung expansion with inspiration  Heart: Normal S1/S2, regular rate and rhythm, no murmurs/rubs/gallops  Abdomen: Soft, NT/ND, no masses  Extremities: No lower extremity edema.  No wounds or other skin lesions  Skin: Normal color and turgor. No wounds rashes, no petechia, no ecchymoses.   Neuro: AAOx3    DATA:     Laboratory:  CBC:  Recent Labs   Lab 10/13/22  0937 12/05/23  0900 12/04/24  0915   WBC 5.37 7.21 8.38   Hemoglobin 16.0 15.8 16.5   Hematocrit 48.7 45.9 50.7   Platelets 236 235 238       CHEMISTRIES:  Recent Labs   Lab 10/13/22  0937 06/21/23  1102 12/05/23  0900 12/04/24  0915 06/26/25  0632   Glucose 96 89 101 88  --    Sodium 136 137 141 138 135   Potassium 4.6 4.9 4.1 4.5 4.3   BUN  23  --  26 H 19 19.6   Blood Urea Nitrogen  --  17  --   --   --    Creatinine 1.1 0.90 0.9 0.9 0.85   eGFR >60.0 92 >60.0 >60.0 92   Calcium 9.7 10.2 10.1 10.0 9.9       CARDIAC BIOMARKERS:        HBA1C:  Hemoglobin A1C   Date Value Ref Range Status   2024 5.5 4.0 - 5.6 % Final     Comment:     ADA Screening Guidelines:  5.7-6.4%  Consistent with prediabetes  >or=6.5%  Consistent with diabetes    High levels of fetal hemoglobin interfere with the HbA1C  assay. Heterozygous hemoglobin variants (HbS, HgC, etc)do  not significantly interfere with this assay.   However, presence of multiple variants may affect accuracy.     2023 5.3 4.0 - 5.6 % Final     Comment:     ADA Screening Guidelines:  5.7-6.4%  Consistent with prediabetes  >or=6.5%  Consistent with diabetes    High levels of fetal hemoglobin interfere with the HbA1C  assay. Heterozygous hemoglobin variants (HbS, HgC, etc)do  not significantly interfere with this assay.   However, presence of multiple variants may affect accuracy.     10/13/2022 5.3 4.0 - 5.6 % Final     Comment:     ADA Screening Guidelines:  5.7-6.4%  Consistent with prediabetes  >or=6.5%  Consistent with diabetes    High levels of fetal hemoglobin interfere with the HbA1C  assay. Heterozygous hemoglobin variants (HbS, HgC, etc)do  not significantly interfere with this assay.   However, presence of multiple variants may affect accuracy.          COAGS:        LIPIDS/LFTS:  Recent Labs   Lab 10/13/22  0937 23  0900 24  0915   Cholesterol 183 216 H 224 H   Triglycerides 61 92 115   HDL 33 L 34 L 38 L   LDL Cholesterol 137.8 163.6 H 163.0 H   Non-HDL Cholesterol 150 182 186   AST 41 H 40 42 H   ALT 41 59 H 57 H         CARDIAC DIAGNOSTICS:  :     [id number]  Sinus rhythm without any ST-T wave change    ECHO  2025    Left Ventricle: The left ventricle is normal in size. Mildly increased wall thickness. There is mild concentric hypertrophy. There is normal systolic  function with a visually estimated ejection fraction of 60 - 65%. Grade I diastolic dysfunction.    Right Ventricle: The right ventricle is normal in size measuring 3.6 cm. Systolic function is normal.    Aorta: The aortic root is mildly dilated measuring 3.9 cm.    Pulmonary Artery: The estimated pulmonary artery systolic pressure is 29 mmHg.    2/2020  Normal left ventricular systolic function. The estimated ejection fraction is 60%.  Mild concentric left ventricular hypertrophy.  Grade I (mild) left ventricular diastolic dysfunction consistent with impaired relaxation.  Normal right ventricular systolic function.  Moderate left atrial enlargement.  Mild tricuspid regurgitation.  Normal central venous pressure (3 mmHg).  The estimated PA systolic pressure is 35 mmHg.    3.  STRESS TEST  Lexiscan in July, 2025    Normal myocardial perfusion scan. There is no evidence of myocardial ischemia or infarction.    There is a mild intensity fixed perfusion abnormality in the inferior wall of the left ventricle secondary to diaphragm attenuation.    The gated perfusion images showed an ejection fraction of 52% post stress.    There is normal wall motion at post-stress.    Stress echo in April, 2021  The left ventricle is normal in size with concentric hypertrophy and normal systolic function.  The estimated ejection fraction is 60%.  Indeterminate left ventricular diastolic function.  Normal right ventricular size with normal right ventricular systolic function.  Mild left atrial enlargement.  Normal central venous pressure (3 mmHg).  The estimated PA systolic pressure is 25 mmHg.  There were no arrhythmias during stress.  The patient's exercise capacity was above average.  The ECG portion of this study is negative for myocardial ischemia.  The stress echo portion of this study is negative for myocardial ischemia at 93% of age predicted maximal heart rate    4.  CARDIAC CATHETERIZATION  NA    5.  IMAGING   CT abdomen pelvis  in  showed minimal abdominal aortic calcification        ASSESSMENT:     Hyperlipidemia -  in   Mildly dilated aortic root  Mildly dilated aortic root - 3.7 cm (2018) -> 3.9 cm ()  Obesity class 1  Migraine headache  History of hepatitis-C status post treatment almost 30 years ago    Doing very well from cardiac perspective.    PLAN:     Continue with baby aspirin and atorvastatin 80 mg daily  Aortic root size is stable.  Repeat echocardiogram in 2 years  Mediterranean diet and daily light exercise    Follow up in 6 months    Edward Resendez MD  [facility]      This note was created by combination of typed  and M-Modal dictation.   Transcription errors may be present.              [1]   Social History  Socioeconomic History    Marital status:      Spouse name: Deloris    Number of children: 1   Occupational History    Occupation: works at a chemical plant    Occupation: Retired   Tobacco Use    Smoking status: Former     Current packs/day: 0.00     Types: Cigarettes     Quit date: 2006     Years since quittin.9     Passive exposure: Past    Smokeless tobacco: Never   Substance and Sexual Activity    Alcohol use: No    Drug use: No    Sexual activity: Yes     Partners: Female   Social History Narrative    He is walking regularly.     Social Drivers of Health     Financial Resource Strain: Low Risk  (2025)    Overall Financial Resource Strain (CARDIA)     Difficulty of Paying Living Expenses: Not hard at all   Food Insecurity: No Food Insecurity (2025)    Hunger Vital Sign     Worried About Running Out of Food in the Last Year: Never true     Ran Out of Food in the Last Year: Never true   Transportation Needs: No Transportation Needs (2025)    PRAPARE - Transportation     Lack of Transportation (Medical): No     Lack of Transportation (Non-Medical): No   Physical Activity: Sufficiently Active (2025)     Exercise Vital Sign     Days of Exercise per Week: 3 days     Minutes of Exercise per Session: 60 min   Stress: No Stress Concern Present (5/30/2025)    Received from [facility] - Occupational Stress Questionnaire     Feeling of Stress : Not at all   Housing Stability: Unknown (7/14/2025)    Housing Stability Vital Sign     Unable to Pay for Housing in the Last Year: Patient declined     Number of Times Moved in the Last Year: 1     Homeless in the Last Year: No

## 2025-08-11 ENCOUNTER — HOSPITAL ENCOUNTER (EMERGENCY)
Facility: HOSPITAL | Age: 75
Discharge: HOME | End: 2025-08-11
Attending: EMERGENCY MEDICINE
Payer: MEDICARE

## 2025-08-11 VITALS
DIASTOLIC BLOOD PRESSURE: 90 MMHG | TEMPERATURE: 98 F | WEIGHT: 210 LBS | SYSTOLIC BLOOD PRESSURE: 150 MMHG | HEART RATE: 96 BPM | RESPIRATION RATE: 18 BRPM | BODY MASS INDEX: 32.89 KG/M2 | OXYGEN SATURATION: 96 %

## 2025-08-11 DIAGNOSIS — S61.012A LACERATION OF LEFT THUMB WITHOUT FOREIGN BODY WITHOUT DAMAGE TO NAIL, INITIAL ENCOUNTER: Primary | ICD-10-CM

## 2025-08-11 DIAGNOSIS — S61.217A LACERATION OF LEFT LITTLE FINGER WITHOUT FOREIGN BODY WITHOUT DAMAGE TO NAIL, INITIAL ENCOUNTER: ICD-10-CM

## 2025-08-11 PROCEDURE — 63600175 PHARM REV CODE 636 W HCPCS: Mod: HCNC,ER | Performed by: NURSE PRACTITIONER

## 2025-08-11 PROCEDURE — 99283 EMERGENCY DEPT VISIT LOW MDM: CPT | Mod: 25,HCNC,ER

## 2025-08-11 PROCEDURE — 12002 RPR S/N/AX/GEN/TRNK2.6-7.5CM: CPT | Mod: HCNC,ER

## 2025-08-11 RX ORDER — CEPHALEXIN 500 MG/1
500 CAPSULE ORAL 4 TIMES DAILY
Qty: 28 CAPSULE | Refills: 0 | Status: SHIPPED | OUTPATIENT
Start: 2025-08-11 | End: 2025-08-18

## 2025-08-11 RX ORDER — LIDOCAINE HYDROCHLORIDE 10 MG/ML
10 INJECTION, SOLUTION INFILTRATION; PERINEURAL
Status: COMPLETED | OUTPATIENT
Start: 2025-08-11 | End: 2025-08-11

## 2025-08-11 RX ADMIN — LIDOCAINE HYDROCHLORIDE 10 ML: 10 INJECTION, SOLUTION INFILTRATION; PERINEURAL at 08:08

## 2025-08-12 ENCOUNTER — PATIENT OUTREACH (OUTPATIENT)
Facility: OTHER | Age: 75
End: 2025-08-12
Payer: MEDICARE